# Patient Record
Sex: FEMALE | Race: WHITE | Employment: OTHER | URBAN - NONMETROPOLITAN AREA
[De-identification: names, ages, dates, MRNs, and addresses within clinical notes are randomized per-mention and may not be internally consistent; named-entity substitution may affect disease eponyms.]

---

## 2017-01-05 RX ORDER — MONTELUKAST SODIUM 10 MG/1
TABLET ORAL
Qty: 270 TABLET | Refills: 0 | Status: SHIPPED | OUTPATIENT
Start: 2017-01-05 | End: 2017-09-29

## 2017-01-06 RX ORDER — METOPROLOL SUCCINATE 25 MG/1
TABLET, EXTENDED RELEASE ORAL
Qty: 45 TABLET | Refills: 0 | Status: SHIPPED | OUTPATIENT
Start: 2017-01-06 | End: 2017-04-04

## 2017-01-28 ENCOUNTER — TELEPHONE (OUTPATIENT)
Dept: FAMILY MEDICINE CLINIC | Facility: CLINIC | Age: 62
End: 2017-01-28

## 2017-01-28 RX ORDER — AZITHROMYCIN 250 MG/1
TABLET, FILM COATED ORAL
Qty: 1 PACKAGE | Refills: 0 | OUTPATIENT
Start: 2017-01-28 | End: 2017-02-21 | Stop reason: ALTCHOICE

## 2017-01-28 RX ORDER — ALBUTEROL SULFATE 90 UG/1
2 AEROSOL, METERED RESPIRATORY (INHALATION) EVERY 4 HOURS PRN
Qty: 1 INHALER | Refills: 6 | OUTPATIENT
Start: 2017-01-28 | End: 2017-08-10 | Stop reason: ALTCHOICE

## 2017-01-28 NOTE — TELEPHONE ENCOUNTER
I spoke to the pt. She states on Wed she started with some head pressure and congestion. Yesterday it got worse. She states she has pain in her cheek bones and into her teeth. Nasal congestion which is yellow in color. Pt denies fever.  She is using Advil e

## 2017-02-02 RX ORDER — LISINOPRIL 20 MG/1
TABLET ORAL
Qty: 90 TABLET | Refills: 3 | Status: SHIPPED | OUTPATIENT
Start: 2017-02-02 | End: 2018-01-28

## 2017-02-18 ENCOUNTER — TELEPHONE (OUTPATIENT)
Dept: FAMILY MEDICINE CLINIC | Facility: CLINIC | Age: 62
End: 2017-02-18

## 2017-02-18 RX ORDER — OSELTAMIVIR PHOSPHATE 75 MG/1
CAPSULE ORAL
Qty: 10 CAPSULE | Refills: 0 | Status: SHIPPED | OUTPATIENT
Start: 2017-02-18 | End: 2017-08-10

## 2017-02-18 NOTE — TELEPHONE ENCOUNTER
Patient said that she is nervous that she may come down with flu symptoms, she slept in the same bed as her grandson and he has influenza symptoms. She would like Tamiflu sent to the pharmacy to take prophylactically. Advised to take daily and if symptoms o

## 2017-02-21 ENCOUNTER — LAB ENCOUNTER (OUTPATIENT)
Dept: LAB | Age: 62
End: 2017-02-21
Attending: INTERNAL MEDICINE
Payer: COMMERCIAL

## 2017-02-21 ENCOUNTER — OFFICE VISIT (OUTPATIENT)
Dept: FAMILY MEDICINE CLINIC | Facility: CLINIC | Age: 62
End: 2017-02-21

## 2017-02-21 VITALS
HEART RATE: 92 BPM | SYSTOLIC BLOOD PRESSURE: 160 MMHG | DIASTOLIC BLOOD PRESSURE: 96 MMHG | RESPIRATION RATE: 16 BRPM | TEMPERATURE: 98 F

## 2017-02-21 DIAGNOSIS — I10 ESSENTIAL HYPERTENSION, BENIGN: ICD-10-CM

## 2017-02-21 DIAGNOSIS — E78.00 PURE HYPERCHOLESTEROLEMIA: Primary | ICD-10-CM

## 2017-02-21 DIAGNOSIS — R73.02 GLUCOSE INTOLERANCE (IMPAIRED GLUCOSE TOLERANCE): ICD-10-CM

## 2017-02-21 DIAGNOSIS — E78.00 PURE HYPERCHOLESTEROLEMIA: ICD-10-CM

## 2017-02-21 LAB
ALBUMIN SERPL-MCNC: 3.8 G/DL (ref 3.5–4.8)
ALP LIVER SERPL-CCNC: 98 U/L (ref 46–118)
ALT SERPL-CCNC: 18 U/L (ref 14–54)
AST SERPL-CCNC: 10 U/L (ref 15–41)
BASOPHILS # BLD AUTO: 0.11 X10(3) UL (ref 0–0.1)
BASOPHILS NFR BLD AUTO: 1.5 %
BILIRUB SERPL-MCNC: 0.6 MG/DL (ref 0.1–2)
BUN BLD-MCNC: 17 MG/DL (ref 8–20)
CALCIUM BLD-MCNC: 9.2 MG/DL (ref 8.3–10.3)
CHLORIDE: 102 MMOL/L (ref 101–111)
CHOLEST SMN-MCNC: 221 MG/DL (ref ?–200)
CO2: 30 MMOL/L (ref 22–32)
CREAT BLD-MCNC: 0.82 MG/DL (ref 0.55–1.02)
EOSINOPHIL # BLD AUTO: 0.17 X10(3) UL (ref 0–0.3)
EOSINOPHIL NFR BLD AUTO: 2.2 %
ERYTHROCYTE [DISTWIDTH] IN BLOOD BY AUTOMATED COUNT: 13.7 % (ref 11.5–16)
EST. AVERAGE GLUCOSE BLD GHB EST-MCNC: 120 MG/DL (ref 68–126)
GLUCOSE BLD-MCNC: 111 MG/DL (ref 70–99)
HBA1C MFR BLD HPLC: 5.8 % (ref ?–5.7)
HCT VFR BLD AUTO: 41.6 % (ref 34–50)
HDLC SERPL-MCNC: 46 MG/DL (ref 45–?)
HDLC SERPL: 4.8 {RATIO} (ref ?–4.44)
HGB BLD-MCNC: 13.4 G/DL (ref 12–16)
IMMATURE GRANULOCYTE COUNT: 0.04 X10(3) UL (ref 0–1)
IMMATURE GRANULOCYTE RATIO %: 0.5 %
LDLC SERPL CALC-MCNC: 107 MG/DL (ref ?–130)
LYMPHOCYTES # BLD AUTO: 1.83 X10(3) UL (ref 0.9–4)
LYMPHOCYTES NFR BLD AUTO: 24.2 %
M PROTEIN MFR SERPL ELPH: 7.5 G/DL (ref 6.1–8.3)
MCH RBC QN AUTO: 29.5 PG (ref 27–33.2)
MCHC RBC AUTO-ENTMCNC: 32.2 G/DL (ref 31–37)
MCV RBC AUTO: 91.4 FL (ref 81–100)
MONOCYTES # BLD AUTO: 0.48 X10(3) UL (ref 0.1–0.6)
MONOCYTES NFR BLD AUTO: 6.3 %
NEUTROPHIL ABS PRELIM: 4.94 X10 (3) UL (ref 1.3–6.7)
NEUTROPHILS # BLD AUTO: 4.94 X10(3) UL (ref 1.3–6.7)
NEUTROPHILS NFR BLD AUTO: 65.3 %
NONHDLC SERPL-MCNC: 175 MG/DL (ref ?–130)
PLATELET # BLD AUTO: 148 10(3)UL (ref 150–450)
POTASSIUM SERPL-SCNC: 4.1 MMOL/L (ref 3.6–5.1)
RBC # BLD AUTO: 4.55 X10(6)UL (ref 3.8–5.1)
RED CELL DISTRIBUTION WIDTH-SD: 46.1 FL (ref 35.1–46.3)
SODIUM SERPL-SCNC: 139 MMOL/L (ref 136–144)
TRIGLYCERIDES: 340 MG/DL (ref ?–150)
VLDL: 68 MG/DL (ref 5–40)
WBC # BLD AUTO: 7.6 X10(3) UL (ref 4–13)

## 2017-02-21 PROCEDURE — 36415 COLL VENOUS BLD VENIPUNCTURE: CPT

## 2017-02-21 PROCEDURE — 85025 COMPLETE CBC W/AUTO DIFF WBC: CPT

## 2017-02-21 PROCEDURE — 80061 LIPID PANEL: CPT

## 2017-02-21 PROCEDURE — 99214 OFFICE O/P EST MOD 30 MIN: CPT | Performed by: INTERNAL MEDICINE

## 2017-02-21 PROCEDURE — 80053 COMPREHEN METABOLIC PANEL: CPT

## 2017-02-21 PROCEDURE — 83036 HEMOGLOBIN GLYCOSYLATED A1C: CPT

## 2017-02-21 NOTE — PROGRESS NOTES
Anisa Li is a 63year old female. HPI:   Patient presents for recheck of her hypertension. Pt has been taking medications as instructed, no medication side effects, home BP monitoring in the range of 579-632'F systolic and 95-32'G diastolic. Anxiety. For grand mal seizures Disp:  Rfl:    alprazolam 0.25 MG Oral Tab Take 1 tablet (0.25 mg total) by mouth 3 (three) times daily as needed for Sleep.  Disp: 60 tablet Rfl: 0   Oseltamivir Phosphate 75 MG Oral Cap Take 1 capsule by mouth daily Disp: 1 POLYPECTOMY/SUBMUCOSAL INJECTION/ENDOSCOPIC MUCOSAL RESECTION/BALLOON DILATION/BAND LIGATION;  Surgeon: Gilma Vee DO;  Location: [address] [place] [address] ENDOSCOPY    COLONOSCOPY WITH BIOPSY  7/1/2016    Comment Procedure: COLONOSCOPY WITH BIOPSY;  Surgeon: Prabhjot Dalton

## 2017-02-23 ENCOUNTER — TELEPHONE (OUTPATIENT)
Dept: FAMILY MEDICINE CLINIC | Facility: CLINIC | Age: 62
End: 2017-02-23

## 2017-02-23 DIAGNOSIS — E78.5 ELEVATED LIPIDS: Primary | ICD-10-CM

## 2017-02-23 NOTE — TELEPHONE ENCOUNTER
Patient advised of lab results. She asked if she can start walking on the treadmill and when she needs to recheck lipid panel.

## 2017-02-27 ENCOUNTER — TELEPHONE (OUTPATIENT)
Dept: FAMILY MEDICINE CLINIC | Facility: CLINIC | Age: 62
End: 2017-02-27

## 2017-02-27 RX ORDER — ALPRAZOLAM 0.25 MG/1
0.25 TABLET ORAL 3 TIMES DAILY PRN
Qty: 60 TABLET | Refills: 0 | Status: SHIPPED | OUTPATIENT
Start: 2017-02-27 | End: 2017-12-19

## 2017-02-27 NOTE — TELEPHONE ENCOUNTER
Patient advised, she said this weekend the systolic was in the 41'W. She said she takes both pills in the morning, should she continue this. Can you refill the Xanax?

## 2017-02-27 NOTE — TELEPHONE ENCOUNTER
Patient said blood pressure was 160/110 this am. She is on Lisinopril 20mg and Metoprolol 12.5 mg this blood pressure was before she took her medication. She ate popcorn and took a Xanax last night. She is under stress right now, her parents are both sick.

## 2017-03-03 RX ORDER — METOPROLOL SUCCINATE 25 MG/1
TABLET, EXTENDED RELEASE ORAL
Qty: 45 TABLET | Refills: 0 | Status: SHIPPED | OUTPATIENT
Start: 2017-03-03 | End: 2017-08-10 | Stop reason: ALTCHOICE

## 2017-04-04 RX ORDER — METOPROLOL SUCCINATE 25 MG/1
TABLET, EXTENDED RELEASE ORAL
Qty: 45 TABLET | Refills: 2 | Status: SHIPPED | OUTPATIENT
Start: 2017-04-04 | End: 2017-08-14

## 2017-06-22 ENCOUNTER — PATIENT OUTREACH (OUTPATIENT)
Dept: FAMILY MEDICINE CLINIC | Facility: CLINIC | Age: 62
End: 2017-06-22

## 2017-07-21 ENCOUNTER — TELEPHONE (OUTPATIENT)
Dept: FAMILY MEDICINE CLINIC | Facility: CLINIC | Age: 62
End: 2017-07-21

## 2017-07-21 DIAGNOSIS — Z00.00 ROUTINE HEALTH MAINTENANCE: Primary | ICD-10-CM

## 2017-08-10 ENCOUNTER — LABORATORY ENCOUNTER (OUTPATIENT)
Dept: LAB | Age: 62
End: 2017-08-10
Attending: INTERNAL MEDICINE
Payer: COMMERCIAL

## 2017-08-10 ENCOUNTER — OFFICE VISIT (OUTPATIENT)
Dept: FAMILY MEDICINE CLINIC | Facility: CLINIC | Age: 62
End: 2017-08-10

## 2017-08-10 VITALS — OXYGEN SATURATION: 99 % | TEMPERATURE: 98 F | HEART RATE: 76 BPM

## 2017-08-10 DIAGNOSIS — R76.8 POSITIVE ANA (ANTINUCLEAR ANTIBODY): ICD-10-CM

## 2017-08-10 DIAGNOSIS — I10 ESSENTIAL HYPERTENSION, BENIGN: ICD-10-CM

## 2017-08-10 DIAGNOSIS — D69.1 PLATELET DYSFUNCTION (HCC): ICD-10-CM

## 2017-08-10 DIAGNOSIS — G40.319 SEIZURE DISORDER, GENERALIZED NONCONVULSIVE, INTRACTABLE (HCC): ICD-10-CM

## 2017-08-10 DIAGNOSIS — Z00.00 WELL ADULT EXAM: Primary | ICD-10-CM

## 2017-08-10 DIAGNOSIS — E78.5 ELEVATED LIPIDS: ICD-10-CM

## 2017-08-10 DIAGNOSIS — R73.9 HYPERGLYCEMIA: ICD-10-CM

## 2017-08-10 DIAGNOSIS — R90.82 WHITE MATTER ABNORMALITY ON MRI OF BRAIN: ICD-10-CM

## 2017-08-10 DIAGNOSIS — E66.9 OBESITY, UNSPECIFIED: ICD-10-CM

## 2017-08-10 DIAGNOSIS — D69.1 PLATELET DYSFUNCTION (HCC): Chronic | ICD-10-CM

## 2017-08-10 DIAGNOSIS — D69.6 THROMBOCYTOPENIA (HCC): ICD-10-CM

## 2017-08-10 LAB
ALBUMIN SERPL-MCNC: 3.8 G/DL (ref 3.5–4.8)
ALP LIVER SERPL-CCNC: 89 U/L (ref 46–118)
ALT SERPL-CCNC: 22 U/L (ref 14–54)
AST SERPL-CCNC: 11 U/L (ref 15–41)
BASOPHILS # BLD AUTO: 0.1 X10(3) UL (ref 0–0.1)
BASOPHILS NFR BLD AUTO: 1.4 %
BILIRUB SERPL-MCNC: 0.7 MG/DL (ref 0.1–2)
BUN BLD-MCNC: 14 MG/DL (ref 8–20)
C-REACTIVE PROTEIN: 0.42 MG/DL (ref ?–1)
CALCIUM BLD-MCNC: 8.9 MG/DL (ref 8.3–10.3)
CHLORIDE: 106 MMOL/L (ref 101–111)
CHOLEST SMN-MCNC: 212 MG/DL (ref ?–200)
CO2: 25 MMOL/L (ref 22–32)
CREAT BLD-MCNC: 0.87 MG/DL (ref 0.55–1.02)
EOSINOPHIL # BLD AUTO: 0.13 X10(3) UL (ref 0–0.3)
EOSINOPHIL NFR BLD AUTO: 1.8 %
ERYTHROCYTE [DISTWIDTH] IN BLOOD BY AUTOMATED COUNT: 13.2 % (ref 11.5–16)
EST. AVERAGE GLUCOSE BLD GHB EST-MCNC: 128 MG/DL (ref 68–126)
FREE T4: 0.9 NG/DL (ref 0.9–1.8)
GLUCOSE BLD-MCNC: 122 MG/DL (ref 70–99)
HBA1C MFR BLD HPLC: 6.1 % (ref ?–5.7)
HCT VFR BLD AUTO: 40 % (ref 34–50)
HDLC SERPL-MCNC: 39 MG/DL (ref 45–?)
HDLC SERPL: 5.44 {RATIO} (ref ?–4.44)
HGB BLD-MCNC: 13.4 G/DL (ref 12–16)
IMMATURE GRANULOCYTE COUNT: 0.04 X10(3) UL (ref 0–1)
IMMATURE GRANULOCYTE RATIO %: 0.6 %
LDLC SERPL CALC-MCNC: 114 MG/DL (ref ?–130)
LDLC SERPL-MCNC: 59 MG/DL (ref 5–40)
LYMPHOCYTES # BLD AUTO: 1.24 X10(3) UL (ref 0.9–4)
LYMPHOCYTES NFR BLD AUTO: 17.5 %
M PROTEIN MFR SERPL ELPH: 7.4 G/DL (ref 6.1–8.3)
MCH RBC QN AUTO: 29.3 PG (ref 27–33.2)
MCHC RBC AUTO-ENTMCNC: 33.5 G/DL (ref 31–37)
MCV RBC AUTO: 87.3 FL (ref 81–100)
MONOCYTES # BLD AUTO: 0.43 X10(3) UL (ref 0.1–0.6)
MONOCYTES NFR BLD AUTO: 6.1 %
NEUTROPHIL ABS PRELIM: 5.13 X10 (3) UL (ref 1.3–6.7)
NEUTROPHILS # BLD AUTO: 5.13 X10(3) UL (ref 1.3–6.7)
NEUTROPHILS NFR BLD AUTO: 72.6 %
NONHDLC SERPL-MCNC: 173 MG/DL (ref ?–130)
PLATELET # BLD AUTO: 125 10(3)UL (ref 150–450)
POTASSIUM SERPL-SCNC: 3.9 MMOL/L (ref 3.6–5.1)
RBC # BLD AUTO: 4.58 X10(6)UL (ref 3.8–5.1)
RED CELL DISTRIBUTION WIDTH-SD: 42.3 FL (ref 35.1–46.3)
SED RATE-ML: 11 MM/HR (ref 0–25)
SODIUM SERPL-SCNC: 138 MMOL/L (ref 136–144)
TRIGLYCERIDES: 294 MG/DL (ref ?–150)
TSI SER-ACNC: 5.38 MIU/ML (ref 0.35–5.5)
WBC # BLD AUTO: 7.1 X10(3) UL (ref 4–13)

## 2017-08-10 PROCEDURE — 80061 LIPID PANEL: CPT | Performed by: INTERNAL MEDICINE

## 2017-08-10 PROCEDURE — 99396 PREV VISIT EST AGE 40-64: CPT | Performed by: INTERNAL MEDICINE

## 2017-08-10 PROCEDURE — 84439 ASSAY OF FREE THYROXINE: CPT | Performed by: INTERNAL MEDICINE

## 2017-08-10 PROCEDURE — 36415 COLL VENOUS BLD VENIPUNCTURE: CPT | Performed by: INTERNAL MEDICINE

## 2017-08-10 PROCEDURE — 86038 ANTINUCLEAR ANTIBODIES: CPT | Performed by: INTERNAL MEDICINE

## 2017-08-10 PROCEDURE — 86235 NUCLEAR ANTIGEN ANTIBODY: CPT | Performed by: INTERNAL MEDICINE

## 2017-08-10 PROCEDURE — 85652 RBC SED RATE AUTOMATED: CPT | Performed by: INTERNAL MEDICINE

## 2017-08-10 PROCEDURE — 83036 HEMOGLOBIN GLYCOSYLATED A1C: CPT | Performed by: INTERNAL MEDICINE

## 2017-08-10 PROCEDURE — 80050 GENERAL HEALTH PANEL: CPT | Performed by: INTERNAL MEDICINE

## 2017-08-10 PROCEDURE — 86140 C-REACTIVE PROTEIN: CPT | Performed by: INTERNAL MEDICINE

## 2017-08-10 NOTE — PROGRESS NOTES
HPI:   Yumiko Guzmán is a 63year old female who presents for a complete physical exam. Symptoms: is menopausal. Patient complains of weight gain. She has been doing well.   She has follow up with neuro and rheum due to issues with encephalopathy and s TABLET BY MOUTH EVERY DAY Disp: 270 tablet Rfl: 0   levetiracetam 750 MG Oral Tab Take 1 tablet (750 mg total) by mouth 2 (two) times daily. Disp:  Rfl: 0   Probiotic Product (PROBIOTIC DAILY) Oral Cap Take by mouth.  Florstar 1 capsule BID Disp:  Rfl:    C ENDOSCOPY   Family History   Problem Relation Age of Onset   • Heart Disorder Father    • Other [OTHER] Father      PACEMAKER TACHYBRADY SYNDROME   • Arthritis Mother    • Heart Disorder Mother    • Other Cristofer Lam Mother      Hypothyroidism   • Cancer Mater RECTAL:good rectal tone, stool is OB negative  MUSCULOSKELETAL: back is not tender,FROM of the back  EXTREMITIES: no cyanosis, clubbing or edema  NEURO: Oriented times three,cranial nerves are intact,motor and sensory are grossly intact    ASSESSMENT AND

## 2017-08-11 LAB — ANA SCREEN: POSITIVE

## 2017-08-13 LAB
RIBONUCLEIC PROTEIN (U1) (ENA) AB, IGG: 0 AU/ML
SMITH (ENA) ANTIBODY, IGG: 0 AU/ML
SSA 52 (RO) (ENA) ANTIBODY, IGG: 1 AU/ML
SSA 60 (RO) (ENA) ANTIBODY, IGG: 0 AU/ML
SSB (LA) (ENA) ANTIBODY, IGG: 0 AU/ML

## 2017-08-14 ENCOUNTER — TELEPHONE (OUTPATIENT)
Dept: FAMILY MEDICINE CLINIC | Facility: CLINIC | Age: 62
End: 2017-08-14

## 2017-08-14 RX ORDER — METOPROLOL SUCCINATE 25 MG/1
25 TABLET, EXTENDED RELEASE ORAL DAILY
Qty: 90 TABLET | Refills: 2 | COMMUNITY
Start: 2017-08-14 | End: 2017-09-20

## 2017-08-14 NOTE — TELEPHONE ENCOUNTER
Calling the patient to advise- correcting the med list as well       Future Appointments  Date Time Provider Richard Leary   8/29/2017 2:00 PM [facility] NURSE HALIMA [facility]

## 2017-08-14 NOTE — TELEPHONE ENCOUNTER
BP at home are better, increase metoprolol to 25 mg and continue lisinopril at 20 mg recheck here with home cuff in 2 weeks.

## 2017-08-14 NOTE — TELEPHONE ENCOUNTER
I had called the patient about her labs and she was questioning her BP readings from when she was in last 8/10/17    She advised that slade took 2x- advised she was not going to record it and have Dr Aga Armstrong recheck it; Dr Aga Armstrong rechecked it and then also

## 2017-08-15 ENCOUNTER — TELEPHONE (OUTPATIENT)
Dept: FAMILY MEDICINE CLINIC | Facility: CLINIC | Age: 62
End: 2017-08-15

## 2017-08-15 RX ORDER — METOPROLOL SUCCINATE 25 MG/1
25 TABLET, EXTENDED RELEASE ORAL DAILY
Qty: 90 TABLET | Refills: 3 | Status: SHIPPED | OUTPATIENT
Start: 2017-08-15 | End: 2018-04-24

## 2017-08-15 RX ORDER — SACCHAROMYCES BOULARDII 250 MG
250 CAPSULE ORAL 2 TIMES DAILY
Qty: 60 CAPSULE | Refills: 11 | Status: SHIPPED | OUTPATIENT
Start: 2017-08-15

## 2017-08-15 RX ORDER — METOPROLOL SUCCINATE 25 MG/1
25 TABLET, EXTENDED RELEASE ORAL DAILY
Qty: 90 TABLET | Refills: 2 | Status: CANCELLED | OUTPATIENT
Start: 2017-08-15

## 2017-08-15 NOTE — TELEPHONE ENCOUNTER
Call Sarita about Florastor, probiotic Quangjo ann Salinas wants to know if this can be run thru her ins because its $50.00 for 50 pills. She needs Metoprolol to be filled for 25mg for 30 days needs new script to [facility], call Jaylon Alas.

## 2017-08-29 ENCOUNTER — NURSE ONLY (OUTPATIENT)
Dept: FAMILY MEDICINE CLINIC | Facility: CLINIC | Age: 62
End: 2017-08-29

## 2017-08-29 VITALS — SYSTOLIC BLOOD PRESSURE: 132 MMHG | DIASTOLIC BLOOD PRESSURE: 80 MMHG

## 2017-09-12 ENCOUNTER — TELEPHONE (OUTPATIENT)
Dept: FAMILY MEDICINE CLINIC | Facility: CLINIC | Age: 62
End: 2017-09-12

## 2017-09-12 NOTE — TELEPHONE ENCOUNTER
Patient said that she thinks she has a sinus infection, she has laryngitis, congestion, sinus pressure and she feels \"terrible\". She is on Singulair, Tylenol, and she took a Zyrtec last night.  She is afraid it is going to go in her chest. Does she need t

## 2017-09-16 ENCOUNTER — TELEPHONE (OUTPATIENT)
Dept: FAMILY MEDICINE CLINIC | Facility: CLINIC | Age: 62
End: 2017-09-16

## 2017-09-16 RX ORDER — CIPROFLOXACIN 500 MG/1
500 TABLET, FILM COATED ORAL 2 TIMES DAILY
Qty: 20 TABLET | Refills: 0 | Status: SHIPPED | OUTPATIENT
Start: 2017-09-16 | End: 2017-09-20 | Stop reason: ALTCHOICE

## 2017-09-16 NOTE — TELEPHONE ENCOUNTER
He called the other day and was asking for antibiotics to treat a sinus infection. Patient said she is feeling slightly better but she has a bad cough and she found an old Albuterol inhaler she has been using every 4 hours.  She said that sides of her back

## 2017-09-20 ENCOUNTER — OFFICE VISIT (OUTPATIENT)
Dept: FAMILY MEDICINE CLINIC | Facility: CLINIC | Age: 62
End: 2017-09-20

## 2017-09-20 ENCOUNTER — TELEPHONE (OUTPATIENT)
Dept: FAMILY MEDICINE CLINIC | Facility: CLINIC | Age: 62
End: 2017-09-20

## 2017-09-20 VITALS
TEMPERATURE: 98 F | HEART RATE: 90 BPM | RESPIRATION RATE: 18 BRPM | SYSTOLIC BLOOD PRESSURE: 130 MMHG | DIASTOLIC BLOOD PRESSURE: 70 MMHG

## 2017-09-20 DIAGNOSIS — I10 ESSENTIAL HYPERTENSION, BENIGN: ICD-10-CM

## 2017-09-20 DIAGNOSIS — R05.9 COUGH: Primary | ICD-10-CM

## 2017-09-20 PROCEDURE — 99214 OFFICE O/P EST MOD 30 MIN: CPT | Performed by: INTERNAL MEDICINE

## 2017-09-20 RX ORDER — ALBUTEROL SULFATE 90 UG/1
2 AEROSOL, METERED RESPIRATORY (INHALATION) EVERY 6 HOURS PRN
Qty: 1 INHALER | Refills: 2 | Status: SHIPPED | OUTPATIENT
Start: 2017-09-20 | End: 2017-10-12

## 2017-09-20 RX ORDER — PREDNISONE 20 MG/1
20 TABLET ORAL DAILY
Qty: 5 TABLET | Refills: 0 | Status: SHIPPED | OUTPATIENT
Start: 2017-09-20 | End: 2018-04-24 | Stop reason: ALTCHOICE

## 2017-09-20 RX ORDER — ALBUTEROL SULFATE 90 UG/1
AEROSOL, METERED RESPIRATORY (INHALATION) EVERY 6 HOURS PRN
COMMUNITY
End: 2017-09-20

## 2017-09-20 NOTE — PROGRESS NOTES
HPI:   Yumiko Guzmán is a 63year old female who presents for upper respiratory symptoms for  5  days. Patient reports congestion, dry cough, cough is keeping pt up at night, wheezing.       Current Outpatient Prescriptions:  Albuterol Sulfate HFA (PROA 12/27/2011   • Postmenopausal atrophic vaginitis 6/27/2007   • Pure hypercholesterolemia 10/10/2007   • Seizure disorder [facility])     last episode Sept 14, 2015   • Unspecified asthma(493.90) 3/23/2007   • Unspecified essential hypertension    • Unspecified h (36.9 °C) (Temporal)   Resp 18   LMP  (LMP Unknown)   GENERAL: well developed, well nourished,in no apparent distress  SKIN: no rashes,no suspicious lesions  EYES:PERRLA, EOMI, normal optic disk,conjunctiva are clear  HEENT: atraumatic, normocephalic,ears

## 2017-09-20 NOTE — TELEPHONE ENCOUNTER
WHEEZING WORSE THIS WEEK. I DID MAKE AN APPOINTMENT FOR TOMORROW BUT SHE WAS WONDERING IF SHE COULD GET SQUEEZED IN TODAY? PLEASE LET HER KNOW.

## 2017-09-29 RX ORDER — MONTELUKAST SODIUM 10 MG/1
TABLET ORAL
Qty: 270 TABLET | Refills: 0 | Status: SHIPPED | OUTPATIENT
Start: 2017-09-29 | End: 2018-05-10

## 2017-10-06 ENCOUNTER — TELEPHONE (OUTPATIENT)
Dept: FAMILY MEDICINE CLINIC | Facility: CLINIC | Age: 62
End: 2017-10-06

## 2017-10-06 NOTE — TELEPHONE ENCOUNTER
Asked to speak with Dr Candie Ortega. Is asking if Dr Candie Ortega would call in a steroid inhaler. Says she hasn't been feeling well since the fair and said that with added anxiety she feels she need something more.

## 2017-10-12 ENCOUNTER — MED REC SCAN ONLY (OUTPATIENT)
Dept: FAMILY MEDICINE CLINIC | Facility: CLINIC | Age: 62
End: 2017-10-12

## 2017-10-12 RX ORDER — ALBUTEROL SULFATE 90 UG/1
2 AEROSOL, METERED RESPIRATORY (INHALATION) EVERY 6 HOURS PRN
Qty: 1 INHALER | Refills: 2 | Status: SHIPPED | OUTPATIENT
Start: 2017-10-12 | End: 2020-04-03

## 2017-10-12 NOTE — TELEPHONE ENCOUNTER
ACT done. Patient wanted Dr Toi Larsen to know she has an MRI next Monday. She said the Sigulair and Zyrtec help but Dr Kathya Amin prescribes a steroid inhaler for her last week. She wants refill on Proair. Proair refilled, Dr Toi Larsen advised of the results.

## 2017-11-11 ENCOUNTER — TELEPHONE (OUTPATIENT)
Dept: FAMILY MEDICINE CLINIC | Facility: CLINIC | Age: 62
End: 2017-11-11

## 2017-11-11 NOTE — TELEPHONE ENCOUNTER
FEELS LIKE SHE HAS A COLD SORE COMING ON IN LT CORNER SIDE OF MOUTH, CAN SOMETHING BE SENT TO [facility] SO IT DOESN'T GET FULL BLOWN?

## 2017-11-11 NOTE — TELEPHONE ENCOUNTER
Patient said it has been a long time since she has had a cold sore, it has just started. She said that she has not tried anything, she did not know what to.  FYI patient said that Dr Lizett Brown wants her tested for the Storage Pool Disease since her daughter is p

## 2017-11-22 ENCOUNTER — TELEPHONE (OUTPATIENT)
Dept: FAMILY MEDICINE CLINIC | Facility: CLINIC | Age: 62
End: 2017-11-22

## 2017-11-22 DIAGNOSIS — E78.00 PURE HYPERCHOLESTEROLEMIA: ICD-10-CM

## 2017-11-22 DIAGNOSIS — E78.00 HYPERCHOLESTEREMIA: Primary | ICD-10-CM

## 2017-11-22 NOTE — TELEPHONE ENCOUNTER
Patient said that she had blood work done Monday at [facility] ordered by Dr Hellen Hansen to be checked for the Storage Pool Disease (PFA with EPIADP) she also had a CBC and CMP. She said it was abnormal and so they are going to do a Platelet Electron Microscopy.  She is

## 2017-11-30 ENCOUNTER — MED REC SCAN ONLY (OUTPATIENT)
Dept: FAMILY MEDICINE CLINIC | Facility: CLINIC | Age: 62
End: 2017-11-30

## 2017-12-07 ENCOUNTER — TELEPHONE (OUTPATIENT)
Dept: FAMILY MEDICINE CLINIC | Facility: CLINIC | Age: 62
End: 2017-12-07

## 2017-12-07 NOTE — TELEPHONE ENCOUNTER
DOES HAVE STORAGE POOL DEFICIENCY, WILL SEE SPECIALIST NEXT Friday 12/15/17. AND WILL HAVE TO CARRY A NASAL SPRAY FOR BLEEDING. SHE WILL HAVE ALL TESTING SENT HERE.

## 2017-12-07 NOTE — TELEPHONE ENCOUNTER
FYKENNETH, Patient said that she is seeing Dr Jenelle Beach on the 15th and she will have a platelet transfusion and will have to carry DDAVP around with her.

## 2017-12-19 RX ORDER — ALPRAZOLAM 0.25 MG/1
0.25 TABLET ORAL 3 TIMES DAILY PRN
Qty: 60 TABLET | Refills: 0 | Status: SHIPPED | OUTPATIENT
Start: 2017-12-19 | End: 2018-11-26

## 2017-12-19 NOTE — TELEPHONE ENCOUNTER
Patient said that she has some left but has been going through a lot of stress right now.  Last refill 2/27/17

## 2018-01-15 ENCOUNTER — MED REC SCAN ONLY (OUTPATIENT)
Dept: FAMILY MEDICINE CLINIC | Facility: CLINIC | Age: 63
End: 2018-01-15

## 2018-01-22 ENCOUNTER — TELEPHONE (OUTPATIENT)
Dept: FAMILY MEDICINE CLINIC | Facility: CLINIC | Age: 63
End: 2018-01-22

## 2018-01-22 NOTE — TELEPHONE ENCOUNTER
Patient asked if it is possible to have a sinus infection \"for months\"? She said she has had \"green snot\" for months. She said she has been using a humidifier for the last 3 weeks and it had helped slightly.  She said she has constant congestion and occ

## 2018-01-29 RX ORDER — LISINOPRIL 20 MG/1
TABLET ORAL
Qty: 90 TABLET | Refills: 0 | Status: SHIPPED | OUTPATIENT
Start: 2018-01-29 | End: 2018-02-08

## 2018-01-29 NOTE — TELEPHONE ENCOUNTER
Last office visit: 11/13/2017  Last CMP: 8/10/2017  Last B/P taken 9/20/2017: 130/70      Last refill: 02/02/2017    Pending Prescriptions Disp Refills    LISINOPRIL 20 MG Oral Tab [Pharmacy Med Name: LISINOPRIL 20MG TABLETS] 90 tablet 0     Sig: TAKE 1 TABLET BY MOUTH EVERY DAY         No future appointments.

## 2018-02-08 RX ORDER — LISINOPRIL 20 MG/1
TABLET ORAL
Qty: 90 TABLET | Refills: 0 | Status: SHIPPED | OUTPATIENT
Start: 2018-02-08 | End: 2018-08-02

## 2018-02-08 NOTE — TELEPHONE ENCOUNTER
Last office visit: 11/13/2017  Last CMP and Lipid: 8/10/2017  Last B/P taken 9/20/2017:  130/70    Last refill: 1/29/2018    Pending Prescriptions Disp Refills    LISINOPRIL 20 MG Oral Tab [Pharmacy Med Name: LISINOPRIL 20MG TABLETS] 90 tablet 0     Sig: TAKE 1 TABLET BY MOUTH EVERY DAY         No future appointments.

## 2018-04-23 ENCOUNTER — TELEPHONE (OUTPATIENT)
Dept: FAMILY MEDICINE CLINIC | Facility: CLINIC | Age: 63
End: 2018-04-23

## 2018-04-23 NOTE — TELEPHONE ENCOUNTER
Patient said that she had a really bad headache yesterday and she took her blood pressure and it was 190/100. She said she took a Xanax because she has a lot of stress going on right now and it went down to 150/100 this morning.  She said she made an appoin

## 2018-04-24 ENCOUNTER — LAB ENCOUNTER (OUTPATIENT)
Dept: LAB | Age: 63
End: 2018-04-24
Attending: INTERNAL MEDICINE
Payer: COMMERCIAL

## 2018-04-24 ENCOUNTER — OFFICE VISIT (OUTPATIENT)
Dept: FAMILY MEDICINE CLINIC | Facility: CLINIC | Age: 63
End: 2018-04-24

## 2018-04-24 VITALS
HEIGHT: 62 IN | DIASTOLIC BLOOD PRESSURE: 100 MMHG | WEIGHT: 245 LBS | BODY MASS INDEX: 45.08 KG/M2 | SYSTOLIC BLOOD PRESSURE: 160 MMHG | OXYGEN SATURATION: 98 % | HEART RATE: 80 BPM | TEMPERATURE: 97 F

## 2018-04-24 DIAGNOSIS — I10 ESSENTIAL HYPERTENSION: ICD-10-CM

## 2018-04-24 DIAGNOSIS — I10 ESSENTIAL HYPERTENSION: Primary | ICD-10-CM

## 2018-04-24 DIAGNOSIS — E78.00 HYPERCHOLESTEREMIA: ICD-10-CM

## 2018-04-24 DIAGNOSIS — R73.09 ELEVATED GLUCOSE: ICD-10-CM

## 2018-04-24 PROCEDURE — 90472 IMMUNIZATION ADMIN EACH ADD: CPT | Performed by: INTERNAL MEDICINE

## 2018-04-24 PROCEDURE — 90636 HEP A/HEP B VACC ADULT IM: CPT | Performed by: INTERNAL MEDICINE

## 2018-04-24 PROCEDURE — 80053 COMPREHEN METABOLIC PANEL: CPT | Performed by: INTERNAL MEDICINE

## 2018-04-24 PROCEDURE — 90471 IMMUNIZATION ADMIN: CPT | Performed by: INTERNAL MEDICINE

## 2018-04-24 PROCEDURE — 83036 HEMOGLOBIN GLYCOSYLATED A1C: CPT | Performed by: INTERNAL MEDICINE

## 2018-04-24 PROCEDURE — 90715 TDAP VACCINE 7 YRS/> IM: CPT | Performed by: INTERNAL MEDICINE

## 2018-04-24 PROCEDURE — 99214 OFFICE O/P EST MOD 30 MIN: CPT | Performed by: INTERNAL MEDICINE

## 2018-04-24 PROCEDURE — 36415 COLL VENOUS BLD VENIPUNCTURE: CPT | Performed by: INTERNAL MEDICINE

## 2018-04-24 PROCEDURE — 85025 COMPLETE CBC W/AUTO DIFF WBC: CPT | Performed by: INTERNAL MEDICINE

## 2018-04-24 PROCEDURE — 80061 LIPID PANEL: CPT | Performed by: INTERNAL MEDICINE

## 2018-04-24 RX ORDER — LISINOPRIL 40 MG/1
40 TABLET ORAL DAILY
Qty: 90 TABLET | Refills: 3 | Status: SHIPPED | OUTPATIENT
Start: 2018-04-24 | End: 2018-06-04

## 2018-04-24 RX ORDER — METOPROLOL SUCCINATE 50 MG/1
50 TABLET, EXTENDED RELEASE ORAL DAILY
Qty: 90 TABLET | Refills: 0 | Status: SHIPPED | OUTPATIENT
Start: 2018-04-24 | End: 2018-08-01

## 2018-04-24 NOTE — PROGRESS NOTES
Isaias Bee is a 63year old female. HPI:   Patient presents for recheck of her hypertension. Pt has been taking medications as instructed, no medication side effects, home BP monitoring in the range of 634-997'M systolic and 'N diastolic. MG Oral Tab TAKE 1 TABLET BY MOUTH EVERY DAY Disp: 270 tablet Rfl: 0   saccharomyces boulardii (FLORASTOR) 250 MG Oral Cap Take 1 capsule (250 mg total) by mouth 2 (two) times daily.  Disp: 60 capsule Rfl: 11   levetiracetam 750 MG Oral Tab Take 1 tablet (7 Surgeon: Rusty Dyer DO;                 Location: [address] [place] [address]   Social History:    Smoking status: Never Smoker                                                              Smokeless tobacco: Never Used                      Comment: Non-smoker  Alc

## 2018-04-25 DIAGNOSIS — D69.6 THROMBOCYTOPENIA (HCC): ICD-10-CM

## 2018-04-25 DIAGNOSIS — I10 ESSENTIAL HYPERTENSION, BENIGN: ICD-10-CM

## 2018-04-25 DIAGNOSIS — G40.319 SEIZURE DISORDER, GENERALIZED NONCONVULSIVE, INTRACTABLE (HCC): ICD-10-CM

## 2018-04-25 DIAGNOSIS — E11.8 TYPE 2 DIABETES MELLITUS WITH COMPLICATION, WITHOUT LONG-TERM CURRENT USE OF INSULIN (HCC): ICD-10-CM

## 2018-04-25 DIAGNOSIS — D84.9 IMMUNE DEFICIENCY DISORDER (HCC): Primary | ICD-10-CM

## 2018-05-10 ENCOUNTER — TELEPHONE (OUTPATIENT)
Dept: FAMILY MEDICINE CLINIC | Facility: CLINIC | Age: 63
End: 2018-05-10

## 2018-05-10 RX ORDER — LISINOPRIL 30 MG/1
30 TABLET ORAL DAILY
Qty: 30 TABLET | Refills: 0 | Status: CANCELLED | OUTPATIENT
Start: 2018-05-10

## 2018-05-10 RX ORDER — LISINOPRIL 30 MG/1
30 TABLET ORAL DAILY
Qty: 90 TABLET | Refills: 0 | Status: SHIPPED | OUTPATIENT
Start: 2018-05-10 | End: 2018-08-01

## 2018-05-10 RX ORDER — MONTELUKAST SODIUM 10 MG/1
10 TABLET ORAL
Qty: 270 TABLET | Refills: 0 | Status: SHIPPED | OUTPATIENT
Start: 2018-05-10 | End: 2019-02-15

## 2018-05-10 NOTE — TELEPHONE ENCOUNTER
Patient said that she was nervous to take Lisinopril 40mg because she is worried about her kidneys. She has been taking 30mg (20mg and 10mg of her 's) for the last 2 weeks and taking Metoprolol 50mg at night.  She said that her blood pressure has bee

## 2018-05-10 NOTE — TELEPHONE ENCOUNTER
If only comes in 21 and 40, I think she should take the 40 mg, it protects the kidney, you don't have to worry.

## 2018-06-04 ENCOUNTER — OFFICE VISIT (OUTPATIENT)
Dept: FAMILY MEDICINE CLINIC | Facility: CLINIC | Age: 63
End: 2018-06-04

## 2018-06-04 VITALS
TEMPERATURE: 98 F | SYSTOLIC BLOOD PRESSURE: 146 MMHG | WEIGHT: 236 LBS | BODY MASS INDEX: 43 KG/M2 | DIASTOLIC BLOOD PRESSURE: 80 MMHG

## 2018-06-04 DIAGNOSIS — I10 ESSENTIAL HYPERTENSION, BENIGN: Primary | ICD-10-CM

## 2018-06-04 PROCEDURE — 99214 OFFICE O/P EST MOD 30 MIN: CPT | Performed by: INTERNAL MEDICINE

## 2018-06-04 RX ORDER — DESMOPRESSIN ACETATE 150/SPRAY
AEROSOL, SPRAY WITH PUMP (EA) NASAL
COMMUNITY
Start: 2017-12-15 | End: 2020-08-11

## 2018-06-04 NOTE — PROGRESS NOTES
Malu Garcia is a 63year old female. HPI:   Patient presents for recheck of her hypertension. Pt has been taking medications as instructed, no medication side effects, home BP monitoring in the range of 342-420'Q systolic and 04-63'G diastolic. Soln Inhale 2 puffs into the lungs every 6 (six) hours as needed for Wheezing. Disp: 1 Inhaler Rfl: 2   saccharomyces boulardii (FLORASTOR) 250 MG Oral Cap Take 1 capsule (250 mg total) by mouth 2 (two) times daily.  Disp: 60 capsule Rfl: 11   levetiracetam WITH                BIOPSY;  Surgeon: Kaylee Babin DO;                 Location: [facility] ENDOSCOPY   Social History:    Smoking status: Never Smoker                                                              Smokeless tobacco: Never Used

## 2018-08-02 RX ORDER — LISINOPRIL 30 MG/1
TABLET ORAL
Qty: 90 TABLET | Refills: 0 | Status: SHIPPED | OUTPATIENT
Start: 2018-08-02 | End: 2018-10-27

## 2018-08-02 RX ORDER — METOPROLOL SUCCINATE 50 MG/1
TABLET, EXTENDED RELEASE ORAL
Qty: 90 TABLET | Refills: 0 | Status: SHIPPED | OUTPATIENT
Start: 2018-08-02 | End: 2018-10-27

## 2018-08-02 NOTE — TELEPHONE ENCOUNTER
Last OV 6/4/18  B/P 146/80 no pulse check  Last filled lisinopril #90/0rf 5/10/18  Metoprolol #90/0rf 4/24/18

## 2018-10-29 RX ORDER — LISINOPRIL 30 MG/1
TABLET ORAL
Qty: 90 TABLET | Refills: 0 | Status: SHIPPED | OUTPATIENT
Start: 2018-10-29 | End: 2019-01-21

## 2018-10-29 RX ORDER — METOPROLOL SUCCINATE 50 MG/1
TABLET, EXTENDED RELEASE ORAL
Qty: 90 TABLET | Refills: 0 | Status: SHIPPED | OUTPATIENT
Start: 2018-10-29 | End: 2019-01-22

## 2018-11-15 ENCOUNTER — TELEPHONE (OUTPATIENT)
Dept: FAMILY MEDICINE CLINIC | Facility: CLINIC | Age: 63
End: 2018-11-15

## 2018-11-15 RX ORDER — CIPROFLOXACIN 500 MG/1
500 TABLET, FILM COATED ORAL 2 TIMES DAILY
Qty: 10 TABLET | Refills: 0 | Status: SHIPPED | OUTPATIENT
Start: 2018-11-15 | End: 2018-11-20

## 2018-11-15 NOTE — TELEPHONE ENCOUNTER
Urinary frequency, on her way home from [place] and has to urinate every 15 minutes. Will not make it home before we close and pcp is not in tomorrow.     Asking for script to alis in [place]

## 2018-11-19 ENCOUNTER — TELEPHONE (OUTPATIENT)
Dept: FAMILY MEDICINE CLINIC | Facility: CLINIC | Age: 63
End: 2018-11-19

## 2018-11-19 ENCOUNTER — NURSE ONLY (OUTPATIENT)
Dept: FAMILY MEDICINE CLINIC | Facility: CLINIC | Age: 63
End: 2018-11-19
Payer: COMMERCIAL

## 2018-11-19 DIAGNOSIS — N39.0 URINARY TRACT INFECTION WITH HEMATURIA, SITE UNSPECIFIED: ICD-10-CM

## 2018-11-19 DIAGNOSIS — R31.9 URINARY TRACT INFECTION WITH HEMATURIA, SITE UNSPECIFIED: ICD-10-CM

## 2018-11-19 DIAGNOSIS — I10 ESSENTIAL HYPERTENSION, BENIGN: Primary | ICD-10-CM

## 2018-11-19 DIAGNOSIS — E66.09 CLASS 2 OBESITY DUE TO EXCESS CALORIES WITHOUT SERIOUS COMORBIDITY WITH BODY MASS INDEX (BMI) OF 36.0 TO 36.9 IN ADULT: ICD-10-CM

## 2018-11-19 DIAGNOSIS — R35.0 URINARY FREQUENCY: ICD-10-CM

## 2018-11-19 DIAGNOSIS — I10 ESSENTIAL HYPERTENSION, BENIGN: ICD-10-CM

## 2018-11-19 DIAGNOSIS — E78.00 PURE HYPERCHOLESTEROLEMIA: Primary | ICD-10-CM

## 2018-11-19 PROCEDURE — 85025 COMPLETE CBC W/AUTO DIFF WBC: CPT | Performed by: INTERNAL MEDICINE

## 2018-11-19 PROCEDURE — 80053 COMPREHEN METABOLIC PANEL: CPT | Performed by: INTERNAL MEDICINE

## 2018-11-19 PROCEDURE — 87086 URINE CULTURE/COLONY COUNT: CPT | Performed by: INTERNAL MEDICINE

## 2018-11-19 PROCEDURE — 83036 HEMOGLOBIN GLYCOSYLATED A1C: CPT | Performed by: INTERNAL MEDICINE

## 2018-11-19 PROCEDURE — 36415 COLL VENOUS BLD VENIPUNCTURE: CPT | Performed by: INTERNAL MEDICINE

## 2018-11-19 PROCEDURE — 80061 LIPID PANEL: CPT | Performed by: INTERNAL MEDICINE

## 2018-11-19 PROCEDURE — 81003 URINALYSIS AUTO W/O SCOPE: CPT | Performed by: INTERNAL MEDICINE

## 2018-11-19 NOTE — TELEPHONE ENCOUNTER
Patient said today is the last day of taking her antibiotic, she has also been taking Azo. She said that she is still urinating very frequently. She is wondering if she should have her urine tested. She did not take he antibiotic today or the Azo.  She said

## 2018-11-19 NOTE — TELEPHONE ENCOUNTER
ROBERT for patient to Cb, urine was sent for culture, she should take the last antibiotic and can start Pyridium.  V.O. Dr Skip Mccarthy RN

## 2018-11-19 NOTE — TELEPHONE ENCOUNTER
LM for patient to CB. Urine culture was ordered, Pyridium was ordered. She can take the last antibiotic but Dr Winston Reich will wait until culture comes back to see if she needs a different prescription.

## 2018-11-21 ENCOUNTER — TELEPHONE (OUTPATIENT)
Dept: FAMILY MEDICINE CLINIC | Facility: CLINIC | Age: 63
End: 2018-11-21

## 2018-11-21 RX ORDER — EZETIMIBE 10 MG/1
10 TABLET ORAL DAILY
Qty: 90 TABLET | Refills: 3 | Status: SHIPPED | OUTPATIENT
Start: 2018-11-21 | End: 2019-11-16

## 2018-11-21 RX ORDER — SIMVASTATIN 20 MG
20 TABLET ORAL NIGHTLY
Qty: 90 TABLET | Refills: 3 | Status: SHIPPED | OUTPATIENT
Start: 2018-11-21 | End: 2019-02-19

## 2018-11-27 RX ORDER — ALPRAZOLAM 0.25 MG/1
TABLET ORAL
Qty: 60 TABLET | Refills: 0 | Status: SHIPPED
Start: 2018-11-27 | End: 2020-03-16

## 2019-01-21 RX ORDER — LISINOPRIL 30 MG/1
TABLET ORAL
Qty: 90 TABLET | Refills: 0 | Status: SHIPPED | OUTPATIENT
Start: 2019-01-21 | End: 2019-04-28

## 2019-01-22 RX ORDER — METOPROLOL SUCCINATE 50 MG/1
TABLET, EXTENDED RELEASE ORAL
Qty: 90 TABLET | Refills: 0 | Status: SHIPPED | OUTPATIENT
Start: 2019-01-22 | End: 2019-04-18

## 2019-02-14 ENCOUNTER — TELEPHONE (OUTPATIENT)
Dept: FAMILY MEDICINE CLINIC | Facility: CLINIC | Age: 64
End: 2019-02-14

## 2019-02-14 RX ORDER — CIPROFLOXACIN 500 MG/1
500 TABLET, FILM COATED ORAL 2 TIMES DAILY
Qty: 20 TABLET | Refills: 0 | Status: SHIPPED | OUTPATIENT
Start: 2019-02-14 | End: 2019-02-24

## 2019-02-14 NOTE — TELEPHONE ENCOUNTER
Patient said that she went off her Singulair 3 months ago because the  changed and it made her mouth very dry. She said she has been feeling sick since Tuesday with teeth pain, swollen throat and nasal congestion. She is using a humidifier.  Not

## 2019-02-14 NOTE — TELEPHONE ENCOUNTER
SHE HAS A SINUS INFECTION AND IS HOPING DR HOOKS WILL CALL SOMETHING IN FOR HER INSTEAD OF COMING IN

## 2019-02-15 ENCOUNTER — OFFICE VISIT (OUTPATIENT)
Dept: FAMILY MEDICINE CLINIC | Facility: CLINIC | Age: 64
End: 2019-02-15
Payer: COMMERCIAL

## 2019-02-15 VITALS
SYSTOLIC BLOOD PRESSURE: 138 MMHG | HEIGHT: 62 IN | BODY MASS INDEX: 43.24 KG/M2 | TEMPERATURE: 98 F | DIASTOLIC BLOOD PRESSURE: 82 MMHG | WEIGHT: 235 LBS

## 2019-02-15 DIAGNOSIS — J05.0 CROUP: Primary | ICD-10-CM

## 2019-02-15 PROCEDURE — 99213 OFFICE O/P EST LOW 20 MIN: CPT | Performed by: FAMILY MEDICINE

## 2019-02-15 RX ORDER — PREDNISONE 20 MG/1
20 TABLET ORAL 2 TIMES DAILY
Qty: 10 TABLET | Refills: 0 | Status: SHIPPED | OUTPATIENT
Start: 2019-02-15 | End: 2019-02-20

## 2019-02-15 RX ORDER — ALBUTEROL SULFATE 90 UG/1
2 AEROSOL, METERED RESPIRATORY (INHALATION) EVERY 4 HOURS PRN
Qty: 1 INHALER | Refills: 6 | Status: SHIPPED | OUTPATIENT
Start: 2019-02-15

## 2019-02-15 NOTE — PATIENT INSTRUCTIONS
Croup    Your toddler has a harsh cough that gets worse in the evening. Now she’s woken up gasping for air. Chances are she has croup. This is an infection of the voice box (larynx) and windpipe (trachea).  Croup causes the airways to swell, making it anne · Keep your child's head raised. Prop an older child up in bed with extra pillows. Never use pillows with an infant younger than 13 months old. · Sleep in the same room as your child while he or she is sick.  You will be able to help your child right away

## 2019-02-15 NOTE — PROGRESS NOTES
HPI:   Harish Sandhu is a 66year old female who presents for upper respiratory symptoms for  5  days. Throat felt like a tree trunk. Patient reports congestion cipro was called in. Felt wired after taking it. Has nasal yellow congestion. used essenti sclerosis) ([facility]     diagnosed 30 years ago   • Obesity, unspecified 5/27/2008   • Osteoarthrosis, unspecified whether generalized or localized, unspecified site 12/27/2011   • Other and unspecified hyperlipidemia    • Pain in joint, lower leg 12/27/2011 nausea or abdominal pain  NEURO: denies headaches    EXAM:   /82 (BP Location: Left arm, Patient Position: Sitting, Cuff Size: large)   Temp 98.1 °F (36.7 °C) (Temporal)   Ht 62\"   Wt 235 lb   LMP  (LMP Unknown)   BMI 42.98 kg/m²   GENERAL: well dev

## 2019-02-18 ENCOUNTER — TELEPHONE (OUTPATIENT)
Dept: FAMILY MEDICINE CLINIC | Facility: CLINIC | Age: 64
End: 2019-02-18

## 2019-02-18 NOTE — TELEPHONE ENCOUNTER
FYI Patient said she woke up Friday and was \"10x worse\". She saw Dr Ondina Harris, and was put on Prednisone 40mg for croup. Dr Ondina Harris told her to stop the antibiotics, because her HR was elevated and throat was swollen. She had only taken one pill.  She said th

## 2019-02-19 ENCOUNTER — OFFICE VISIT (OUTPATIENT)
Dept: FAMILY MEDICINE CLINIC | Facility: CLINIC | Age: 64
End: 2019-02-19
Payer: COMMERCIAL

## 2019-02-19 VITALS
TEMPERATURE: 98 F | SYSTOLIC BLOOD PRESSURE: 138 MMHG | HEART RATE: 76 BPM | WEIGHT: 246 LBS | OXYGEN SATURATION: 98 % | BODY MASS INDEX: 45 KG/M2 | DIASTOLIC BLOOD PRESSURE: 88 MMHG

## 2019-02-19 DIAGNOSIS — J45.21 MILD INTERMITTENT ASTHMA WITH EXACERBATION: Primary | ICD-10-CM

## 2019-02-19 PROCEDURE — 99214 OFFICE O/P EST MOD 30 MIN: CPT | Performed by: INTERNAL MEDICINE

## 2019-02-19 RX ORDER — PREDNISONE 20 MG/1
TABLET ORAL
Qty: 10 TABLET | Refills: 0 | Status: SHIPPED | OUTPATIENT
Start: 2019-02-19 | End: 2019-03-04

## 2019-02-19 NOTE — PROGRESS NOTES
HPI:   Abhinav Benz is a 66year old female who presents for upper respiratory symptoms for  7  days. Patient reports sore throat, congestion, dry cough, wheezing, denies fever.       Current Outpatient Medications:  predniSONE 20 MG Oral Tab Take 1 ta Dysphagia 7/30/2011   • Esophageal reflux 5/8/2009   • Essential hypertension, benign 3/11/2008   • High blood pressure    • History of blood transfusion     platelets - 5 in the last 9 months   • MS (multiple sclerosis) ([facility]     diagnosed 30 years ago rashes  EYES:denies blurred vision or double vision  HEENT: congested  LUNGS: denies shortness of breath with exertion  CARDIOVASCULAR: denies chest pain on exertion  GI: no nausea or abdominal pain  NEURO: denies headaches    EXAM:   BP (!) 160/96 (BP Loc

## 2019-03-02 ENCOUNTER — TELEPHONE (OUTPATIENT)
Dept: FAMILY MEDICINE CLINIC | Facility: CLINIC | Age: 64
End: 2019-03-02

## 2019-03-02 ENCOUNTER — OFFICE VISIT (OUTPATIENT)
Dept: FAMILY MEDICINE CLINIC | Facility: CLINIC | Age: 64
End: 2019-03-02
Payer: COMMERCIAL

## 2019-03-02 VITALS
SYSTOLIC BLOOD PRESSURE: 150 MMHG | WEIGHT: 246 LBS | HEIGHT: 62 IN | HEART RATE: 100 BPM | DIASTOLIC BLOOD PRESSURE: 98 MMHG | TEMPERATURE: 97 F | BODY MASS INDEX: 45.27 KG/M2 | RESPIRATION RATE: 12 BRPM

## 2019-03-02 DIAGNOSIS — J01.00 ACUTE NON-RECURRENT MAXILLARY SINUSITIS: Primary | ICD-10-CM

## 2019-03-02 PROCEDURE — 99213 OFFICE O/P EST LOW 20 MIN: CPT | Performed by: INTERNAL MEDICINE

## 2019-03-02 NOTE — PROGRESS NOTES
HPI:   Isidoro Almaraz is a 66year old female who presents for upper respiratory symptoms for  10  days. Patient reports congestion, dry cough, laryngitis.       Current Outpatient Medications:  predniSONE 20 MG Oral Tab Take 3 tablets (60 mg total) by m blood transfusion     platelets - 5 in the last 9 months   • MS (multiple sclerosis) ([facility]     diagnosed 30 years ago   • Obesity, unspecified 5/27/2008   • Osteoarthrosis, unspecified whether generalized or localized, unspecified site 12/27/2011   • Other nausea or abdominal pain  NEURO: denies headaches    EXAM:   BP (!) 150/98 (BP Location: Right arm, Patient Position: Sitting, Cuff Size: large)   Pulse 100   Temp 97.1 °F (36.2 °C) (Temporal)   Resp 12   Ht 62\"   Wt 246 lb   LMP  (LMP Unknown)   BMI 44. 9

## 2019-03-02 NOTE — TELEPHONE ENCOUNTER
She said she had been feeling better and then last wed she started feeling bad again and she had \"no voice Thursday\". She started the antibiotic Wed that Dr Maicol Ely had prescribed. She is still hoarse but feeling slightly better today.  She is still using t

## 2019-03-06 ENCOUNTER — TELEPHONE (OUTPATIENT)
Dept: FAMILY MEDICINE CLINIC | Facility: CLINIC | Age: 64
End: 2019-03-06

## 2019-03-06 RX ORDER — BENZONATATE 200 MG/1
200 CAPSULE ORAL 3 TIMES DAILY PRN
Qty: 30 CAPSULE | Refills: 0 | Status: SHIPPED | OUTPATIENT
Start: 2019-03-06 | End: 2021-05-17

## 2019-03-06 NOTE — TELEPHONE ENCOUNTER
Patient said that she feels better but she still has a cough and severe laryngitis. She said that she has two more days left of antibiotics. She finished the steroids last Thursday or Friday. She is using the inhaler 3-4 times daily.

## 2019-03-13 ENCOUNTER — TELEPHONE (OUTPATIENT)
Dept: FAMILY MEDICINE CLINIC | Facility: CLINIC | Age: 64
End: 2019-03-13

## 2019-03-13 NOTE — TELEPHONE ENCOUNTER
Patient still does not have a voice, she said her sinuses keep \"dripping\". She said she has been taking Benadryl 3x daily but she cannot \"get anything out\". She never picked up the [place] because the insurance would not pay for it.  She said s

## 2019-03-18 ENCOUNTER — TELEPHONE (OUTPATIENT)
Dept: FAMILY MEDICINE CLINIC | Facility: CLINIC | Age: 64
End: 2019-03-18

## 2019-03-18 RX ORDER — MOMETASONE 50 UG/1
2 SPRAY, METERED NASAL DAILY
Qty: 1 INHALER | Refills: 3 | Status: SHIPPED | OUTPATIENT
Start: 2019-03-18 | End: 2020-01-14

## 2019-03-18 RX ORDER — GENTAMICIN SULFATE 3 MG/ML
1 SOLUTION/ DROPS OPHTHALMIC EVERY 4 HOURS
Qty: 5 ML | Refills: 0 | Status: SHIPPED | OUTPATIENT
Start: 2019-03-18 | End: 2019-03-23

## 2019-03-18 NOTE — TELEPHONE ENCOUNTER
SEND NASONEX TO [facility], HER  IS THERE NOW TO , IT IS BY PRESCRIPTION ONLY---NASACORT IS THE OTC ONE

## 2019-03-18 NOTE — TELEPHONE ENCOUNTER
Patient said that her eyes felt very dry and irritated last night and her vision looked cloudy and she had yellow drainage, eyes were crusted this morning. She said they are both slightly pink.  Her daughter and granddaughter had pink eye a couple weeks ago

## 2019-03-18 NOTE — TELEPHONE ENCOUNTER
It is probably adenovirus, it can cause drainage from the eyes, I think now you just just nasonex and nothing else, can use drop for eyes

## 2019-03-19 ENCOUNTER — TELEPHONE (OUTPATIENT)
Dept: FAMILY MEDICINE CLINIC | Facility: CLINIC | Age: 64
End: 2019-03-19

## 2019-03-19 NOTE — TELEPHONE ENCOUNTER
Patient said she started the nasal spray this morning. She said when she blows her nose she has severe pressure to the back of her head and nothing came out this was even before she used the nasal spray. Should she continue Mucinex or take something else?

## 2019-03-25 ENCOUNTER — TELEPHONE (OUTPATIENT)
Dept: FAMILY MEDICINE CLINIC | Facility: CLINIC | Age: 64
End: 2019-03-25

## 2019-03-25 RX ORDER — PREDNISONE 20 MG/1
40 TABLET ORAL DAILY
Qty: 10 TABLET | Refills: 0 | Status: SHIPPED | OUTPATIENT
Start: 2019-03-25 | End: 2019-03-30

## 2019-03-25 RX ORDER — CLINDAMYCIN HYDROCHLORIDE 300 MG/1
300 CAPSULE ORAL 3 TIMES DAILY
Qty: 30 CAPSULE | Refills: 0 | Status: SHIPPED | OUTPATIENT
Start: 2019-03-25 | End: 2019-04-04

## 2019-03-25 NOTE — TELEPHONE ENCOUNTER
Patient advised. She said that the pharmacist Alexander Daniels called her to tell her that if she has a problem with PCN she may have a reaction that is why she called us. Hardy at [place] [address] advised that Dr Zeinab William wants patient to take the Clindamycin.

## 2019-03-25 NOTE — TELEPHONE ENCOUNTER
SUBJECTIVE  Chief Complaint   Patient presents with    Knee Pain     f/u knee pain and physical therapy     The patient presents for follow-up of knee pains. She has had significant relief with PT. Her main complaint at this time is no longer pain and swelling but stiffness upon first standing. She has no recent injuries. She is happy with results thus far. OBJECTIVE    Blood pressure 102/68, pulse 81, temperature 97.7 °F (36.5 °C), temperature source Oral, resp. rate 14, height 5' 5\" (1.651 m), weight 186 lb (84.4 kg), last menstrual period 09/17/2018, SpO2 97 %. General:  alert, cooperative, well appearing, in no apparent distress. Bilateral Knee: Inspection of the knee demonstrates there is no obvious deformity. No effusions. Nontender. No laxity. Skin:  There is no redness or warmth. Psych: normal affect. Mood good. Oriented x 3. Judgement and insight intact. ASSESSMENT / PLAN    ICD-10-CM ICD-9-CM    1. Arthritis of knee M17.10 716.96    2. Patellofemoral disorder of both knees M22.2X1 719.96     M22.2X2     3. Obesity, unspecified classification, unspecified obesity type, unspecified whether serious comorbidity present E66.9 278.00    4. Encounter for immunization Z23 V03.89 IN IMMUNIZ ADMIN,1 SINGLE/COMB VAC/TOXOID      INFLUENZA VIRUS VAC QUAD,SPLIT,PRESV FREE SYRINGE IM     PFS and arthritis - X-rays reviewed. Improving in PT. Cont PT and transition to HEP. Weight loss to help with arthritis pain by unloading joints. Obesity - refer to dietician. Flu vaccine administered. 15 minutes of face to face time spent with the patient with at least 50% on counseling on above medical issues. All chart history elements were reviewed by me at the time of the visit even though marked at time of note closure. Patient understands our medical plan. Patient has provided input and agrees with goals. Alternatives have been explained and offered. All questions answered.   The patient That is not true. She can take clindamycin if she has a PNC allergy. Does the pharmacy have information we dont? is to call if condition worsens or fails to improve.      Follow-up Disposition:  Return in about 4 months (around 2/17/2019) for annual physical .

## 2019-03-25 NOTE — TELEPHONE ENCOUNTER
Patient still has laryngitis and she is \"coughing like crazy\". She said she has been up since 230am. She said that she feels \"crackling in upper airways\". She was crying on the phone stating she is just tired of being sick.  She is exhausted and does no

## 2019-03-25 NOTE — TELEPHONE ENCOUNTER
Clinda will cause diarrhea, so don't take anything preemptively until we see how your body responds.

## 2019-03-25 NOTE — TELEPHONE ENCOUNTER
Patient cannot take the new antibiotic prescribed because of her allergy to penicillin. Natividad Davalos asked to have  prescribe the original medication she was on.

## 2019-03-25 NOTE — TELEPHONE ENCOUNTER
Pharmacist said there is a small cross reactivity to PCN depending on the severity of her reaction to PCN. Reaction listed on patient's allergy list is hives and shaking.

## 2019-03-25 NOTE — TELEPHONE ENCOUNTER
Patient advised, she is nervous because she has never taken this before and wants to know if there is a reason she chose this drug instead of something she has taken and also why she did not order a steroid.

## 2019-03-25 NOTE — TELEPHONE ENCOUNTER
You were no cipro in feb, and levaquin is in the same family so will not work either, no PCN or sulfa due to allergy. This is the only choice for antibiotic and I can order prednisone but not a long taper too much risk for diarrhea.

## 2019-03-25 NOTE — TELEPHONE ENCOUNTER
Patient advised, she said that Prednisone causes constipation she would like Dr Lovely Hernandez to order some to [facility] please.

## 2019-04-19 RX ORDER — METOPROLOL SUCCINATE 50 MG/1
TABLET, EXTENDED RELEASE ORAL
Qty: 90 TABLET | Refills: 0 | Status: SHIPPED | OUTPATIENT
Start: 2019-04-19 | End: 2019-07-19

## 2019-04-19 NOTE — TELEPHONE ENCOUNTER
Last office visit 3-2-19 150/98  Last refill 1-22-19 #90  Future Appointments   Date Time Provider Richard Sapphire   5/29/2019  9:00 AM Yin Luis MD [facility] [place]   5/29/2019  9:45 AM REF [facility] REF [facility] Ref Lab [facility]

## 2019-04-29 RX ORDER — LISINOPRIL 30 MG/1
TABLET ORAL
Qty: 90 TABLET | Refills: 0 | Status: SHIPPED | OUTPATIENT
Start: 2019-04-29 | End: 2019-07-19

## 2019-04-29 NOTE — TELEPHONE ENCOUNTER
Last office visit on 3/2/19 /98  Last refill #90 on 1/21/19  Future Appointments   Date Time Provider Richard Sapphire   5/29/2019  9:00 AM Thierry Mckeon MD [facility] [place]   5/29/2019  9:45 AM REF [facility] REF [facility] Ref Lab [facility]

## 2019-05-13 RX ORDER — MONTELUKAST SODIUM 10 MG/1
TABLET ORAL
Qty: 270 TABLET | Refills: 0 | Status: SHIPPED | OUTPATIENT
Start: 2019-05-13 | End: 2020-02-14

## 2019-07-20 RX ORDER — METOPROLOL SUCCINATE 50 MG/1
TABLET, EXTENDED RELEASE ORAL
Qty: 90 TABLET | Refills: 0 | Status: SHIPPED | OUTPATIENT
Start: 2019-07-20 | End: 2019-10-16

## 2019-07-20 RX ORDER — LISINOPRIL 30 MG/1
TABLET ORAL
Qty: 90 TABLET | Refills: 0 | Status: SHIPPED | OUTPATIENT
Start: 2019-07-20 | End: 2019-10-16

## 2019-09-10 ENCOUNTER — TELEPHONE (OUTPATIENT)
Dept: FAMILY MEDICINE CLINIC | Facility: CLINIC | Age: 64
End: 2019-09-10

## 2019-09-10 RX ORDER — DESMOPRESSIN ACETATE 150/SPRAY
1 AEROSOL, SPRAY WITH PUMP (EA) NASAL DAILY PRN
Qty: 2.5 ML | Refills: 0 | Status: SHIPPED | OUTPATIENT
Start: 2019-09-10 | End: 2019-10-10

## 2019-09-10 NOTE — TELEPHONE ENCOUNTER
1. I dont think you need steroids for bowel issues, UNLESS you have collagenous colitis, Crohns or UC, they do more harm than good. Can precipitate C Diff OR if diverticular flare increase risk of microperforation.   2. Dont eat fast food, they have grocery

## 2019-09-10 NOTE — TELEPHONE ENCOUNTER
Patient said that she has not been taking her cholesterol medications, she never started it because she was worried about the side effects.  She said she is going to [place] in November and spoke with her specialists Dr Lucinda Streeter and Dr Brittany Cool about waiting un

## 2019-09-10 NOTE — TELEPHONE ENCOUNTER
Patient advised. She said that she is not taking the Zetia, should she? She also asked when she should have the colonoscopy redone. She said she needs a refill on a nasal spray but she said that she had severe joint pain after starting the Nasonex.  She darren

## 2019-09-11 NOTE — TELEPHONE ENCOUNTER
She should repeat colonoscopy in 3-5 years. 7/17--last done TBD in 7/20-22. I have her anesthesia notes from the date of her procedure, but not the pathology.   There was a gastric and colon polyps, but I need the path, so lets get it, 3 for tubular adenoma

## 2019-09-11 NOTE — TELEPHONE ENCOUNTER
Patient advised, letter up front for her to . Medical records request sent to [facility].

## 2019-10-16 RX ORDER — LISINOPRIL 30 MG/1
TABLET ORAL
Qty: 90 TABLET | Refills: 0 | Status: SHIPPED | OUTPATIENT
Start: 2019-10-16 | End: 2020-01-14

## 2019-10-17 ENCOUNTER — TELEPHONE (OUTPATIENT)
Dept: FAMILY MEDICINE CLINIC | Facility: CLINIC | Age: 64
End: 2019-10-17

## 2019-10-17 NOTE — TELEPHONE ENCOUNTER
Received a fax from [employer] stating second request for medication refill of Metoprolol ER 50mg. . It states there are already pending orders for this script.

## 2019-10-18 RX ORDER — METOPROLOL SUCCINATE 50 MG/1
TABLET, EXTENDED RELEASE ORAL
Qty: 90 TABLET | Refills: 0 | Status: SHIPPED | OUTPATIENT
Start: 2019-10-18 | End: 2020-01-14

## 2019-11-20 ENCOUNTER — OFFICE VISIT (OUTPATIENT)
Dept: FAMILY MEDICINE CLINIC | Facility: CLINIC | Age: 64
End: 2019-11-20
Payer: COMMERCIAL

## 2019-11-20 ENCOUNTER — LAB ENCOUNTER (OUTPATIENT)
Dept: LAB | Age: 64
End: 2019-11-20
Attending: INTERNAL MEDICINE
Payer: COMMERCIAL

## 2019-11-20 VITALS
WEIGHT: 246 LBS | TEMPERATURE: 97 F | SYSTOLIC BLOOD PRESSURE: 138 MMHG | HEART RATE: 80 BPM | HEIGHT: 62 IN | DIASTOLIC BLOOD PRESSURE: 88 MMHG | RESPIRATION RATE: 12 BRPM | BODY MASS INDEX: 45.27 KG/M2

## 2019-11-20 DIAGNOSIS — Z12.39 SCREENING FOR BREAST CANCER: Primary | ICD-10-CM

## 2019-11-20 DIAGNOSIS — G04.90 CEREBRITIS: ICD-10-CM

## 2019-11-20 DIAGNOSIS — D69.1 PLATELET DYSFUNCTION (HCC): ICD-10-CM

## 2019-11-20 DIAGNOSIS — I10 ESSENTIAL HYPERTENSION, BENIGN: ICD-10-CM

## 2019-11-20 DIAGNOSIS — R73.9 HYPERGLYCEMIA: ICD-10-CM

## 2019-11-20 DIAGNOSIS — E78.00 PURE HYPERCHOLESTEROLEMIA: ICD-10-CM

## 2019-11-20 DIAGNOSIS — R90.82 WHITE MATTER ABNORMALITY ON MRI OF BRAIN: ICD-10-CM

## 2019-11-20 PROCEDURE — 80053 COMPREHEN METABOLIC PANEL: CPT | Performed by: INTERNAL MEDICINE

## 2019-11-20 PROCEDURE — 99214 OFFICE O/P EST MOD 30 MIN: CPT | Performed by: INTERNAL MEDICINE

## 2019-11-20 PROCEDURE — 85025 COMPLETE CBC W/AUTO DIFF WBC: CPT | Performed by: INTERNAL MEDICINE

## 2019-11-20 PROCEDURE — 80061 LIPID PANEL: CPT | Performed by: INTERNAL MEDICINE

## 2019-11-20 PROCEDURE — 36415 COLL VENOUS BLD VENIPUNCTURE: CPT | Performed by: INTERNAL MEDICINE

## 2019-11-20 PROCEDURE — 83036 HEMOGLOBIN GLYCOSYLATED A1C: CPT | Performed by: INTERNAL MEDICINE

## 2019-11-20 NOTE — PROGRESS NOTES
Janice Steward is a 60year old female. HPI:   Pt has been having some conflicted feelings about the future. She has not been able to work since her brain injury.   Subsequently, it was found that she has a bleeding disorder and this may have been the cause unspecified 6/27/2007   • Blood disorder     ITP   • Dysphagia 7/30/2011   • Esophageal reflux 5/8/2009   • Essential hypertension, benign 3/11/2008   • High blood pressure    • History of blood transfusion     platelets - 5 in the last 9 months   • without murmur  GI: good BS's,no masses, HSM or tenderness  EXTREMITIES: no cyanosis, clubbing or edema    ASSESSMENT AND PLAN:     Screening for breast cancer  (primary encounter diagnosis)  Essential hypertension, benign  Pure hypercholesterolemia  Hyper

## 2019-12-23 ENCOUNTER — TELEPHONE (OUTPATIENT)
Dept: FAMILY MEDICINE CLINIC | Facility: CLINIC | Age: 64
End: 2019-12-23

## 2019-12-23 RX ORDER — ACYCLOVIR 800 MG/1
800 TABLET ORAL 2 TIMES DAILY
Qty: 10 TABLET | Refills: 0 | Status: SHIPPED | OUTPATIENT
Start: 2019-12-23 | End: 2019-12-28

## 2019-12-23 NOTE — TELEPHONE ENCOUNTER
Patient has a cold sore in the Right corner of her mouth. Can Dr Aga Armstrong call something in for her? Please call patient to advise.      [address]

## 2020-01-14 ENCOUNTER — TELEPHONE (OUTPATIENT)
Dept: FAMILY MEDICINE CLINIC | Facility: CLINIC | Age: 65
End: 2020-01-14

## 2020-01-14 RX ORDER — CIPROFLOXACIN 500 MG/1
500 TABLET, FILM COATED ORAL 2 TIMES DAILY
Qty: 20 TABLET | Refills: 0 | Status: SHIPPED | OUTPATIENT
Start: 2020-01-14 | End: 2020-01-24

## 2020-01-14 RX ORDER — MOMETASONE 50 UG/1
SPRAY, METERED NASAL
Qty: 17 G | Refills: 0 | Status: SHIPPED | OUTPATIENT
Start: 2020-01-14 | End: 2021-11-23

## 2020-01-14 RX ORDER — LISINOPRIL 30 MG/1
TABLET ORAL
Qty: 90 TABLET | Refills: 0 | Status: SHIPPED | OUTPATIENT
Start: 2020-01-14 | End: 2020-02-06

## 2020-01-14 RX ORDER — METOPROLOL SUCCINATE 50 MG/1
TABLET, EXTENDED RELEASE ORAL
Qty: 90 TABLET | Refills: 0 | Status: SHIPPED | OUTPATIENT
Start: 2020-01-14 | End: 2020-04-02

## 2020-01-14 NOTE — TELEPHONE ENCOUNTER
LOV: 11/20/19    LAST LAB: 3/18/19    LAST RX: Lisinopril 10/26/19, 90 tabs, 0 refills  Nasonex 3/18/19, 3 refills    Next OV: No future appointments.       PROTOCOL:  Hypertension Medications Protocol Passed1/14 12:01 PM   CMP or BMP in past 12 months    L

## 2020-01-14 NOTE — TELEPHONE ENCOUNTER
Patient said her nose has not stopped running since she spoke with us last year. She said that she has pressure and one side of her throat is hurting.  She said that she has copious amounts of thick green drainage coming out of her right nostril and she fee

## 2020-01-14 NOTE — TELEPHONE ENCOUNTER
LOV: 11/20/19    LAST LAB: 11/20/19    LAST RX: 10/18/19, 90 tabs, 0 refills    Next OV: No future appointments.       PROTOCOL  Hypertension Medications Protocol Passed1/14 12:01 PM   CMP or BMP in past 12 months    Last serum creatinine< 2.0    Appointmen

## 2020-01-15 ENCOUNTER — TELEPHONE (OUTPATIENT)
Dept: FAMILY MEDICINE CLINIC | Facility: CLINIC | Age: 65
End: 2020-01-15

## 2020-01-22 ENCOUNTER — TELEPHONE (OUTPATIENT)
Dept: FAMILY MEDICINE CLINIC | Facility: CLINIC | Age: 65
End: 2020-01-22

## 2020-01-22 NOTE — TELEPHONE ENCOUNTER
LM for patient to CB. Prior authorization denied for Mometasone Furoate The preferred alternatives are Flonase, Nasacort, Rhinocort.

## 2020-01-28 ENCOUNTER — MED REC SCAN ONLY (OUTPATIENT)
Dept: FAMILY MEDICINE CLINIC | Facility: CLINIC | Age: 65
End: 2020-01-28

## 2020-01-28 NOTE — TELEPHONE ENCOUNTER
Patient advised, she said she will try one of these other medications and will let us know if they are not effective.

## 2020-02-04 ENCOUNTER — TELEPHONE (OUTPATIENT)
Dept: FAMILY MEDICINE CLINIC | Facility: CLINIC | Age: 65
End: 2020-02-04

## 2020-02-04 NOTE — TELEPHONE ENCOUNTER
Patient said that she had her MRI January 13th and her blood pressure was high around 190/? Miles Belkys She said she had a headache last night and this morning.  She has been taking Zyrtec daily and wants to know if this could be causing her blood pressure to be eleva

## 2020-02-06 ENCOUNTER — OFFICE VISIT (OUTPATIENT)
Dept: FAMILY MEDICINE CLINIC | Facility: CLINIC | Age: 65
End: 2020-02-06
Payer: COMMERCIAL

## 2020-02-06 VITALS
HEIGHT: 62 IN | OXYGEN SATURATION: 98 % | BODY MASS INDEX: 43.94 KG/M2 | RESPIRATION RATE: 16 BRPM | HEART RATE: 91 BPM | WEIGHT: 238.81 LBS | DIASTOLIC BLOOD PRESSURE: 108 MMHG | SYSTOLIC BLOOD PRESSURE: 152 MMHG

## 2020-02-06 DIAGNOSIS — I10 ESSENTIAL HYPERTENSION, BENIGN: Primary | ICD-10-CM

## 2020-02-06 PROCEDURE — 99214 OFFICE O/P EST MOD 30 MIN: CPT | Performed by: INTERNAL MEDICINE

## 2020-02-06 RX ORDER — AMLODIPINE BESYLATE 10 MG/1
10 TABLET ORAL DAILY
Qty: 90 TABLET | Refills: 0 | Status: SHIPPED | OUTPATIENT
Start: 2020-02-06 | End: 2020-08-28

## 2020-02-06 RX ORDER — LISINOPRIL 10 MG/1
10 TABLET ORAL DAILY
Qty: 90 TABLET | Refills: 0 | Status: SHIPPED | OUTPATIENT
Start: 2020-02-06 | End: 2020-04-02

## 2020-02-06 NOTE — PROGRESS NOTES
Theo Venegas is a 60year old female. HPI:   Patient presents for recheck of her hypertension. Pt has been taking medications as instructed, no medication side effects, home BP monitoring in the range of 815-702'O systolic and 'G diastolic. needed for cough. 30 capsule 0   • Albuterol Sulfate HFA (PROAIR HFA) 108 (90 Base) MCG/ACT Inhalation Aero Soln Inhale 2 puffs into the lungs every 4 (four) hours as needed for Wheezing.  1 Inhaler 6   • ALPRAZOLAM 0.25 MG Oral Tab TAKE 1 TABLET BY MOUTH T 7/1/2016    Performed by Mar Snider DO at [facility] ENDOSCOPY   • COLONOSCOPY WITH POLYPECTOMY/SUBMUCOSAL INJECTION/CONTROL OF BLEEDING  7/1/2016    Performed by Mar Snider DO at [facility] ENDOSCOPY   • ESOPHAGOGASTRODUODENOSCOPY WITH BIOPSY  7/1/2016    P

## 2020-02-14 RX ORDER — MONTELUKAST SODIUM 10 MG/1
TABLET ORAL
Qty: 270 TABLET | Refills: 0 | Status: SHIPPED | OUTPATIENT
Start: 2020-02-14 | End: 2020-07-20

## 2020-02-26 ENCOUNTER — TELEPHONE (OUTPATIENT)
Dept: FAMILY MEDICINE CLINIC | Facility: CLINIC | Age: 65
End: 2020-02-26

## 2020-03-16 ENCOUNTER — TELEPHONE (OUTPATIENT)
Dept: FAMILY MEDICINE CLINIC | Facility: CLINIC | Age: 65
End: 2020-03-16

## 2020-03-16 RX ORDER — ALPRAZOLAM 0.25 MG/1
TABLET ORAL
Qty: 30 TABLET | Refills: 0 | Status: SHIPPED | OUTPATIENT
Start: 2020-03-16 | End: 2020-07-20

## 2020-03-16 NOTE — TELEPHONE ENCOUNTER
Requesting more ALPRAZolam (XANAX) 0.25 MG Oral Tab  She said that with everything going on she would like more then 30

## 2020-03-23 ENCOUNTER — TELEPHONE (OUTPATIENT)
Dept: FAMILY MEDICINE CLINIC | Facility: CLINIC | Age: 65
End: 2020-03-23

## 2020-03-23 NOTE — TELEPHONE ENCOUNTER
Pt states last wed went to sleep woke up at 4am thursday morning, had a little bit of the shakes, went back to sleep was fine all weekend, woke up at 12:30am today, she was shaking all over, was concerned it was a seizure, she spoke to neurologist about it

## 2020-04-02 ENCOUNTER — TELEPHONE (OUTPATIENT)
Dept: FAMILY MEDICINE CLINIC | Facility: CLINIC | Age: 65
End: 2020-04-02

## 2020-04-02 DIAGNOSIS — I10 ESSENTIAL HYPERTENSION, BENIGN: Primary | ICD-10-CM

## 2020-04-02 RX ORDER — LISINOPRIL 30 MG/1
TABLET ORAL
Qty: 90 TABLET | Refills: 0 | OUTPATIENT
Start: 2020-04-02

## 2020-04-02 RX ORDER — LISINOPRIL 10 MG/1
TABLET ORAL
Qty: 90 TABLET | Refills: 0 | Status: SHIPPED | OUTPATIENT
Start: 2020-04-02 | End: 2020-04-03 | Stop reason: DRUGHIGH

## 2020-04-02 RX ORDER — LISINOPRIL 30 MG/1
30 TABLET ORAL
Qty: 90 TABLET | Refills: 0 | Status: SHIPPED | OUTPATIENT
Start: 2020-04-02 | End: 2020-04-03 | Stop reason: DRUGHIGH

## 2020-04-02 RX ORDER — METOPROLOL SUCCINATE 50 MG/1
TABLET, EXTENDED RELEASE ORAL
Qty: 90 TABLET | Refills: 0 | Status: SHIPPED | OUTPATIENT
Start: 2020-04-02 | End: 2020-07-22

## 2020-04-02 NOTE — TELEPHONE ENCOUNTER
REFILL LISINOPRIL 40MG (DR INCREASED FROM 30 TO 40 @ LAST VISIT) & METOPROLOL ER SUCCINATE 50MG TO [facility], [facility] ALREADY HAS OVERRIDE CODE TO REFILL THESE EARLY, THEY ARE ALREADY GOING TO REFILL SEIZURE MEDS & SINGULAIR EARLY AS WELL

## 2020-04-03 RX ORDER — ALBUTEROL SULFATE 90 UG/1
2 AEROSOL, METERED RESPIRATORY (INHALATION) EVERY 6 HOURS PRN
Qty: 1 INHALER | Refills: 0 | Status: SHIPPED | OUTPATIENT
Start: 2020-04-03 | End: 2020-11-14

## 2020-04-03 RX ORDER — ALBUTEROL SULFATE 90 UG/1
AEROSOL, METERED RESPIRATORY (INHALATION)
Qty: 25.5 G | Refills: 0 | OUTPATIENT
Start: 2020-04-03

## 2020-04-03 RX ORDER — LISINOPRIL 40 MG/1
40 TABLET ORAL DAILY
Qty: 90 TABLET | Refills: 0 | Status: SHIPPED | OUTPATIENT
Start: 2020-04-03 | End: 2020-07-22

## 2020-04-03 NOTE — TELEPHONE ENCOUNTER
Lisinopril 40mg tab ordered; and [facility] [facility]. Also, asks to have on hand an albuterol inhaler, \"just in case\"  She is very concerned about the covig 19 pandemic to point of having had anxiety/panic attacks.     Scripts r

## 2020-07-20 ENCOUNTER — TELEPHONE (OUTPATIENT)
Dept: FAMILY MEDICINE CLINIC | Facility: CLINIC | Age: 65
End: 2020-07-20

## 2020-07-20 DIAGNOSIS — I10 ESSENTIAL HYPERTENSION, BENIGN: ICD-10-CM

## 2020-07-20 NOTE — TELEPHONE ENCOUNTER
Patient states that the paper work is coming attn: Mariah Santacruz RN. Patient would like to speak with Mariah Santacruz RN regarding paper work to be filled out.

## 2020-07-20 NOTE — TELEPHONE ENCOUNTER
Last office visit: 2/06/2020    Last CMP: 11/20/2019    Last refill for Levetiracetam: 7/21/2016  Last refill for Xanax: 3/16/2020  Last refill for Montelukast: 2/14/2020  Last refill for lisinopril: 4/03/2020  Requested Prescriptions     Pending Prescript

## 2020-07-20 NOTE — TELEPHONE ENCOUNTER
Pt called stating she would like her disability paperwork filled out. Social Security stated she needs a physical. Pt was last seen in 2/06/2020. Paperwork needs to be completed and returned in two weeks.  Pt also needs refills on medications and is aware s

## 2020-07-22 RX ORDER — LEVETIRACETAM 750 MG/1
750 TABLET ORAL 2 TIMES DAILY
Qty: 90 TABLET | Refills: 0 | Status: SHIPPED | OUTPATIENT
Start: 2020-07-22 | End: 2020-08-28

## 2020-07-22 RX ORDER — ALPRAZOLAM 0.25 MG/1
0.25 TABLET ORAL NIGHTLY PRN
Qty: 30 TABLET | Refills: 0 | Status: SHIPPED | OUTPATIENT
Start: 2020-07-22 | End: 2021-05-17

## 2020-07-22 RX ORDER — LISINOPRIL 40 MG/1
40 TABLET ORAL DAILY
Qty: 90 TABLET | Refills: 0 | Status: SHIPPED | OUTPATIENT
Start: 2020-07-22 | End: 2020-11-02

## 2020-07-22 RX ORDER — LEVETIRACETAM 750 MG/1
TABLET ORAL
Qty: 180 TABLET | Refills: 0 | OUTPATIENT
Start: 2020-07-22

## 2020-07-22 RX ORDER — METOPROLOL SUCCINATE 50 MG/1
50 TABLET, EXTENDED RELEASE ORAL DAILY
Qty: 90 TABLET | Refills: 0 | Status: SHIPPED | OUTPATIENT
Start: 2020-07-22 | End: 2020-08-27

## 2020-07-22 RX ORDER — MONTELUKAST SODIUM 10 MG/1
10 TABLET ORAL DAILY
Qty: 270 TABLET | Refills: 0 | Status: SHIPPED | OUTPATIENT
Start: 2020-07-22 | End: 2021-04-26

## 2020-07-22 NOTE — TELEPHONE ENCOUNTER
Last office visit: 2020  Hypertension Medication Protocol Failed  Last CMP: 2019  Last refill for Metoprolol Succinate ER 50 m2020  No future appointments. Per Dr. Lovely Hernandez, ok'd to refill. Pt is aware she is due for labs.  Waiting for

## 2020-07-30 NOTE — TELEPHONE ENCOUNTER
Pt advised paperwork is being filled out, however she needs to come in for Dr. Kizzy Ugarte to discuss and lab work. Pt verbalized understanding and appointment was scheduled.    Future Appointments   Date Time Provider Richard Leary   8/1/2020  8:30 AM Kizzy Ugarte,

## 2020-08-01 ENCOUNTER — OFFICE VISIT (OUTPATIENT)
Dept: FAMILY MEDICINE CLINIC | Facility: CLINIC | Age: 65
End: 2020-08-01
Payer: COMMERCIAL

## 2020-08-01 ENCOUNTER — TELEPHONE (OUTPATIENT)
Dept: FAMILY MEDICINE CLINIC | Facility: CLINIC | Age: 65
End: 2020-08-01

## 2020-08-01 VITALS
WEIGHT: 246 LBS | DIASTOLIC BLOOD PRESSURE: 80 MMHG | HEART RATE: 85 BPM | BODY MASS INDEX: 45.27 KG/M2 | OXYGEN SATURATION: 98 % | HEIGHT: 62 IN | TEMPERATURE: 97 F | SYSTOLIC BLOOD PRESSURE: 150 MMHG

## 2020-08-01 DIAGNOSIS — I10 ESSENTIAL HYPERTENSION, BENIGN: Primary | ICD-10-CM

## 2020-08-01 DIAGNOSIS — E66.09 CLASS 2 OBESITY DUE TO EXCESS CALORIES WITHOUT SERIOUS COMORBIDITY WITH BODY MASS INDEX (BMI) OF 36.0 TO 36.9 IN ADULT: ICD-10-CM

## 2020-08-01 DIAGNOSIS — G40.802 OTHER EPILEPSY WITHOUT STATUS EPILEPTICUS, NOT INTRACTABLE (HCC): ICD-10-CM

## 2020-08-01 DIAGNOSIS — E78.00 PURE HYPERCHOLESTEROLEMIA: ICD-10-CM

## 2020-08-01 DIAGNOSIS — R90.82 WHITE MATTER ABNORMALITY ON MRI OF BRAIN: ICD-10-CM

## 2020-08-01 DIAGNOSIS — E11.9 TYPE 2 DIABETES MELLITUS WITHOUT COMPLICATION, WITHOUT LONG-TERM CURRENT USE OF INSULIN (HCC): ICD-10-CM

## 2020-08-01 LAB
ALBUMIN SERPL-MCNC: 4.1 G/DL (ref 3.4–5)
ALBUMIN/GLOB SERPL: 1 {RATIO} (ref 1–2)
ALP LIVER SERPL-CCNC: 88 U/L (ref 50–130)
ALT SERPL-CCNC: 21 U/L (ref 13–56)
ANION GAP SERPL CALC-SCNC: 3 MMOL/L (ref 0–18)
AST SERPL-CCNC: 18 U/L (ref 15–37)
BASOPHILS # BLD AUTO: 0.07 X10(3) UL (ref 0–0.2)
BASOPHILS NFR BLD AUTO: 1 %
BILIRUB SERPL-MCNC: 0.9 MG/DL (ref 0.1–2)
BUN BLD-MCNC: 16 MG/DL (ref 7–18)
BUN/CREAT SERPL: 16.8 (ref 10–20)
CALCIUM BLD-MCNC: 9.4 MG/DL (ref 8.5–10.1)
CHLORIDE SERPL-SCNC: 103 MMOL/L (ref 98–112)
CHOLEST SMN-MCNC: 219 MG/DL (ref ?–200)
CO2 SERPL-SCNC: 29 MMOL/L (ref 21–32)
CREAT BLD-MCNC: 0.95 MG/DL (ref 0.55–1.02)
DEPRECATED RDW RBC AUTO: 46.3 FL (ref 35.1–46.3)
EOSINOPHIL # BLD AUTO: 0.14 X10(3) UL (ref 0–0.7)
EOSINOPHIL NFR BLD AUTO: 2 %
ERYTHROCYTE [DISTWIDTH] IN BLOOD BY AUTOMATED COUNT: 13.6 % (ref 11–15)
EST. AVERAGE GLUCOSE BLD GHB EST-MCNC: 160 MG/DL (ref 68–126)
GLOBULIN PLAS-MCNC: 4 G/DL (ref 2.8–4.4)
GLUCOSE BLD-MCNC: 163 MG/DL (ref 70–99)
HBA1C MFR BLD HPLC: 7.2 % (ref ?–5.7)
HCT VFR BLD AUTO: 42.5 % (ref 35–48)
HDLC SERPL-MCNC: 32 MG/DL (ref 40–59)
HGB BLD-MCNC: 13.4 G/DL (ref 12–16)
IMM GRANULOCYTES # BLD AUTO: 0.02 X10(3) UL (ref 0–1)
IMM GRANULOCYTES NFR BLD: 0.3 %
LDLC SERPL CALC-MCNC: 121 MG/DL (ref ?–100)
LYMPHOCYTES # BLD AUTO: 1.57 X10(3) UL (ref 1–4)
LYMPHOCYTES NFR BLD AUTO: 22.5 %
M PROTEIN MFR SERPL ELPH: 8.1 G/DL (ref 6.4–8.2)
MCH RBC QN AUTO: 29.3 PG (ref 26–34)
MCHC RBC AUTO-ENTMCNC: 31.5 G/DL (ref 31–37)
MCV RBC AUTO: 93 FL (ref 80–100)
MONOCYTES # BLD AUTO: 0.4 X10(3) UL (ref 0.1–1)
MONOCYTES NFR BLD AUTO: 5.7 %
NEUTROPHILS # BLD AUTO: 4.78 X10 (3) UL (ref 1.5–7.7)
NEUTROPHILS # BLD AUTO: 4.78 X10(3) UL (ref 1.5–7.7)
NEUTROPHILS NFR BLD AUTO: 68.5 %
NONHDLC SERPL-MCNC: 187 MG/DL (ref ?–130)
OSMOLALITY SERPL CALC.SUM OF ELEC: 285 MOSM/KG (ref 275–295)
PATIENT FASTING Y/N/NP: YES
PATIENT FASTING Y/N/NP: YES
PLATELET # BLD AUTO: 155 10(3)UL (ref 150–450)
POTASSIUM SERPL-SCNC: 4.6 MMOL/L (ref 3.5–5.1)
RBC # BLD AUTO: 4.57 X10(6)UL (ref 3.8–5.3)
SODIUM SERPL-SCNC: 135 MMOL/L (ref 136–145)
TRIGL SERPL-MCNC: 329 MG/DL (ref 30–149)
VLDLC SERPL CALC-MCNC: 66 MG/DL (ref 0–30)
WBC # BLD AUTO: 7 X10(3) UL (ref 4–11)

## 2020-08-01 PROCEDURE — 80053 COMPREHEN METABOLIC PANEL: CPT | Performed by: INTERNAL MEDICINE

## 2020-08-01 PROCEDURE — 80061 LIPID PANEL: CPT | Performed by: INTERNAL MEDICINE

## 2020-08-01 PROCEDURE — 99214 OFFICE O/P EST MOD 30 MIN: CPT | Performed by: INTERNAL MEDICINE

## 2020-08-01 PROCEDURE — 83036 HEMOGLOBIN GLYCOSYLATED A1C: CPT | Performed by: INTERNAL MEDICINE

## 2020-08-01 PROCEDURE — 85025 COMPLETE CBC W/AUTO DIFF WBC: CPT | Performed by: INTERNAL MEDICINE

## 2020-08-01 PROCEDURE — 3079F DIAST BP 80-89 MM HG: CPT | Performed by: INTERNAL MEDICINE

## 2020-08-01 PROCEDURE — 36415 COLL VENOUS BLD VENIPUNCTURE: CPT | Performed by: INTERNAL MEDICINE

## 2020-08-01 PROCEDURE — 3077F SYST BP >= 140 MM HG: CPT | Performed by: INTERNAL MEDICINE

## 2020-08-01 PROCEDURE — 3008F BODY MASS INDEX DOCD: CPT | Performed by: INTERNAL MEDICINE

## 2020-08-01 RX ORDER — GLIMEPIRIDE 2 MG/1
2 TABLET ORAL
Qty: 30 TABLET | Refills: 0 | Status: SHIPPED | OUTPATIENT
Start: 2020-08-01 | End: 2020-08-03

## 2020-08-01 RX ORDER — SIMVASTATIN 20 MG
20 TABLET ORAL NIGHTLY
Qty: 90 TABLET | Refills: 0 | Status: SHIPPED | OUTPATIENT
Start: 2020-08-01 | End: 2020-08-03 | Stop reason: ALTCHOICE

## 2020-08-01 NOTE — TELEPHONE ENCOUNTER
PT WANTS COPY OF PAPERWORK DR Morgan Cabrera IS FILLING OUT LEFT UP FRONT FOR HER TO  AS WELL AS HAVING IT FAXED

## 2020-08-02 NOTE — PROGRESS NOTES
HPI:   Per Bhatia is a 60year old female who presents for recheck of her diabetes. She was steroid induced , but has not been on steroids recently. Last visit with ophthalmologist was unknown. Pt has been checking her feet on a regular basis.  Pt tablet 0   • Montelukast Sodium 10 MG Oral Tab Take 1 tablet (10 mg total) by mouth daily. 270 tablet 0   • levetiracetam 750 MG Oral Tab Take 1 tablet (750 mg total) by mouth 2 (two) times daily.  90 tablet 0   • Metoprolol Succinate ER 50 MG Oral Tablet 2 2015   • Unspecified asthma(493.90) 3/23/2007   • Unspecified essential hypertension    • Unspecified hypothyroidism 10/10/2007   • Urge incontinence    • Uterovaginal prolapse, incomplete 10/9/2007   • Visual impairment     glasses      Past Surgical Hist monofilament   Extensive neuro exam done and documented on forms for disability. Please see document for details     ASSESSMENT AND PLAN:   Ruy Shannon is a 60year old female who presents for a recheck of her diabetes. Diabetic control is unknown.

## 2020-08-03 ENCOUNTER — TELEPHONE (OUTPATIENT)
Dept: FAMILY MEDICINE CLINIC | Facility: CLINIC | Age: 65
End: 2020-08-03

## 2020-08-03 DIAGNOSIS — E11.9 TYPE 2 DIABETES MELLITUS WITHOUT COMPLICATION, WITHOUT LONG-TERM CURRENT USE OF INSULIN (HCC): Primary | ICD-10-CM

## 2020-08-03 RX ORDER — EZETIMIBE 10 MG/1
10 TABLET ORAL DAILY
Qty: 90 TABLET | Refills: 0 | Status: SHIPPED | OUTPATIENT
Start: 2020-08-03 | End: 2020-11-01

## 2020-08-03 RX ORDER — GLIMEPIRIDE 2 MG/1
TABLET ORAL
Qty: 90 TABLET | Refills: 0 | OUTPATIENT
Start: 2020-08-03

## 2020-08-03 RX ORDER — GLIMEPIRIDE 2 MG/1
2 TABLET ORAL
Qty: 90 TABLET | Refills: 1 | Status: SHIPPED | OUTPATIENT
Start: 2020-08-03 | End: 2020-08-04

## 2020-08-03 NOTE — TELEPHONE ENCOUNTER
Patient advised paperwork was put up front for her and also faxed to [facility] [phone]. Patient said that she will drop off a urine for Microabumine tomorrow.  She said she started Amlodipine today and has not had any issues so  Patient said she saw result

## 2020-08-03 NOTE — TELEPHONE ENCOUNTER
Patient advised. She asked if she should be checking her blood sugars in the AM, advised yes once daily. She said that she has a glucometer at home but does not know if she needs test strips or lancets she will let us know what kind.  She asked what her blo

## 2020-08-03 NOTE — TELEPHONE ENCOUNTER
Natividad Davalos is calling she was wondering about the paperwork and urine that Nestor Wisemanph and spoke with her about on 8/1/2020

## 2020-08-04 ENCOUNTER — TELEPHONE (OUTPATIENT)
Dept: FAMILY MEDICINE CLINIC | Facility: CLINIC | Age: 65
End: 2020-08-04

## 2020-08-04 LAB
CREAT UR-SCNC: 66.8 MG/DL
MICROALBUMIN UR-MCNC: 0.62 MG/DL
MICROALBUMIN/CREAT 24H UR-RTO: 9.3 UG/MG (ref ?–30)

## 2020-08-04 PROCEDURE — 82570 ASSAY OF URINE CREATININE: CPT | Performed by: INTERNAL MEDICINE

## 2020-08-04 PROCEDURE — 82043 UR ALBUMIN QUANTITATIVE: CPT | Performed by: INTERNAL MEDICINE

## 2020-08-04 RX ORDER — LANCETS
1 EACH MISCELLANEOUS DAILY
Qty: 1 BOX | Refills: 0 | Status: SHIPPED | OUTPATIENT
Start: 2020-08-04 | End: 2020-10-29

## 2020-08-04 NOTE — TELEPHONE ENCOUNTER
Patient said she has an [address]. Strips and lancets sent to [facility]. Patient said that she has been taking Vitamin D3 1000 IUs and noticed several small bruises on her arms so stopped it. Can she resume this?

## 2020-08-04 NOTE — TELEPHONE ENCOUNTER
glimepiride 2 MG Oral Tab  Pt. Allergic to sulfa so they want to verify this is the medication you want to keep pt.  On. Pt. Told pharmacy it was a severe reaction to sulfa

## 2020-08-05 ENCOUNTER — TELEPHONE (OUTPATIENT)
Dept: FAMILY MEDICINE CLINIC | Facility: CLINIC | Age: 65
End: 2020-08-05

## 2020-08-05 NOTE — TELEPHONE ENCOUNTER
N,o we never have samples try good rx or use long term pharmacy. She cannot take the generics due to allergy or diarrhea.

## 2020-08-10 NOTE — TELEPHONE ENCOUNTER
Last refill: 09/10/19  Qty: 2.5mL  W/ 0 refills  Last ov: 08/01/20    Requested Prescriptions     Pending Prescriptions Disp Refills   • STIMATE 1.5 MG/ML Nasal Solution [Pharmacy Med Name: STIMATE 1.5MG/ML NASAL SPRAY 25DOSE] 2.5 mL 0     Sig: USE 1 SPRAY

## 2020-08-11 RX ORDER — DESMOPRESSIN ACETATE 1.5 MG/ML
SPRAY, METERED NASAL
Qty: 2.5 ML | Refills: 0 | Status: SHIPPED | OUTPATIENT
Start: 2020-08-11 | End: 2021-05-17

## 2020-08-13 ENCOUNTER — TELEPHONE (OUTPATIENT)
Dept: FAMILY MEDICINE CLINIC | Facility: CLINIC | Age: 65
End: 2020-08-13

## 2020-08-13 NOTE — TELEPHONE ENCOUNTER
[place] would like to speak with Sandra Butler about her medications, she thinks that she is having side effects from ezetimibe (ZETIA) 10 MG Oral Tab

## 2020-08-13 NOTE — TELEPHONE ENCOUNTER
Patient states that she believes that she is having a reaction to one of her new medications. She believes that it could be the zetia or the [place]. Patient believes that it is the zetia because she has been on Januvia before.   Patient has had a difficu

## 2020-08-19 ENCOUNTER — TELEPHONE (OUTPATIENT)
Dept: FAMILY MEDICINE CLINIC | Facility: CLINIC | Age: 65
End: 2020-08-19

## 2020-08-20 NOTE — TELEPHONE ENCOUNTER
ROBERT for patient to  925am. Has she seen a counselor ot psychiatrist? The  office wants paperwork filled out for psychiatric issues.

## 2020-08-20 NOTE — TELEPHONE ENCOUNTER
Patient said that she has been seeing Dagoberto Whiteside in [facility] at [facility] [phone]. She will come in to sign a release for Dr Yanni Adair to speak with Papo Burgos.

## 2020-08-24 NOTE — TELEPHONE ENCOUNTER
Brianne Presley called to see if Dr Zenon Spatz spoke with the therapist. She said the state is calling her regarding her paperwork . She said that Leonel Koyanagi is in the office tomorrow.

## 2020-08-26 ENCOUNTER — TELEPHONE (OUTPATIENT)
Dept: FAMILY MEDICINE CLINIC | Facility: CLINIC | Age: 65
End: 2020-08-26

## 2020-08-26 NOTE — TELEPHONE ENCOUNTER
This is regarding the paperwork needed for the social security department that patient wants Dr Lovely Hernandez to fill out.

## 2020-08-26 NOTE — TELEPHONE ENCOUNTER
Eligha Rubinstein called back. Call on cell [phone] she is busy the rest of the day so Dr. Maicol Ely can call her back tomorrow.

## 2020-08-26 NOTE — TELEPHONE ENCOUNTER
Patient called and advised that Kimi Carr's phone number is [phone]. Needle needed this information.

## 2020-08-27 ENCOUNTER — TELEPHONE (OUTPATIENT)
Dept: FAMILY MEDICINE CLINIC | Facility: CLINIC | Age: 65
End: 2020-08-27

## 2020-08-27 RX ORDER — METOPROLOL SUCCINATE 50 MG/1
50 TABLET, EXTENDED RELEASE ORAL DAILY
Qty: 90 TABLET | Refills: 0 | COMMUNITY
Start: 2020-08-27 | End: 2020-12-15

## 2020-08-27 NOTE — TELEPHONE ENCOUNTER
Finally got hold of Sai I dont see that she picked up any amlodipine, her lisinopril is 40 not 30 and metoprolol XR 50. The alprazoplam or clonazepam can lower blood pressure did you take any benzos last night? ?  I will have paper work done by [employer]

## 2020-08-27 NOTE — TELEPHONE ENCOUNTER
Patient said she felt \"tired\" yesterday. She said that she has lost some weight and her blood pressure has been going down. BP was 115/76 on 8/5, 116/68 on 8/13. She said she felt extremly tired and like she might faint her BP was 78/56 and pulse was 66.

## 2020-08-27 NOTE — TELEPHONE ENCOUNTER
Jaylon Alas is calling she would like to speak with [employer], her blood pressure is low 78/56. Sarita is felling lightheaded and fatigue.   Please call

## 2020-08-27 NOTE — TELEPHONE ENCOUNTER
Patient advised. She said that she has always had \"stomach issues\" and since starting the Amlodipine she has had normal bms and has been able to eat salads. She wants to know if the Lisinopril or Metoprolol can be adjusted.  Which one is less harmful for

## 2020-08-27 NOTE — TELEPHONE ENCOUNTER
Patient said that she did not take any Benzos last night. She said she just rechecked her blood pressure and it was 112/63. She said she was outside and sweating, she had not had anything to drink.  She said that she got the Amlodipine in February but did n

## 2020-08-27 NOTE — TELEPHONE ENCOUNTER
Hypertension Medications Protocol Passed8/27 3:28 PM   CMP or BMP in past 12 months    Last serum creatinine< 2.0    Appointment in past 6 or next 3 months     LAst OV 8/1/20-HTN  Last refill on Amlodipine 2/6/20 #90, patient just started taking earlier th

## 2020-08-28 RX ORDER — LEVETIRACETAM 750 MG/1
TABLET ORAL
Qty: 180 TABLET | Refills: 0 | Status: SHIPPED | OUTPATIENT
Start: 2020-08-28 | End: 2020-12-15

## 2020-08-28 RX ORDER — AMLODIPINE BESYLATE 10 MG/1
TABLET ORAL
Qty: 90 TABLET | Refills: 0 | Status: SHIPPED | OUTPATIENT
Start: 2020-08-28 | End: 2021-05-17

## 2020-08-29 ENCOUNTER — TELEPHONE (OUTPATIENT)
Dept: FAMILY MEDICINE CLINIC | Facility: CLINIC | Age: 65
End: 2020-08-29

## 2020-08-29 NOTE — TELEPHONE ENCOUNTER
Patient has question for Dr. Lovely Hernandez. She has a lot of questions for Dr. Lovely Hernandez following up from previous conversations. Her blood pressure was extremely low. She was advised to stop Amlodipine.  She was concerned about stopping it due to it helping with

## 2020-08-29 NOTE — TELEPHONE ENCOUNTER
Pt advised. States she has many questions and concerns, but doesn't want to come into the office.   [employer] appt scheduled on 8/31

## 2020-08-31 ENCOUNTER — TELEPHONE (OUTPATIENT)
Dept: FAMILY MEDICINE CLINIC | Facility: CLINIC | Age: 65
End: 2020-08-31

## 2020-08-31 ENCOUNTER — TELEMEDICINE (OUTPATIENT)
Dept: FAMILY MEDICINE CLINIC | Facility: CLINIC | Age: 65
End: 2020-08-31
Payer: COMMERCIAL

## 2020-08-31 DIAGNOSIS — I10 ESSENTIAL HYPERTENSION, BENIGN: Primary | ICD-10-CM

## 2020-08-31 PROCEDURE — 99214 OFFICE O/P EST MOD 30 MIN: CPT | Performed by: INTERNAL MEDICINE

## 2020-08-31 NOTE — TELEPHONE ENCOUNTER
Igentry with Social Security is calling because they have not received the forms that they need, Edward Velasco said that there is a deadline and it's coming up.  Please call

## 2020-08-31 NOTE — PROGRESS NOTES
Shiela Cope is a 60year old female. HPI:   Patient presents for recheck of her hypertension. Pt has been taking medications as instructed, no medication side effects, home BP monitoring in the range of 327-945'Y systolic and 32-81'G diastolic. Finger stick route daily. Use as directed. 1 Box 0   • SITagliptin Phosphate 50 MG Oral Tab Take 1 tablet (50 mg total) by mouth daily. 90 tablet 0   • ezetimibe (ZETIA) 10 MG Oral Tab Take 1 tablet (10 mg total) by mouth daily.  90 tablet 0   • lisinopril joint, lower leg 12/27/2011   • Postmenopausal atrophic vaginitis 6/27/2007   • Pure hypercholesterolemia 10/10/2007   • Seizure disorder [facility])     last episode Sept 14, 2015   • Unspecified asthma(493.90) 3/23/2007   • Unspecified essential hypertension diet, exercise and weight control. The patient indicates understanding of these issues and agrees to the plan. The patient is asked to return in 1 MONTH, WILL CONTINUE TO CHECK BP BID.

## 2020-10-28 ENCOUNTER — PATIENT MESSAGE (OUTPATIENT)
Dept: FAMILY MEDICINE CLINIC | Facility: CLINIC | Age: 65
End: 2020-10-28

## 2020-10-28 ENCOUNTER — LABORATORY ENCOUNTER (OUTPATIENT)
Dept: LAB | Age: 65
End: 2020-10-28
Attending: INTERNAL MEDICINE
Payer: COMMERCIAL

## 2020-10-28 ENCOUNTER — TELEPHONE (OUTPATIENT)
Dept: FAMILY MEDICINE CLINIC | Facility: CLINIC | Age: 65
End: 2020-10-28

## 2020-10-28 DIAGNOSIS — E11.9 TYPE 2 DIABETES MELLITUS WITHOUT COMPLICATION, WITHOUT LONG-TERM CURRENT USE OF INSULIN (HCC): ICD-10-CM

## 2020-10-28 PROCEDURE — 83036 HEMOGLOBIN GLYCOSYLATED A1C: CPT | Performed by: INTERNAL MEDICINE

## 2020-10-28 PROCEDURE — 36415 COLL VENOUS BLD VENIPUNCTURE: CPT | Performed by: INTERNAL MEDICINE

## 2020-10-28 NOTE — TELEPHONE ENCOUNTER
Patient said that she got a message that she is due for a dilated diabetic eye exam,. She said she got one at [facility] this year. LM for [employer] to fax us results.

## 2020-10-29 ENCOUNTER — PATIENT MESSAGE (OUTPATIENT)
Dept: FAMILY MEDICINE CLINIC | Facility: CLINIC | Age: 65
End: 2020-10-29

## 2020-10-29 RX ORDER — LANCETS
1 EACH MISCELLANEOUS DAILY
Qty: 1 BOX | Refills: 0 | Status: SHIPPED | OUTPATIENT
Start: 2020-10-29 | End: 2021-03-30

## 2020-10-29 NOTE — TELEPHONE ENCOUNTER
Patient wants to know if she should continue taking the Januvia and testing her blood sugar. She has lost 20 pounds. Blood sugars have been around 100 in the morning.  Patient advised she can stop Januvia and recheck in 6 months because she doesn't want to

## 2020-10-29 NOTE — TELEPHONE ENCOUNTER
From: Janice Steward  To: Alberto Hahn MD  Sent: 10/29/2020 10:24 AM CDT  Subject: Test Results Question    Adrian Carrera, To go over test results and a few ?’s for Dr. Tammy Alcaraz.

## 2020-11-01 DIAGNOSIS — I10 ESSENTIAL HYPERTENSION, BENIGN: ICD-10-CM

## 2020-11-01 RX ORDER — LISINOPRIL 40 MG/1
TABLET ORAL
Qty: 90 TABLET | Refills: 0 | Status: CANCELLED | OUTPATIENT
Start: 2020-11-01

## 2020-11-02 ENCOUNTER — TELEPHONE (OUTPATIENT)
Dept: FAMILY MEDICINE CLINIC | Facility: CLINIC | Age: 65
End: 2020-11-02

## 2020-11-02 RX ORDER — SITAGLIPTIN 50 MG/1
TABLET, FILM COATED ORAL
Qty: 90 TABLET | Refills: 0 | OUTPATIENT
Start: 2020-11-02

## 2020-11-02 RX ORDER — LISINOPRIL 40 MG/1
TABLET ORAL
Qty: 90 TABLET | Refills: 0 | Status: SHIPPED | OUTPATIENT
Start: 2020-11-02 | End: 2021-02-01

## 2020-11-02 NOTE — TELEPHONE ENCOUNTER
Last OV w/Dr Don Goddard 8/31/20-telemed  Last CMP 8/1/20  Last refill 7/22/20  Januvia was refilled 10/29/20  Refilled per protocol.

## 2020-11-14 RX ORDER — ALBUTEROL SULFATE 90 UG/1
AEROSOL, METERED RESPIRATORY (INHALATION)
Qty: 8.5 G | Refills: 0 | Status: SHIPPED | OUTPATIENT
Start: 2020-11-14 | End: 2020-11-23

## 2020-11-23 ENCOUNTER — TELEMEDICINE (OUTPATIENT)
Dept: FAMILY MEDICINE CLINIC | Facility: CLINIC | Age: 65
End: 2020-11-23
Payer: MEDICARE

## 2020-11-23 ENCOUNTER — TELEPHONE (OUTPATIENT)
Dept: FAMILY MEDICINE CLINIC | Facility: CLINIC | Age: 65
End: 2020-11-23

## 2020-11-23 DIAGNOSIS — J01.11 ACUTE RECURRENT FRONTAL SINUSITIS: Primary | ICD-10-CM

## 2020-11-23 PROCEDURE — 99214 OFFICE O/P EST MOD 30 MIN: CPT | Performed by: INTERNAL MEDICINE

## 2020-11-23 RX ORDER — CIPROFLOXACIN 500 MG/1
500 TABLET, FILM COATED ORAL 2 TIMES DAILY
Qty: 20 TABLET | Refills: 0 | Status: SHIPPED | OUTPATIENT
Start: 2020-11-23 | End: 2020-12-03

## 2020-11-23 RX ORDER — ALBUTEROL SULFATE 90 UG/1
2 AEROSOL, METERED RESPIRATORY (INHALATION) EVERY 6 HOURS PRN
Qty: 8.5 G | Refills: 2 | Status: SHIPPED | OUTPATIENT
Start: 2020-11-23 | End: 2021-11-23

## 2020-11-23 RX ORDER — BUDESONIDE AND FORMOTEROL FUMARATE DIHYDRATE 80; 4.5 UG/1; UG/1
2 AEROSOL RESPIRATORY (INHALATION) 2 TIMES DAILY
Qty: 1 INHALER | Refills: 3 | Status: SHIPPED | OUTPATIENT
Start: 2020-11-23 | End: 2020-12-23

## 2020-11-23 NOTE — TELEPHONE ENCOUNTER
Juana is calling she would like to speak with Yevgeniy Poe RN about a really bad bloody nose she had on Saturday night, please call

## 2020-11-23 NOTE — TELEPHONE ENCOUNTER
Patient said that she has been using her inhaler 3x daily. She said she has been having sinus symptoms. She has been taking Montelukast and using Xlear saline solution and also regular saline solution.  She said she got a bloody nose Saturday night after bl

## 2020-11-23 NOTE — PROGRESS NOTES
HPI:   Hill Augustin is a 63year old female who presents for upper respiratory symptoms for  10  days. Patient reports congestion, bloody colored nasal discharge. She was using a nasal corticosteriod and has a platelet disorder.   She is better now , b 200 MG Oral Tab Take 1 tablet (200 mg total) by mouth 3 (three) times daily as needed for Pain. 20 tablet 0   • saccharomyces boulardii (FLORASTOR) 250 MG Oral Cap Take 1 capsule (250 mg total) by mouth 2 (two) times daily.  60 capsule 11      Past Medical Social History    Tobacco Use      Smoking status: Never Smoker      Smokeless tobacco: Never Used      Tobacco comment: Non-smoker    Alcohol use:  Yes      Alcohol/week: 1.0 standard drinks      Types: 1 Glasses of wine per week      Comment: kimberlyn DARLING

## 2020-12-07 ENCOUNTER — TELEPHONE (OUTPATIENT)
Dept: FAMILY MEDICINE CLINIC | Facility: CLINIC | Age: 65
End: 2020-12-07

## 2020-12-07 RX ORDER — PREDNISONE 20 MG/1
40 TABLET ORAL DAILY
Qty: 14 TABLET | Refills: 0 | Status: SHIPPED | OUTPATIENT
Start: 2020-12-07 | End: 2020-12-14

## 2020-12-07 NOTE — TELEPHONE ENCOUNTER
Yes, on the steroid and I think by the time the vaccine is offered to the general public you will be able to receive it.

## 2020-12-07 NOTE — TELEPHONE ENCOUNTER
Patient said that she finished her antibiotics, and she is still slightly hoarse. She said that she is still coughing slightly and feels like it is in her chest.. She said the drainage is better than what it was.  She is still using the Symbicort and Albute

## 2020-12-15 ENCOUNTER — MED REC SCAN ONLY (OUTPATIENT)
Dept: FAMILY MEDICINE CLINIC | Facility: CLINIC | Age: 65
End: 2020-12-15

## 2020-12-15 RX ORDER — LEVETIRACETAM 750 MG/1
750 TABLET ORAL 2 TIMES DAILY
Qty: 180 TABLET | Refills: 0 | Status: SHIPPED | OUTPATIENT
Start: 2020-12-15 | End: 2021-03-30

## 2020-12-15 RX ORDER — METOPROLOL SUCCINATE 50 MG/1
50 TABLET, EXTENDED RELEASE ORAL DAILY
Qty: 90 TABLET | Refills: 0 | Status: SHIPPED | OUTPATIENT
Start: 2020-12-15 | End: 2021-03-15

## 2020-12-15 NOTE — TELEPHONE ENCOUNTER
Last OV w/Dr Winston Reich  11/23/20-telemed  Last CMP 8/1/20  Last refills 8/28/20 #180 and 8/27/20 #90  No future appointments scheduled

## 2021-02-01 DIAGNOSIS — I10 ESSENTIAL HYPERTENSION, BENIGN: ICD-10-CM

## 2021-02-01 RX ORDER — LISINOPRIL 40 MG/1
40 TABLET ORAL DAILY
Qty: 90 TABLET | Refills: 0 | Status: SHIPPED | OUTPATIENT
Start: 2021-02-01 | End: 2021-04-26

## 2021-02-01 NOTE — TELEPHONE ENCOUNTER
Last OV w/Dr Lovely Hernandez 11/23/20-telemed sinusitis  Last CMP 8/1/20  Last refill 11/2/20 #90  No future appointments scheduled.

## 2021-03-13 ENCOUNTER — TELEPHONE (OUTPATIENT)
Dept: FAMILY MEDICINE CLINIC | Facility: CLINIC | Age: 66
End: 2021-03-13

## 2021-03-13 NOTE — TELEPHONE ENCOUNTER
Patient advised ok to get Covid vaccine. Is it ok to continue using the steroid nasal spray? Advised no oral steroids or injection when getting Covid vaccine.  CY Rod RN

## 2021-03-15 RX ORDER — METOPROLOL SUCCINATE 50 MG/1
TABLET, EXTENDED RELEASE ORAL
Qty: 90 TABLET | Refills: 0 | Status: SHIPPED | OUTPATIENT
Start: 2021-03-15 | End: 2021-07-03

## 2021-03-15 NOTE — TELEPHONE ENCOUNTER
Last refill #90 on 12/15/2020  Last office visit pertaining to refill on 8/31/2020 - televisit  Future Appointments   Date Time Provider Richard [place]   5/17/2021  9:00 AM MD HALIMA Terrazas [facility] [place]   5/17/2021  9:30 AM MD HERMAN TerrazasS

## 2021-03-20 DIAGNOSIS — Z23 NEED FOR VACCINATION: ICD-10-CM

## 2021-03-30 RX ORDER — LANCETS
EACH MISCELLANEOUS
Qty: 102 EACH | Refills: 0 | Status: SHIPPED | OUTPATIENT
Start: 2021-03-30

## 2021-03-30 RX ORDER — BLOOD SUGAR DIAGNOSTIC
STRIP MISCELLANEOUS
Qty: 100 STRIP | Refills: 0 | Status: SHIPPED | OUTPATIENT
Start: 2021-03-30

## 2021-03-30 RX ORDER — LEVETIRACETAM 750 MG/1
TABLET ORAL
Qty: 180 TABLET | Refills: 0 | Status: SHIPPED | OUTPATIENT
Start: 2021-03-30 | End: 2021-07-07

## 2021-03-30 NOTE — TELEPHONE ENCOUNTER
Last OV w/Dr Toi Larsen 11/23/20-telemed  Last Hgba1c 10/28/20  Last refill on lancets and strips 10/29/20  Last refill on Levetiracetam #180 on 12/15/20

## 2021-04-26 ENCOUNTER — TELEPHONE (OUTPATIENT)
Dept: FAMILY MEDICINE CLINIC | Facility: CLINIC | Age: 66
End: 2021-04-26

## 2021-04-26 ENCOUNTER — MED REC SCAN ONLY (OUTPATIENT)
Dept: FAMILY MEDICINE CLINIC | Facility: CLINIC | Age: 66
End: 2021-04-26

## 2021-04-26 DIAGNOSIS — I10 ESSENTIAL HYPERTENSION, BENIGN: ICD-10-CM

## 2021-04-26 RX ORDER — MONTELUKAST SODIUM 10 MG/1
TABLET ORAL
Qty: 270 TABLET | Refills: 0 | Status: SHIPPED | OUTPATIENT
Start: 2021-04-26 | End: 2022-01-05

## 2021-04-26 RX ORDER — LISINOPRIL 40 MG/1
40 TABLET ORAL DAILY
Qty: 90 TABLET | Refills: 0 | Status: SHIPPED | OUTPATIENT
Start: 2021-04-26 | End: 2021-07-20

## 2021-04-26 NOTE — TELEPHONE ENCOUNTER
LOV 08/01/2020        LAST RX 07/22/2020 0 refills 270 tablets    Next OV   Future Appointments   Date Time Provider [facility] [place]   5/17/2021  9:00 AM Jennifer Fonseca MD [facility] [place]   5/17/2021  9:30 AM MD HALIMA Cedeno [facility] [place]

## 2021-04-29 ENCOUNTER — OFFICE VISIT (OUTPATIENT)
Dept: FAMILY MEDICINE CLINIC | Facility: CLINIC | Age: 66
End: 2021-04-29
Payer: MEDICARE

## 2021-04-29 VITALS
BODY MASS INDEX: 40 KG/M2 | SYSTOLIC BLOOD PRESSURE: 148 MMHG | WEIGHT: 217 LBS | HEART RATE: 108 BPM | OXYGEN SATURATION: 97 % | DIASTOLIC BLOOD PRESSURE: 88 MMHG | TEMPERATURE: 98 F

## 2021-04-29 DIAGNOSIS — J06.9 ACUTE URI: Primary | ICD-10-CM

## 2021-04-29 DIAGNOSIS — J30.2 SEASONAL ALLERGIC RHINITIS, UNSPECIFIED TRIGGER: ICD-10-CM

## 2021-04-29 PROCEDURE — 99213 OFFICE O/P EST LOW 20 MIN: CPT | Performed by: FAMILY MEDICINE

## 2021-04-29 NOTE — PROGRESS NOTES
HPI/Subjective:   Patient ID: Dixie Moreno is a 63year old female. Patient in contact with somebody with Covid. Her grandson. Positive cough starting today. Low-grade temperature. Fatigue, body aches.   States similar symptoms to when she had her (four) hours as needed for Wheezing. 1 Inhaler 6   • Phenazopyridine HCl 200 MG Oral Tab Take 1 tablet (200 mg total) by mouth 3 (three) times daily as needed for Pain.  20 tablet 0   • saccharomyces boulardii (FLORASTOR) 250 MG Oral Cap Take 1 capsule (250 encounter.       Meds This Visit:  Requested Prescriptions      No prescriptions requested or ordered in this encounter       Imaging & Referrals:  None

## 2021-05-05 NOTE — TELEPHONE ENCOUNTER
Questions about sinus infection Referred by: No ref. provider found; Medical Diagnosis (from order):      Daily Treatment Note    Visit:  3   Patient alert and oriented X3.    SUBJECTIVE                                                                                                             Patient returns today after weekend trip to [place]. patient reports that she felt amazing in [place] and was able to do a great deal of hiking. Patient feels that the return flight seemed to cause pain to return. Reports that she would like to be able to leg onto her thigh to put on her socks and tie shoes.     Pain / Symptoms:  Pain rating (out of 10): Current: 9     OBJECTIVE                                                                                                                        TREATMENT                                                                                                                  Therapeutic Exercise:  Nu- Step warm up in preparation for stretching: discussed functional status and verbal review of home exercise program x 6 minutes    Standing bent over posterior chain stretching x 4 minutes  Lateral and anterior posterior weight shifting, pelvic circling     Seated hamstring stretch 2 x20 seconds    Seated on ball:  Forward flexion stretching, direct anterior and toward both feet. 2 x 10 seconds each angle    Seated:  Forward flexion stretching, direct anterior and toward both feet. 2 x 10 seconds each angle              Therapeutic Activity:  Discussion of sitting options for work. Patient has swiss ball access to trial. Recommended patient perform seated stretches as performed today, briefly/ frequently through the day to help with sx management upon rising. Patient agreeable to these suggestions.     Neuromuscular Re-Education:   Abdominal activation- performed seated on swiss ball (blue):   Basic weight shifting on ball (anterior/posterior, lateral, circling) 10 each  Abdominal brace with single leg mini march x 8  each side    Seated abdominal brace with resisted upper extremity extension  using purple theraband 2 x15  Seated abdominal brace with resisted rowing 2 x15      Skilled input: verbal instruction/cues, posture correction and as detailed above    Writer verbally educated and received verbal consent for hand placement, positioning of patient, and techniques to be performed today from patient for clothing adjustments for techniques, therapist position for techniques and hand placement and palpation for techniques as described above and how they are pertinent to the patient's plan of care.    Home Exercise Program: Access Code: [email or web address]  URL: [email or web address]  Date: 04/29/2021  Prepared by: Virginia Lindquist    Exercises  Seated Shoulder Extension and Scapular Retraction with Resistance - 1 x daily - 2 sets - 8 reps  Seated Shoulder Front Raise with Resistance - 1 x daily - 2 sets - 8 reps       ASSESSMENT                                                                                                             Patient report drop in pain posterior treatment. Continues to find relief with gentle movement.   Pain/symptoms after session (out of 10): 6    Patient Education:   Results of above outlined education: Verbalizes understanding and Needs reinforcement      PLAN                                                                                                                           Suggestions for next session as indicated: Progress per plan of care    Consider hip mobilization with belt to improve flexion/ external rotation ease for lower extremity dressing         Therapy procedure time and total treatment time can be found documented on the Time Entry flowsheet

## 2021-05-17 ENCOUNTER — OFFICE VISIT (OUTPATIENT)
Dept: LAB | Age: 66
End: 2021-05-17
Attending: INTERNAL MEDICINE
Payer: MEDICARE

## 2021-05-17 ENCOUNTER — OFFICE VISIT (OUTPATIENT)
Dept: FAMILY MEDICINE CLINIC | Facility: CLINIC | Age: 66
End: 2021-05-17
Payer: MEDICARE

## 2021-05-17 ENCOUNTER — PATIENT MESSAGE (OUTPATIENT)
Dept: FAMILY MEDICINE CLINIC | Facility: CLINIC | Age: 66
End: 2021-05-17

## 2021-05-17 VITALS
RESPIRATION RATE: 18 BRPM | OXYGEN SATURATION: 98 % | WEIGHT: 213 LBS | DIASTOLIC BLOOD PRESSURE: 82 MMHG | BODY MASS INDEX: 39.2 KG/M2 | TEMPERATURE: 99 F | HEIGHT: 62 IN | HEART RATE: 96 BPM | SYSTOLIC BLOOD PRESSURE: 140 MMHG

## 2021-05-17 DIAGNOSIS — Z12.31 VISIT FOR SCREENING MAMMOGRAM: ICD-10-CM

## 2021-05-17 DIAGNOSIS — E11.9 TYPE 2 DIABETES MELLITUS WITHOUT COMPLICATION, WITHOUT LONG-TERM CURRENT USE OF INSULIN (HCC): ICD-10-CM

## 2021-05-17 DIAGNOSIS — I10 ESSENTIAL HYPERTENSION, BENIGN: Primary | ICD-10-CM

## 2021-05-17 DIAGNOSIS — Z11.59 NEED FOR HEPATITIS C SCREENING TEST: ICD-10-CM

## 2021-05-17 DIAGNOSIS — J30.2 SEASONAL ALLERGIC RHINITIS, UNSPECIFIED TRIGGER: ICD-10-CM

## 2021-05-17 DIAGNOSIS — I10 ESSENTIAL HYPERTENSION, BENIGN: ICD-10-CM

## 2021-05-17 DIAGNOSIS — I10 ESSENTIAL HYPERTENSION: ICD-10-CM

## 2021-05-17 DIAGNOSIS — Z13.1 SCREENING FOR DIABETES MELLITUS (DM): ICD-10-CM

## 2021-05-17 DIAGNOSIS — R90.82 WHITE MATTER ABNORMALITY ON MRI OF BRAIN: ICD-10-CM

## 2021-05-17 DIAGNOSIS — D69.1 PLATELET DYSFUNCTION (HCC): ICD-10-CM

## 2021-05-17 DIAGNOSIS — Z00.00 ENCOUNTER FOR ANNUAL HEALTH EXAMINATION: ICD-10-CM

## 2021-05-17 PROCEDURE — G0402 INITIAL PREVENTIVE EXAM: HCPCS | Performed by: INTERNAL MEDICINE

## 2021-05-17 PROCEDURE — 80061 LIPID PANEL: CPT

## 2021-05-17 RX ORDER — ERGOCALCIFEROL 1.25 MG/1
1000 CAPSULE ORAL
COMMUNITY
End: 2021-05-17

## 2021-05-17 RX ORDER — CLONAZEPAM 0.5 MG/1
0.5 TABLET ORAL AS NEEDED
COMMUNITY
Start: 2020-07-15 | End: 2021-05-17

## 2021-05-17 RX ORDER — ALPRAZOLAM 0.25 MG/1
0.25 TABLET ORAL NIGHTLY PRN
Qty: 30 TABLET | Refills: 0 | Status: SHIPPED | OUTPATIENT
Start: 2021-05-17 | End: 2021-06-21

## 2021-05-17 RX ORDER — BUDESONIDE AND FORMOTEROL FUMARATE DIHYDRATE 80; 4.5 UG/1; UG/1
2 AEROSOL RESPIRATORY (INHALATION) 2 TIMES DAILY
COMMUNITY
Start: 2021-01-25

## 2021-05-17 RX ORDER — DESMOPRESSIN ACETATE 1.5 MG/ML
1 SPRAY, METERED NASAL DAILY PRN
Qty: 2.5 ML | Refills: 0 | Status: SHIPPED | OUTPATIENT
Start: 2021-05-17 | End: 2021-06-16

## 2021-05-17 NOTE — PROGRESS NOTES
HPI:   Shiela Cope is a 63year old female who presents for a Medicare Initial Annual Wellness visit (Once after 12 month Medicare anniversary) . Pt has been in quarantine for 18 months. Depressed, but now immunized and feeling more confident. asthma     Seasonal allergic rhinitis     Postmenopausal atrophic vaginitis     Uterovaginal prolapse, incomplete     Pure hypercholesterolemia     Unspecified hypothyroidism     Essential hypertension, benign     Obesity, unspecified     Esophageal reflux mouth daily.  )  Albuterol Sulfate  (90 Base) MCG/ACT Inhalation Aero Soln, Inhale 2 puffs into the lungs every 6 (six) hours as needed for Wheezing.   MOMETASONE FUROATE 50 MCG/ACT Nasal Suspension, SHAKE LIQUID AND USE 2 SPRAYS IN EACH NOSTRIL JANE drugs.     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 5. Seasonal allergic rhinitis, unspecified trigger  Still needs medications    6. Visit for screening mammogram  ordered  - [facility] SCREENING BILAT (CPT=77067); Future    7.  Encounter for annual health examination flowsheet data found. Fecal Occult Blood Annually No results found for: FOB No flowsheet data found.     Glaucoma Screening      Ophthalmology Visit Annually: Diabetics, FHx Glaucoma, AA>50, > 65 Data entered on: 10/29/2020   Last Dilated Eye Ex Monitoring of Persistent     Medications (ACE/ARB, digoxin diuretics, anticonvulsants.)    Potassium  Annually Potassium (mmol/L)   Date Value   08/01/2020 4.6     POTASSIUM (mmol/L)   Date Value   11/26/2013 4.0   12/07/2012 4.0    No flowsheet data found

## 2021-05-17 NOTE — PATIENT INSTRUCTIONS
Sarita Robertson's SCREENING SCHEDULE   Tests on this list are recommended by your physician but may not be covered, or covered at this frequency, by your insurer. Please check with your insurance carrier before scheduling to verify coverage.    [employer] to patients who meet one of the following criteria:   • Men who are 73-68 years old and have smoked more than 100 cigarettes in their lifetime   • Anyone with a family history    Colorectal Cancer Screening  Covered up to Age 76     Colonoscopy Scr Influenza  Covered Annually No orders found for this or any previous visit.  Please get every year    Pneumococcal 13 (Prevnar)  Covered Once after 65 Orders placed or performed in visit on 06/11/16   • PNEUMOCOCCAL VACC, 13 TRE IM    Please get once after [place])  [email or web address]  This link also has information from the [address] [place] [address] regarding Advance Directives.

## 2021-05-17 NOTE — TELEPHONE ENCOUNTER
From: Nilda Stearns  To:  Caesar Kan MD  Sent: 5/17/2021 12:15 PM CDT  Subject: Other    Here is my COVID-19 Vaccine picture

## 2021-05-18 ENCOUNTER — PATIENT MESSAGE (OUTPATIENT)
Dept: FAMILY MEDICINE CLINIC | Facility: CLINIC | Age: 66
End: 2021-05-18

## 2021-05-18 NOTE — TELEPHONE ENCOUNTER
From: Nilda Stearns  To: Caesar Kan MD  Sent: 5/18/2021 8:35 AM CDT  Subject: Test Results Question    Have a few ?’s on some lab results. Plus sending over a picture of a essential oil I use to help for my lungs.

## 2021-05-22 ENCOUNTER — MED REC SCAN ONLY (OUTPATIENT)
Dept: FAMILY MEDICINE CLINIC | Facility: CLINIC | Age: 66
End: 2021-05-22

## 2021-05-22 ENCOUNTER — TELEPHONE (OUTPATIENT)
Dept: FAMILY MEDICINE CLINIC | Facility: CLINIC | Age: 66
End: 2021-05-22

## 2021-06-21 RX ORDER — ALPRAZOLAM 0.25 MG/1
0.25 TABLET ORAL NIGHTLY PRN
Qty: 30 TABLET | Refills: 0 | Status: SHIPPED | OUTPATIENT
Start: 2021-06-21 | End: 2021-12-27

## 2021-06-21 NOTE — TELEPHONE ENCOUNTER
Last refill #30 on 5/17/2021  Last office visit pertaining to refill on 5/17/2021  No future appointments.

## 2021-07-03 NOTE — TELEPHONE ENCOUNTER
Last OV w/Dr Sheila Vizcaino 5/17/21  Last CMP 5/17/21  Last refill on Metoprolol 3/15/21, last refill on Keppra 3/30/21

## 2021-07-07 RX ORDER — LEVETIRACETAM 750 MG/1
TABLET ORAL
Qty: 180 TABLET | Refills: 0 | Status: SHIPPED | OUTPATIENT
Start: 2021-07-07 | End: 2021-10-07

## 2021-07-07 RX ORDER — METOPROLOL SUCCINATE 50 MG/1
TABLET, EXTENDED RELEASE ORAL
Qty: 90 TABLET | Refills: 0 | Status: SHIPPED | OUTPATIENT
Start: 2021-07-07 | End: 2021-11-23

## 2021-07-19 DIAGNOSIS — I10 ESSENTIAL HYPERTENSION, BENIGN: ICD-10-CM

## 2021-07-20 RX ORDER — LISINOPRIL 40 MG/1
TABLET ORAL
Qty: 90 TABLET | Refills: 0 | Status: SHIPPED | OUTPATIENT
Start: 2021-07-20 | End: 2021-08-11

## 2021-07-20 NOTE — TELEPHONE ENCOUNTER
Last refill #90 on 4/26/2021  Last office visit pertaining to refill on 5/17/2021  No future appointments.   BP Readings from Last 3 Encounters:  05/17/21 : 140/82  04/29/21 : 148/88  08/01/20 : 150/80    Labs current  Refilled per protocol

## 2021-08-11 ENCOUNTER — TELEPHONE (OUTPATIENT)
Dept: FAMILY MEDICINE CLINIC | Facility: CLINIC | Age: 66
End: 2021-08-11

## 2021-08-11 DIAGNOSIS — I10 ESSENTIAL HYPERTENSION, BENIGN: ICD-10-CM

## 2021-08-11 RX ORDER — LISINOPRIL 40 MG/1
40 TABLET ORAL DAILY
Qty: 90 TABLET | Refills: 0 | Status: SHIPPED | OUTPATIENT
Start: 2021-08-11 | End: 2021-08-11

## 2021-08-11 RX ORDER — LISINOPRIL 40 MG/1
40 TABLET ORAL DAILY
Qty: 90 TABLET | Refills: 0 | Status: SHIPPED | OUTPATIENT
Start: 2021-08-11 | End: 2022-01-18

## 2021-08-11 NOTE — TELEPHONE ENCOUNTER
MARY CALLED TO LET YOU KNW THAT WHEN YOU RECEIVE HER REFILL NOTICE FROM [employer] THAT IT HAS TO BE THE LUPIN BRAND

## 2021-08-11 NOTE — TELEPHONE ENCOUNTER
Spoke to patient she will schedule her mammogram. She is due for a pap smear stated that ever time she comes in Dr mendoza says she does not need to have one done. She also said she dropped her lisinopril 40mg.  And needs another  Prescription called into the

## 2021-08-23 ENCOUNTER — HOSPITAL ENCOUNTER (OUTPATIENT)
Dept: MAMMOGRAPHY | Age: 66
Discharge: HOME | End: 2021-08-23
Attending: INTERNAL MEDICINE
Payer: MEDICARE

## 2021-08-23 DIAGNOSIS — Z12.31 VISIT FOR SCREENING MAMMOGRAM: ICD-10-CM

## 2021-08-23 PROCEDURE — 77067 SCR MAMMO BI INCL CAD: CPT | Performed by: INTERNAL MEDICINE

## 2021-08-31 ENCOUNTER — HOSPITAL ENCOUNTER (OUTPATIENT)
Dept: MAMMOGRAPHY | Facility: HOSPITAL | Age: 66
Discharge: HOME | End: 2021-08-31
Attending: INTERNAL MEDICINE
Payer: MEDICARE

## 2021-08-31 DIAGNOSIS — R92.2 INCONCLUSIVE MAMMOGRAM: ICD-10-CM

## 2021-08-31 PROCEDURE — 77061 BREAST TOMOSYNTHESIS UNI: CPT | Performed by: INTERNAL MEDICINE

## 2021-08-31 PROCEDURE — 77065 DX MAMMO INCL CAD UNI: CPT | Performed by: INTERNAL MEDICINE

## 2021-08-31 PROCEDURE — 76642 ULTRASOUND BREAST LIMITED: CPT | Performed by: INTERNAL MEDICINE

## 2021-10-07 RX ORDER — LEVETIRACETAM 750 MG/1
TABLET ORAL
Qty: 180 TABLET | Refills: 0 | Status: SHIPPED | OUTPATIENT
Start: 2021-10-07 | End: 2022-01-05

## 2021-10-07 NOTE — TELEPHONE ENCOUNTER
LOV 5/17/21    LAST LAB 5/17/21    LAST RX 7/7/21 x 90 days    Next OV No future appointments.       PROTOCOL

## 2021-11-02 ENCOUNTER — OFFICE VISIT (OUTPATIENT)
Dept: FAMILY MEDICINE CLINIC | Facility: CLINIC | Age: 66
End: 2021-11-02
Payer: MEDICARE

## 2021-11-02 VITALS
RESPIRATION RATE: 15 BRPM | HEART RATE: 77 BPM | DIASTOLIC BLOOD PRESSURE: 83 MMHG | BODY MASS INDEX: 39 KG/M2 | OXYGEN SATURATION: 99 % | SYSTOLIC BLOOD PRESSURE: 166 MMHG | TEMPERATURE: 98 F | HEIGHT: 62 IN

## 2021-11-02 DIAGNOSIS — R06.02 SHORTNESS OF BREATH: ICD-10-CM

## 2021-11-02 DIAGNOSIS — R53.83 OTHER FATIGUE: Primary | ICD-10-CM

## 2021-11-02 DIAGNOSIS — R07.89 CHEST PRESSURE: ICD-10-CM

## 2021-11-02 PROCEDURE — 99213 OFFICE O/P EST LOW 20 MIN: CPT | Performed by: PHYSICIAN ASSISTANT

## 2021-11-02 NOTE — PROGRESS NOTES
CHIEF COMPLAINT:   Patient presents with:  Fatigue    HPI:   Isidoro Almaraz is a 63year old female who presents for covid 19 testing.   Patient reports she has been fatigued since yesterday and her  had a temp this morning, so she thought they britt Inhalation Aero Soln Inhale 2 puffs into the lungs every 4 (four) hours as needed for Wheezing. 1 Inhaler 6   • saccharomyces boulardii (FLORASTOR) 250 MG Oral Cap Take 1 capsule (250 mg total) by mouth 2 (two) times daily.  60 capsule 11   • LEVETIRACETAM DILATION/BAND LIGATION;  Surgeon: Linda Stallworth DO;  Location: [address] [place] [address] ENDOSCOPY   • COLONOSCOPY WITH BIOPSY  7/1/2016    Procedure: COLONOSCOPY WITH BIOPSY;  Surgeon: Linda Stallworth DO;  Location: [address] [place] [address] ENDOSCOPY   • EGD  7/1/2016    Procedure: [facility] hour(s)). ASSESSMENT AND PLAN:   Isidoro Almaraz is a 63year old female who presents with symptoms that are consistent with    ASSESSMENT:   Other fatigue  (primary encounter diagnosis)  Shortness of breath  Chest pressure    PLAN: Meds as below.   Se notify your family and friends with whom you have had close contact recently (starting 2 days before you first had symptoms). What counts as close contact?     * You were within 6 feet of someone who has COVID-19 for at least 15 minutes  * You provided transportation, ridesharing, or taxis. 2. Monitor your symptoms carefully. If your symptoms get worse, call your healthcare provider immediately. 3. Get rest and stay hydrated.    4. If you have a medical appointment, call the healthcare provider ahead o passed since recovery defined as resolution of fever without the use of fever-reducing medications; and  · Improvement in respiratory symptoms (e.g., cough, shortness of breath); and  · At least 10 days have passed since symptoms first appeared OR if asymp plasma is very similar to donating blood. [employer] (a large blood research institute in [address] [place] [address] and one of [employer]’s blood product suppliers) is coordinating plasma donations.     If you would be interested in donating your plasma to help treat o fog or trouble concentrating   Headaches Sleeping difficulties   Anxiety or depression Loss of taste or smell   Chest pain  Palpitations Light headedness  Muscle Pain   Increased Heart Rate Tingling, numbness, or burning sensation   Shortness of breath Nicolette Feldman to your heart can cause chest pain. These include:  · Musculoskeletal. Costochondritis is an inflammation of the tissues around the ribs that can occur from trauma or overuse injuries, or a strain of the muscles of the chest wall  · Respiratory.  Pneumonia, issues and agrees to the plan.

## 2021-11-02 NOTE — PATIENT INSTRUCTIONS
-You have been advised to go to a higher level of care.    -Push fluids, rest, tylenol as needed    Coronavirus Disease 2019 (COVID-19)     [facility] is committed to the safety and well-being of our patients, members, employees, and communities quarantine  • After 14 days from date of last exposure  • After 10 days without testing from date of last exposure  • After day 7 from date of last exposure with a negative test result (test must occur on day 5 or later)  After stopping quarantine, you lazaro doorknobs. Use household cleaning sprays or wipes according to the label instructions.          Seek Further Care     If you are awaiting test results or are confirmed positive for COVID -19, and your symptoms worsen at home with symptoms such as: extreme w business days, please call your primary care provider or check Mixbookhart for results. Post-Discharge Follow-up  If you are diagnosed with COVID, refrain from exercise until approved by your primary care provider.  Please call your primary care provider wit [email or web address].pt. pdf  Centers for Disease Control & Prevention (CDC)  10 things you can do to manage your health at home, Gopi.nl. pdf  ht people    References:  Long haulers: Why some people experience long-term coronavirus symptoms. (2021, February 08). Retrieved March 17, 2021, from https://health.[facility]/coronavirus/covid-19-information/covid-19-long-ronald. html  Long-term effects of 911 if any of these occur:  · A change in the type of pain: if it feels different, becomes more severe, lasts longer, or begins to spread into your shoulder, arm, neck, jaw or back  · Shortness of breath or increased pain with breathing  · Weakness, dizzin

## 2021-11-04 ENCOUNTER — PATIENT MESSAGE (OUTPATIENT)
Dept: FAMILY MEDICINE CLINIC | Facility: CLINIC | Age: 66
End: 2021-11-04

## 2021-11-04 NOTE — TELEPHONE ENCOUNTER
From: Jag Doctor  To:  Bethanie Acevedo MD  Sent: 11/4/2021 2:05 PM CDT  Subject: Covid test results    Went to [place] [facility] Tuesday was wondering if my results came in from Covid or if you could call and find out

## 2021-11-15 ENCOUNTER — TELEPHONE (OUTPATIENT)
Dept: FAMILY MEDICINE CLINIC | Facility: CLINIC | Age: 66
End: 2021-11-15

## 2021-11-15 ENCOUNTER — TELEMEDICINE (OUTPATIENT)
Dept: FAMILY MEDICINE CLINIC | Facility: CLINIC | Age: 66
End: 2021-11-15

## 2021-11-15 DIAGNOSIS — J01.01 ACUTE RECURRENT MAXILLARY SINUSITIS: Primary | ICD-10-CM

## 2021-11-15 PROCEDURE — 99213 OFFICE O/P EST LOW 20 MIN: CPT | Performed by: INTERNAL MEDICINE

## 2021-11-15 RX ORDER — ALBUTEROL SULFATE 90 UG/1
2 AEROSOL, METERED RESPIRATORY (INHALATION) EVERY 6 HOURS PRN
Qty: 1 EACH | Refills: 0 | Status: SHIPPED | OUTPATIENT
Start: 2021-11-15 | End: 2021-11-23

## 2021-11-15 RX ORDER — AZITHROMYCIN 250 MG/1
TABLET, FILM COATED ORAL
Qty: 6 TABLET | Refills: 0 | Status: SHIPPED | OUTPATIENT
Start: 2021-11-15 | End: 2021-11-20

## 2021-11-15 NOTE — TELEPHONE ENCOUNTER
Patient said that she only needs one Albuterol inhaler last time she got 3. Can Dr Junaid Henderson prescribe the antibiotic before she leaves for the day please?

## 2021-11-15 NOTE — TELEPHONE ENCOUNTER
Patient had appt this morning.  was going to send prescription for antibiotic & inhaler. The pharmacy hasn't received anything yet. She wanted us to call in the prescription instead of sending it electronically.   She wanted to discuss the inhaler befo

## 2021-11-23 ENCOUNTER — OFFICE VISIT (OUTPATIENT)
Dept: FAMILY MEDICINE CLINIC | Facility: CLINIC | Age: 66
End: 2021-11-23
Payer: MEDICARE

## 2021-11-23 ENCOUNTER — LAB ENCOUNTER (OUTPATIENT)
Dept: LAB | Age: 66
End: 2021-11-23
Attending: INTERNAL MEDICINE
Payer: MEDICARE

## 2021-11-23 VITALS
BODY MASS INDEX: 40.94 KG/M2 | HEIGHT: 62 IN | HEART RATE: 80 BPM | DIASTOLIC BLOOD PRESSURE: 96 MMHG | OXYGEN SATURATION: 98 % | TEMPERATURE: 98 F | RESPIRATION RATE: 18 BRPM | WEIGHT: 222.5 LBS | SYSTOLIC BLOOD PRESSURE: 152 MMHG

## 2021-11-23 DIAGNOSIS — I10 ESSENTIAL HYPERTENSION, BENIGN: ICD-10-CM

## 2021-11-23 DIAGNOSIS — D69.1 PLATELET DYSFUNCTION (HCC): ICD-10-CM

## 2021-11-23 DIAGNOSIS — E11.9 DIET-CONTROLLED DIABETES MELLITUS (HCC): Primary | ICD-10-CM

## 2021-11-23 DIAGNOSIS — E11.9 DIET-CONTROLLED DIABETES MELLITUS (HCC): ICD-10-CM

## 2021-11-23 DIAGNOSIS — E78.00 PURE HYPERCHOLESTEROLEMIA: ICD-10-CM

## 2021-11-23 PROBLEM — E66.01 MORBID (SEVERE) OBESITY DUE TO EXCESS CALORIES (HCC): Status: ACTIVE | Noted: 2021-11-23

## 2021-11-23 PROBLEM — G40.89 OTHER SEIZURES (HCC): Status: ACTIVE | Noted: 2021-11-23

## 2021-11-23 PROCEDURE — 83036 HEMOGLOBIN GLYCOSYLATED A1C: CPT

## 2021-11-23 PROCEDURE — 80053 COMPREHEN METABOLIC PANEL: CPT

## 2021-11-23 PROCEDURE — 85025 COMPLETE CBC W/AUTO DIFF WBC: CPT

## 2021-11-23 PROCEDURE — 80061 LIPID PANEL: CPT

## 2021-11-23 PROCEDURE — 99214 OFFICE O/P EST MOD 30 MIN: CPT | Performed by: INTERNAL MEDICINE

## 2021-11-23 PROCEDURE — 36415 COLL VENOUS BLD VENIPUNCTURE: CPT

## 2021-11-23 RX ORDER — CIPROFLOXACIN 500 MG/1
500 TABLET, FILM COATED ORAL 2 TIMES DAILY
Qty: 20 TABLET | Refills: 0 | Status: SHIPPED | OUTPATIENT
Start: 2021-11-23 | End: 2021-12-03

## 2021-11-23 RX ORDER — METOPROLOL SUCCINATE 50 MG/1
50 TABLET, EXTENDED RELEASE ORAL DAILY
Qty: 90 TABLET | Refills: 0 | Status: SHIPPED | OUTPATIENT
Start: 2021-11-23

## 2021-11-23 RX ORDER — MOMETASONE 50 UG/1
2 SPRAY, METERED NASAL DAILY
Qty: 17 G | Refills: 0 | Status: SHIPPED | OUTPATIENT
Start: 2021-11-23

## 2021-11-23 NOTE — PROGRESS NOTES
HPI:   Harper Branch is a 61year old female who presents for upper respiratory symptoms for  7  days. Patient reports sore throat, congestion, dry cough.     Current Outpatient Medications   Medication Sig Dispense Refill   • albuterol 108 (90 Base) [facility] • Essential hypertension, benign 3/11/2008   • High blood pressure    • History of blood transfusion     platelets - 5 in the last 9 months   • MS (multiple sclerosis) (HCC)     diagnosed 30 years ago   • Obesity, unspecified 5/27/2008   • Osteoarthrosis OF SYSTEMS:   GENERAL: feels well otherwise  SKIN: no rashes  EYES:denies blurred vision or double vision  HEENT: congested  LUNGS: denies shortness of breath with exertion  CARDIOVASCULAR: denies chest pain on exertion  GI: no nausea or abdominal pain  NE

## 2021-11-23 NOTE — PROGRESS NOTES
HPI:   Harish Sandhu is a 61year old female who presents for recheck of her diabetes. Patient’s FBS have been 120-130. Last visit with ophthalmologist was last year. Pt has been checking her feet on a regular basis.  Pt denies any tingling of the feet tablet (50 mg total) by mouth daily. Patient is taking 1/2 tab daily 90 tablet 0   • LEVETIRACETAM 750 MG Oral Tab TAKE 1 TABLET(750 MG) BY MOUTH TWICE DAILY 180 tablet 0   • lisinopril 40 MG Oral Tab Take 1 tablet (40 mg total) by mouth daily.  PATIENT NELLY 3/23/2007   • Unspecified essential hypertension    • Unspecified hypothyroidism 10/10/2007   • Urge incontinence    • Uterovaginal prolapse, incomplete 10/9/2007   • Visual impairment     glasses      Past Surgical History:   Procedure Laterality Date   • feet is normal.  Pulsation pedal pulse exam of both lower legs/feet is normal as well. NEURO: sensation is intact to monofilament     ASSESSMENT AND PLAN:   Jordan Ribera is a 61year old female who presents for a recheck of her diabetes.  Diabetic con

## 2021-12-17 RX ORDER — CLOTRIMAZOLE AND BETAMETHASONE DIPROPIONATE 10; .64 MG/G; MG/G
CREAM TOPICAL
Qty: 45 G | Refills: 0 | Status: SHIPPED | OUTPATIENT
Start: 2021-12-17 | End: 2021-12-27 | Stop reason: ALTCHOICE

## 2021-12-17 NOTE — TELEPHONE ENCOUNTER
PT HAS YEAST INFECTION UNDER BREASTS, HAS HAD THIS IN THE PAST, CAN  SEND MEDS OVER TO [facility] THAT SHE USUALLY PRESCRIBES FOR THIS?  THIS CAN WAIT TILL SATURDAY 12/18 WHEN  IS BACK IN OFFICE

## 2021-12-17 NOTE — TELEPHONE ENCOUNTER
Patient states that she has a yeast infection under her breasts that she has had in the past.  Patient is asking for a refill on the topical cream she has had in the past for this. Would this be lotrisone?   Patient is unsure of the medication that was pre

## 2021-12-27 ENCOUNTER — OFFICE VISIT (OUTPATIENT)
Dept: FAMILY MEDICINE CLINIC | Facility: CLINIC | Age: 66
End: 2021-12-27
Payer: MEDICARE

## 2021-12-27 VITALS
DIASTOLIC BLOOD PRESSURE: 76 MMHG | OXYGEN SATURATION: 98 % | RESPIRATION RATE: 18 BRPM | WEIGHT: 213.25 LBS | SYSTOLIC BLOOD PRESSURE: 134 MMHG | TEMPERATURE: 98 F | BODY MASS INDEX: 39 KG/M2 | HEART RATE: 76 BPM

## 2021-12-27 DIAGNOSIS — D69.6 THROMBOCYTOPENIA (HCC): ICD-10-CM

## 2021-12-27 DIAGNOSIS — I16.0 HYPERTENSIVE URGENCY: ICD-10-CM

## 2021-12-27 DIAGNOSIS — Z87.19 H/O SMALL BOWEL OBSTRUCTION: Primary | ICD-10-CM

## 2021-12-27 PROCEDURE — 99214 OFFICE O/P EST MOD 30 MIN: CPT | Performed by: INTERNAL MEDICINE

## 2021-12-27 RX ORDER — AMLODIPINE BESYLATE 10 MG/1
TABLET ORAL
COMMUNITY
Start: 2021-12-22 | End: 2021-12-27

## 2021-12-27 RX ORDER — AMLODIPINE BESYLATE 10 MG/1
10 TABLET ORAL DAILY
COMMUNITY
Start: 2021-12-23 | End: 2021-12-27

## 2021-12-27 RX ORDER — ACETAMINOPHEN 325 MG/1
2 TABLET ORAL EVERY 6 HOURS PRN
COMMUNITY
Start: 2021-12-21

## 2021-12-27 NOTE — PROGRESS NOTES
Nilda Stearns is a 61year old female. HPI:   Pt was admitted to [facility] for SBO 12/17/21 after acute onset abd pain at home. She has a platelet disorder and received transfusion and IV TXA before laparoscopic lysis of adhesions by Dr Pierre Person.   She did f Inhaler 6   • saccharomyces boulardii (FLORASTOR) 250 MG Oral Cap Take 1 capsule (250 mg total) by mouth 2 (two) times daily. 60 capsule 11   • mometasone furoate 50 MCG/ACT Nasal Suspension 2 sprays by Nasal route daily.  (Patient not taking: Reported on 1 SpO2 98%   BMI 39.00 kg/m²   GENERAL: well developed, well nourished,in no apparent distress  SKIN: no rashes,no suspicious lesions  HEENT: atraumatic, normocephalic,ears and throat are clear  NECK: supple,no adenopathy,no bruits  LUNGS: clear to auscultat

## 2021-12-28 ENCOUNTER — PATIENT MESSAGE (OUTPATIENT)
Dept: FAMILY MEDICINE CLINIC | Facility: CLINIC | Age: 66
End: 2021-12-28

## 2021-12-28 PROBLEM — Z87.19 H/O SMALL BOWEL OBSTRUCTION: Status: ACTIVE | Noted: 2021-12-28

## 2021-12-28 RX ORDER — ALPRAZOLAM 0.25 MG/1
0.25 TABLET ORAL NIGHTLY PRN
Qty: 30 TABLET | Refills: 3 | Status: SHIPPED | OUTPATIENT
Start: 2021-12-28

## 2021-12-28 RX ORDER — AMLODIPINE BESYLATE 10 MG/1
10 TABLET ORAL DAILY
Qty: 90 TABLET | Refills: 0 | Status: SHIPPED | OUTPATIENT
Start: 2021-12-28 | End: 2022-03-28

## 2021-12-28 NOTE — TELEPHONE ENCOUNTER
From: Isaias Bee  To:  Pal Arevalo MD  Sent: 12/28/2021 12:24 PM CST  Subject: Here are my B/P     12/24/21 pulse 76) 176/99 @9:20 am before morning meds, same day @ 6:16 pm pulse 78) 143/92 12/25/21 pulse 65) 140/82 @ 9:49am, same day pulse 65) 13

## 2022-01-05 RX ORDER — MONTELUKAST SODIUM 10 MG/1
TABLET ORAL
Qty: 270 TABLET | Refills: 0 | Status: SHIPPED | OUTPATIENT
Start: 2022-01-05

## 2022-01-05 RX ORDER — LEVETIRACETAM 750 MG/1
TABLET ORAL
Qty: 180 TABLET | Refills: 0 | Status: SHIPPED | OUTPATIENT
Start: 2022-01-05

## 2022-01-05 NOTE — TELEPHONE ENCOUNTER
Asthma & COPD Medication Protocol Failed 01/04/2022 05:23 PM    Asthma Action Score greater than or equal to 20    AAP/ACT given in last 12 months    Appointment in past 6 or next 3 months      Last refill - 4/26/21 -  #270  Last office visit - 12/27/21

## 2022-01-07 ENCOUNTER — TELEPHONE (OUTPATIENT)
Dept: FAMILY MEDICINE CLINIC | Facility: CLINIC | Age: 67
End: 2022-01-07

## 2022-01-07 NOTE — TELEPHONE ENCOUNTER
Patient states she has a pinching pain under her left breast only on certain times when bending and coughing. She had a laparoscopic procedure so does not sound like it is related to the pinching.  She also stated she had one time at the throat she felt a p

## 2022-01-07 NOTE — TELEPHONE ENCOUNTER
Pt had obstruction of intestine surgery about 3 weeks ago,  She said the last 4 or 5 days she has had pain. She coughed today & she had a sharp pain in area of her esophagus.   She refused to call her surgeon

## 2022-01-10 ENCOUNTER — OFFICE VISIT (OUTPATIENT)
Dept: FAMILY MEDICINE CLINIC | Facility: CLINIC | Age: 67
End: 2022-01-10
Payer: MEDICARE

## 2022-01-10 VITALS
RESPIRATION RATE: 18 BRPM | DIASTOLIC BLOOD PRESSURE: 84 MMHG | HEART RATE: 74 BPM | OXYGEN SATURATION: 97 % | SYSTOLIC BLOOD PRESSURE: 138 MMHG | TEMPERATURE: 98 F

## 2022-01-10 DIAGNOSIS — R10.12 LEFT UPPER QUADRANT ABDOMINAL PAIN: Primary | ICD-10-CM

## 2022-01-10 PROCEDURE — 99214 OFFICE O/P EST MOD 30 MIN: CPT | Performed by: INTERNAL MEDICINE

## 2022-01-11 NOTE — PROGRESS NOTES
Mag Hyde is a 61year old female. HPI:   Pt has been feeling a little pinching painin the LUQ for a week. It is sharp for a second and then dull and fades out in about 5 minutes.   Sometimes it can be induced by bending over or lifting up, like wh daily. 60 capsule 11   • mometasone furoate 50 MCG/ACT Nasal Suspension 2 sprays by Nasal route daily.  (Patient not taking: Reported on 12/27/2021) 17 g 0      Past Medical History:   Diagnosis Date   • Allergic rhinitis, cause unspecified 6/27/2007   • Bl lesions  HEENT: atraumatic, normocephalic,ears and throat are clear  NECK: supple,no adenopathy,no bruits  LUNGS: clear to auscultation  CARDIO: RRR without murmur  GI: good BS's,no masses, HSM or tenderness  EXTREMITIES: no cyanosis, clubbing or edema

## 2022-01-17 RX ORDER — AMLODIPINE BESYLATE 10 MG/1
TABLET ORAL
Qty: 90 TABLET | Refills: 0 | OUTPATIENT
Start: 2022-01-17

## 2022-01-17 NOTE — TELEPHONE ENCOUNTER
LOV    LAST LAB    LAST RX     Next OV No future appointments.   PROTOCOL  Hypertension Medications Protocol Passed 01/17/2022 09:27 AM   Protocol Details  CMP or BMP in past 12 months    Last serum creatinine< 2.0    Appointment in past 6 or next 3 months

## 2022-01-18 DIAGNOSIS — I10 ESSENTIAL HYPERTENSION, BENIGN: ICD-10-CM

## 2022-01-18 RX ORDER — LISINOPRIL 40 MG/1
TABLET ORAL
Qty: 90 TABLET | Refills: 0 | Status: SHIPPED | OUTPATIENT
Start: 2022-01-18

## 2022-01-18 NOTE — TELEPHONE ENCOUNTER
LOV 01/10/2022    LAST LAB 11/23/2021    LAST RX  Lisinopril #90 R0 08/11/2021    Next OV    PROTOCOL   Hypertension Medications Protocol Passed 01/18/2022 01:38 PM   Protocol Details  CMP or BMP in past 12 months    Last serum creatinine< 2.0    Appointme

## 2022-02-23 RX ORDER — METOPROLOL SUCCINATE 50 MG/1
TABLET, EXTENDED RELEASE ORAL
Qty: 90 TABLET | Refills: 0 | Status: SHIPPED | OUTPATIENT
Start: 2022-02-23

## 2022-03-08 ENCOUNTER — TELEMEDICINE (OUTPATIENT)
Dept: FAMILY MEDICINE CLINIC | Facility: CLINIC | Age: 67
End: 2022-03-08

## 2022-03-08 DIAGNOSIS — J05.0 CROUP: ICD-10-CM

## 2022-03-08 PROCEDURE — 99214 OFFICE O/P EST MOD 30 MIN: CPT | Performed by: INTERNAL MEDICINE

## 2022-03-08 RX ORDER — PREDNISONE 20 MG/1
40 TABLET ORAL DAILY
Qty: 14 TABLET | Refills: 0 | Status: SHIPPED | OUTPATIENT
Start: 2022-03-08 | End: 2022-03-15

## 2022-03-08 RX ORDER — BUDESONIDE AND FORMOTEROL FUMARATE DIHYDRATE 80; 4.5 UG/1; UG/1
2 AEROSOL RESPIRATORY (INHALATION) 2 TIMES DAILY
Qty: 1 EACH | Refills: 1 | Status: SHIPPED | OUTPATIENT
Start: 2022-03-08 | End: 2022-04-07

## 2022-03-08 RX ORDER — ALBUTEROL SULFATE 90 UG/1
2 AEROSOL, METERED RESPIRATORY (INHALATION) EVERY 4 HOURS PRN
Qty: 1 EACH | Refills: 1 | Status: SHIPPED | OUTPATIENT
Start: 2022-03-08 | End: 2022-04-07

## 2022-03-12 ENCOUNTER — TELEPHONE (OUTPATIENT)
Dept: FAMILY MEDICINE CLINIC | Facility: CLINIC | Age: 67
End: 2022-03-12

## 2022-03-12 RX ORDER — ALBUTEROL SULFATE 90 UG/1
2 AEROSOL, METERED RESPIRATORY (INHALATION) EVERY 4 HOURS PRN
Qty: 1 EACH | Refills: 1 | Status: SHIPPED | OUTPATIENT
Start: 2022-03-12

## 2022-04-04 RX ORDER — LEVETIRACETAM 750 MG/1
750 TABLET ORAL 2 TIMES DAILY
Qty: 180 TABLET | Refills: 3 | Status: SHIPPED | OUTPATIENT
Start: 2022-04-04

## 2022-04-04 RX ORDER — AMLODIPINE BESYLATE 10 MG/1
10 TABLET ORAL DAILY
Qty: 90 TABLET | Refills: 3 | Status: SHIPPED | OUTPATIENT
Start: 2022-04-04 | End: 2022-07-03

## 2022-04-16 RX ORDER — LISINOPRIL 40 MG/1
TABLET ORAL
Qty: 90 TABLET | Refills: 0 | Status: SHIPPED | OUTPATIENT
Start: 2022-04-16

## 2022-04-16 NOTE — TELEPHONE ENCOUNTER
LOV 01/10/2022    LAST LAB 11/23/2021    LAST RX  Lisinopril #90 R0 01/18/2022    Next OV   Future Appointments   Date Time Provider [place]   5/18/2022  9:00 AM Coni Donovan MD [facility] [place]     PROTOCOL   Hypertension Medications Protocol Passed 04/15/2022 05:31 PM   Protocol Details  CMP or BMP in past 12 months    Last serum creatinine< 2.0    Appointment in past 6 or next 3 months

## 2022-05-21 RX ORDER — METOPROLOL SUCCINATE 50 MG/1
TABLET, EXTENDED RELEASE ORAL
Qty: 90 TABLET | Refills: 0 | Status: SHIPPED | OUTPATIENT
Start: 2022-05-21

## 2022-05-24 ENCOUNTER — OFFICE VISIT (OUTPATIENT)
Dept: FAMILY MEDICINE CLINIC | Facility: CLINIC | Age: 67
End: 2022-05-24
Payer: MEDICARE

## 2022-05-24 ENCOUNTER — LABORATORY ENCOUNTER (OUTPATIENT)
Dept: LAB | Age: 67
End: 2022-05-24
Attending: INTERNAL MEDICINE
Payer: MEDICARE

## 2022-05-24 VITALS
RESPIRATION RATE: 18 BRPM | BODY MASS INDEX: 42.72 KG/M2 | TEMPERATURE: 98 F | DIASTOLIC BLOOD PRESSURE: 80 MMHG | OXYGEN SATURATION: 100 % | HEIGHT: 62 IN | SYSTOLIC BLOOD PRESSURE: 138 MMHG | HEART RATE: 79 BPM | WEIGHT: 232.13 LBS

## 2022-05-24 DIAGNOSIS — R90.82 WHITE MATTER ABNORMALITY ON MRI OF BRAIN: ICD-10-CM

## 2022-05-24 DIAGNOSIS — E11.9 TYPE 2 DIABETES MELLITUS WITHOUT COMPLICATION, WITHOUT LONG-TERM CURRENT USE OF INSULIN (HCC): ICD-10-CM

## 2022-05-24 DIAGNOSIS — Z12.4 ENCOUNTER FOR SCREENING FOR CERVICAL CANCER: ICD-10-CM

## 2022-05-24 DIAGNOSIS — Z00.00 ENCOUNTER FOR ANNUAL HEALTH EXAMINATION: ICD-10-CM

## 2022-05-24 DIAGNOSIS — I10 ESSENTIAL HYPERTENSION, BENIGN: Primary | ICD-10-CM

## 2022-05-24 DIAGNOSIS — Z13.39 SCREENING FOR ALCOHOL PROBLEM: ICD-10-CM

## 2022-05-24 DIAGNOSIS — E66.09 CLASS 2 OBESITY DUE TO EXCESS CALORIES WITHOUT SERIOUS COMORBIDITY WITH BODY MASS INDEX (BMI) OF 36.0 TO 36.9 IN ADULT: ICD-10-CM

## 2022-05-24 DIAGNOSIS — D69.6 THROMBOCYTOPENIA (HCC): ICD-10-CM

## 2022-05-24 DIAGNOSIS — Z12.31 VISIT FOR SCREENING MAMMOGRAM: ICD-10-CM

## 2022-05-24 DIAGNOSIS — E66.01 MORBID (SEVERE) OBESITY DUE TO EXCESS CALORIES (HCC): ICD-10-CM

## 2022-05-24 DIAGNOSIS — D69.1 PLATELET DYSFUNCTION (HCC): ICD-10-CM

## 2022-05-24 DIAGNOSIS — G40.802 OTHER EPILEPSY WITHOUT STATUS EPILEPTICUS, NOT INTRACTABLE (HCC): ICD-10-CM

## 2022-05-24 DIAGNOSIS — I10 ESSENTIAL HYPERTENSION, BENIGN: ICD-10-CM

## 2022-05-24 LAB
ALBUMIN SERPL-MCNC: 3.9 G/DL (ref 3.4–5)
ALBUMIN/GLOB SERPL: 1.1 {RATIO} (ref 1–2)
ALP LIVER SERPL-CCNC: 81 U/L
ALT SERPL-CCNC: 16 U/L
ANION GAP SERPL CALC-SCNC: 4 MMOL/L (ref 0–18)
AST SERPL-CCNC: 11 U/L (ref 15–37)
BASOPHILS # BLD AUTO: 0.1 X10(3) UL (ref 0–0.2)
BASOPHILS NFR BLD AUTO: 1.2 %
BILIRUB SERPL-MCNC: 0.6 MG/DL (ref 0.1–2)
BUN BLD-MCNC: 9 MG/DL (ref 7–18)
CALCIUM BLD-MCNC: 9.6 MG/DL (ref 8.5–10.1)
CHLORIDE SERPL-SCNC: 105 MMOL/L (ref 98–112)
CHOLEST SERPL-MCNC: 215 MG/DL (ref ?–200)
CO2 SERPL-SCNC: 27 MMOL/L (ref 21–32)
CREAT BLD-MCNC: 0.84 MG/DL
EOSINOPHIL # BLD AUTO: 0.13 X10(3) UL (ref 0–0.7)
EOSINOPHIL NFR BLD AUTO: 1.6 %
ERYTHROCYTE [DISTWIDTH] IN BLOOD BY AUTOMATED COUNT: 13.1 %
EST. AVERAGE GLUCOSE BLD GHB EST-MCNC: 137 MG/DL (ref 68–126)
FASTING PATIENT LIPID ANSWER: YES
FASTING STATUS PATIENT QL REPORTED: YES
GLOBULIN PLAS-MCNC: 3.7 G/DL (ref 2.8–4.4)
GLUCOSE BLD-MCNC: 151 MG/DL (ref 70–99)
HBA1C MFR BLD: 6.4 % (ref ?–5.7)
HCT VFR BLD AUTO: 39.5 %
HDLC SERPL-MCNC: 34 MG/DL (ref 40–59)
HGB BLD-MCNC: 12.8 G/DL
IMM GRANULOCYTES # BLD AUTO: 0.05 X10(3) UL (ref 0–1)
IMM GRANULOCYTES NFR BLD: 0.6 %
LDLC SERPL CALC-MCNC: 115 MG/DL (ref ?–100)
LYMPHOCYTES # BLD AUTO: 1.46 X10(3) UL (ref 1–4)
LYMPHOCYTES NFR BLD AUTO: 17.6 %
MCH RBC QN AUTO: 30 PG (ref 26–34)
MCHC RBC AUTO-ENTMCNC: 32.4 G/DL (ref 31–37)
MCV RBC AUTO: 92.7 FL
MONOCYTES # BLD AUTO: 0.46 X10(3) UL (ref 0.1–1)
MONOCYTES NFR BLD AUTO: 5.5 %
NEUTROPHILS # BLD AUTO: 6.1 X10 (3) UL (ref 1.5–7.7)
NEUTROPHILS # BLD AUTO: 6.1 X10(3) UL (ref 1.5–7.7)
NEUTROPHILS NFR BLD AUTO: 73.5 %
NONHDLC SERPL-MCNC: 181 MG/DL (ref ?–130)
OSMOLALITY SERPL CALC.SUM OF ELEC: 284 MOSM/KG (ref 275–295)
PLATELET # BLD AUTO: 164 10(3)UL (ref 150–450)
POTASSIUM SERPL-SCNC: 4.3 MMOL/L (ref 3.5–5.1)
PROT SERPL-MCNC: 7.6 G/DL (ref 6.4–8.2)
RBC # BLD AUTO: 4.26 X10(6)UL
SODIUM SERPL-SCNC: 136 MMOL/L (ref 136–145)
TRIGL SERPL-MCNC: 381 MG/DL (ref 30–149)
VLDLC SERPL CALC-MCNC: 68 MG/DL (ref 0–30)
WBC # BLD AUTO: 8.3 X10(3) UL (ref 4–11)

## 2022-05-24 PROCEDURE — 88175 CYTOPATH C/V AUTO FLUID REDO: CPT | Performed by: INTERNAL MEDICINE

## 2022-05-24 PROCEDURE — G0442 ANNUAL ALCOHOL SCREEN 15 MIN: HCPCS | Performed by: INTERNAL MEDICINE

## 2022-05-24 PROCEDURE — 80053 COMPREHEN METABOLIC PANEL: CPT

## 2022-05-24 PROCEDURE — G0439 PPPS, SUBSEQ VISIT: HCPCS | Performed by: INTERNAL MEDICINE

## 2022-05-24 PROCEDURE — 83036 HEMOGLOBIN GLYCOSYLATED A1C: CPT

## 2022-05-24 PROCEDURE — 80061 LIPID PANEL: CPT

## 2022-05-24 PROCEDURE — 85025 COMPLETE CBC W/AUTO DIFF WBC: CPT

## 2022-05-24 PROCEDURE — 36415 COLL VENOUS BLD VENIPUNCTURE: CPT

## 2022-07-14 DIAGNOSIS — I10 ESSENTIAL HYPERTENSION, BENIGN: ICD-10-CM

## 2022-07-18 RX ORDER — LISINOPRIL 40 MG/1
40 TABLET ORAL DAILY
Qty: 90 TABLET | Refills: 1 | Status: SHIPPED | OUTPATIENT
Start: 2022-07-18

## 2022-07-18 NOTE — TELEPHONE ENCOUNTER
Hypertension Medications Protocol Passed 07/14/2022 05:26 PM   Protocol Details  CMP or BMP in past 12 months    Last serum creatinine< 2.0    Appointment in past 6 or next 3 months     Last refill - 4/16/22 - #90   Last CMP - 4/24/22 - creatinine - 0.84  Last office visit - 5/24/22   Refilled per protocol

## 2022-08-01 RX ORDER — ALPRAZOLAM 0.25 MG/1
0.25 TABLET ORAL NIGHTLY PRN
Qty: 30 TABLET | Refills: 3 | Status: SHIPPED | OUTPATIENT
Start: 2022-08-01

## 2022-08-01 NOTE — TELEPHONE ENCOUNTER
PT NEEDS REFILL ON-    PER PT STILL NOT AT [facility]    ALPRAZolam 0.25 MG Oral Tab    [facility] #[id number] - [place], [place] - [address] [place] [address] AT [address] [place] [address] ([address]), [phone], [address]

## 2022-08-01 NOTE — TELEPHONE ENCOUNTER
Last OV w/Dr Rosy Capellan 5/24/22  Last refill 12/28/21 #30 w/3 refills  No future appointments scheduled.

## 2022-08-22 RX ORDER — METOPROLOL SUCCINATE 50 MG/1
50 TABLET, EXTENDED RELEASE ORAL DAILY
Qty: 90 TABLET | Refills: 0 | Status: SHIPPED | OUTPATIENT
Start: 2022-08-22

## 2022-08-22 NOTE — TELEPHONE ENCOUNTER
Last refill: 5/21/22 #90 w/ 0 refills    Last OV: 5/24/22  Last labs: 5/24/22    Future Appointments   Date Time Provider [place]   9/26/2022 10:20 AM Zina Arevalo DO [facility]

## 2022-08-29 ENCOUNTER — TELEPHONE (OUTPATIENT)
Dept: FAMILY MEDICINE CLINIC | Facility: CLINIC | Age: 67
End: 2022-08-29

## 2022-08-29 RX ORDER — NYSTATIN 100000 [USP'U]/G
1 POWDER TOPICAL 2 TIMES DAILY
Qty: 60 G | Refills: 3 | Status: SHIPPED | OUTPATIENT
Start: 2022-08-29 | End: 2022-09-08

## 2022-08-29 NOTE — TELEPHONE ENCOUNTER
She said she has not been wearing a bra and has a rash under her breasts. She said she has had a yeast infections in the past and is sure this is what she has. She said that her neurologist does not want to change her anti-seizure medicine but she increased her Xanax to 0.5mg so she has been taking 2 of the 0.25mg. She said she usually takes it once daily.

## 2022-08-29 NOTE — TELEPHONE ENCOUNTER
Pt has yeast infection under left breast-  Could something be called in for her?     [facility] [address] [place] [address] [place] [address] ([address]), [phone], [phone]    Thank you

## 2022-08-29 NOTE — TELEPHONE ENCOUNTER
Nystatin powder ordered, can use corn starch powder as well. Wear something to keep the skin from touching if you can. Is Pelon home?

## 2022-08-29 NOTE — TELEPHONE ENCOUNTER
----- Message from Junior Trujillo sent at 8/29/2022  3:53 PM CDT -----  [facility] NEVER RECEIVED MEDS FOR HER YEAST INFECTION UNDER BREASTS, PLEASE CALL IT OVER TODAY SO HER DAUGHTER CAN GO PICK IT UP FOR HER

## 2022-09-26 ENCOUNTER — TELEPHONE (OUTPATIENT)
Dept: FAMILY MEDICINE CLINIC | Facility: CLINIC | Age: 67
End: 2022-09-26

## 2022-09-26 RX ORDER — ALPRAZOLAM 0.5 MG/1
0.5 TABLET ORAL 2 TIMES DAILY PRN
Qty: 60 TABLET | Refills: 2 | Status: SHIPPED | OUTPATIENT
Start: 2022-09-26 | End: 2022-10-26

## 2022-09-26 NOTE — TELEPHONE ENCOUNTER
Just clarify I see she filled a script for clonazepam 8/9/22 and alprazolam. She should just be on one benzodiazapine. I will fill the xanax for 60, but no clonazepam. How's Pelon? Is there anything we can do to help her?

## 2022-09-26 NOTE — TELEPHONE ENCOUNTER
Patient said that she is not taking the Clonazepam, Dr Gibson Listen prescribed it for her to have \"on hand\" in the event that she has a seizure. Maddie at [place] [facility] advised to put Dr Ernestina Linder medication on hold and fill the script from Dr Orlando Lamas for #60 BID.  V.O. Dr Olga Merlin RN

## 2022-09-26 NOTE — TELEPHONE ENCOUNTER
DR PORTER INCREASED HER XANAX FROM 0.25 TO 0.5 & PRESCRIBED HER QTY 30 WITH 3 REFILLS & THIS MAY HAVE ALREADY BEEN SENT TO [facility], BUT PT WANTS QTY 60 & ALREADY SPOKE WITH [facility] & THEY WILL ALLOW IT IF DR Parisa Morris CAN CALL [facility] & CHANGE IT, PT ONLY HAS 2 PILLS LEFT

## 2022-10-06 ENCOUNTER — TELEPHONE (OUTPATIENT)
Dept: FAMILY MEDICINE CLINIC | Facility: CLINIC | Age: 67
End: 2022-10-06

## 2022-10-06 NOTE — TELEPHONE ENCOUNTER
Pt recently saw dr & was advised to stop in on Monday. She wanted to know if she was to just drop by or if appt was needed.

## 2022-10-10 ENCOUNTER — PATIENT MESSAGE (OUTPATIENT)
Dept: FAMILY MEDICINE CLINIC | Facility: CLINIC | Age: 67
End: 2022-10-10

## 2022-10-17 ENCOUNTER — PATIENT MESSAGE (OUTPATIENT)
Dept: FAMILY MEDICINE CLINIC | Facility: CLINIC | Age: 67
End: 2022-10-17

## 2022-10-17 NOTE — TELEPHONE ENCOUNTER
From: Chen Rasmussen  To: Jojo Ruiz MD  Sent: 10/17/2022 6:24 AM CDT  Subject: Jordan Fowler I have some paperwork for tax exemption papers for disability to fill out.  May I drop them at your office today and pick them up tomorrow at 10:00, my Dad has an follow up ER appointment with Dr. Ilan Martinez

## 2022-10-18 ENCOUNTER — TELEPHONE (OUTPATIENT)
Dept: FAMILY MEDICINE CLINIC | Facility: CLINIC | Age: 67
End: 2022-10-18

## 2022-10-25 ENCOUNTER — TELEPHONE (OUTPATIENT)
Dept: FAMILY MEDICINE CLINIC | Facility: CLINIC | Age: 67
End: 2022-10-25

## 2022-11-17 ENCOUNTER — TELEPHONE (OUTPATIENT)
Dept: FAMILY MEDICINE CLINIC | Facility: CLINIC | Age: 67
End: 2022-11-17

## 2022-11-17 DIAGNOSIS — I10 ESSENTIAL HYPERTENSION, BENIGN: Primary | ICD-10-CM

## 2022-11-17 DIAGNOSIS — E11.9 TYPE 2 DIABETES MELLITUS WITHOUT COMPLICATION, WITHOUT LONG-TERM CURRENT USE OF INSULIN (HCC): ICD-10-CM

## 2022-11-17 DIAGNOSIS — D69.6 THROMBOCYTOPENIA (HCC): ICD-10-CM

## 2022-11-18 RX ORDER — METOPROLOL SUCCINATE 50 MG/1
TABLET, EXTENDED RELEASE ORAL
Qty: 90 TABLET | Refills: 0 | Status: SHIPPED | OUTPATIENT
Start: 2022-11-18

## 2022-11-18 RX ORDER — MONTELUKAST SODIUM 10 MG/1
10 TABLET ORAL DAILY
Qty: 90 TABLET | Refills: 0 | Status: SHIPPED | OUTPATIENT
Start: 2022-11-18

## 2022-12-20 ENCOUNTER — PATIENT MESSAGE (OUTPATIENT)
Dept: FAMILY MEDICINE CLINIC | Facility: CLINIC | Age: 67
End: 2022-12-20

## 2022-12-21 RX ORDER — ALPRAZOLAM 0.25 MG/1
0.25 TABLET ORAL EVERY 4 HOURS PRN
Qty: 30 TABLET | Refills: 0 | Status: SHIPPED | OUTPATIENT
Start: 2022-12-21 | End: 2023-01-20

## 2022-12-21 RX ORDER — LEVETIRACETAM 750 MG/1
750 TABLET ORAL 2 TIMES DAILY
Qty: 180 TABLET | Refills: 3 | Status: SHIPPED | OUTPATIENT
Start: 2022-12-21

## 2022-12-27 ENCOUNTER — LABORATORY ENCOUNTER (OUTPATIENT)
Dept: LAB | Age: 67
End: 2022-12-27
Attending: INTERNAL MEDICINE
Payer: MEDICARE

## 2022-12-27 DIAGNOSIS — I10 ESSENTIAL HYPERTENSION, BENIGN: ICD-10-CM

## 2022-12-27 DIAGNOSIS — D69.6 THROMBOCYTOPENIA (HCC): ICD-10-CM

## 2022-12-27 DIAGNOSIS — E11.9 TYPE 2 DIABETES MELLITUS WITHOUT COMPLICATION, WITHOUT LONG-TERM CURRENT USE OF INSULIN (HCC): ICD-10-CM

## 2022-12-27 LAB
ALBUMIN SERPL-MCNC: 3.7 G/DL (ref 3.4–5)
ALBUMIN/GLOB SERPL: 1.1 {RATIO} (ref 1–2)
ALP LIVER SERPL-CCNC: 80 U/L
ALT SERPL-CCNC: 16 U/L
ANION GAP SERPL CALC-SCNC: 5 MMOL/L (ref 0–18)
AST SERPL-CCNC: 13 U/L (ref 15–37)
BASOPHILS # BLD AUTO: 0.09 X10(3) UL (ref 0–0.2)
BASOPHILS NFR BLD AUTO: 1.2 %
BILIRUB SERPL-MCNC: 0.5 MG/DL (ref 0.1–2)
BUN BLD-MCNC: 16 MG/DL (ref 7–18)
CALCIUM BLD-MCNC: 9 MG/DL (ref 8.5–10.1)
CHLORIDE SERPL-SCNC: 106 MMOL/L (ref 98–112)
CHOLEST SERPL-MCNC: 238 MG/DL (ref ?–200)
CO2 SERPL-SCNC: 29 MMOL/L (ref 21–32)
CREAT BLD-MCNC: 0.87 MG/DL
EOSINOPHIL # BLD AUTO: 0.17 X10(3) UL (ref 0–0.7)
EOSINOPHIL NFR BLD AUTO: 2.3 %
ERYTHROCYTE [DISTWIDTH] IN BLOOD BY AUTOMATED COUNT: 12.9 %
EST. AVERAGE GLUCOSE BLD GHB EST-MCNC: 117 MG/DL (ref 68–126)
FASTING PATIENT LIPID ANSWER: YES
FASTING STATUS PATIENT QL REPORTED: YES
GFR SERPLBLD BASED ON 1.73 SQ M-ARVRAT: 74 ML/MIN/1.73M2 (ref 60–?)
GLOBULIN PLAS-MCNC: 3.4 G/DL (ref 2.8–4.4)
GLUCOSE BLD-MCNC: 132 MG/DL (ref 70–99)
HBA1C MFR BLD: 5.7 % (ref ?–5.7)
HCT VFR BLD AUTO: 39.1 %
HDLC SERPL-MCNC: 33 MG/DL (ref 40–59)
HGB BLD-MCNC: 12.7 G/DL
IMM GRANULOCYTES # BLD AUTO: 0.05 X10(3) UL (ref 0–1)
IMM GRANULOCYTES NFR BLD: 0.7 %
LDLC SERPL CALC-MCNC: 113 MG/DL (ref ?–100)
LYMPHOCYTES # BLD AUTO: 1.81 X10(3) UL (ref 1–4)
LYMPHOCYTES NFR BLD AUTO: 24.5 %
MCH RBC QN AUTO: 30.3 PG (ref 26–34)
MCHC RBC AUTO-ENTMCNC: 32.5 G/DL (ref 31–37)
MCV RBC AUTO: 93.3 FL
MONOCYTES # BLD AUTO: 0.46 X10(3) UL (ref 0.1–1)
MONOCYTES NFR BLD AUTO: 6.2 %
NEUTROPHILS # BLD AUTO: 4.82 X10 (3) UL (ref 1.5–7.7)
NEUTROPHILS # BLD AUTO: 4.82 X10(3) UL (ref 1.5–7.7)
NEUTROPHILS NFR BLD AUTO: 65.1 %
NONHDLC SERPL-MCNC: 205 MG/DL (ref ?–130)
OSMOLALITY SERPL CALC.SUM OF ELEC: 293 MOSM/KG (ref 275–295)
PLATELET # BLD AUTO: 133 10(3)UL (ref 150–450)
POTASSIUM SERPL-SCNC: 4.4 MMOL/L (ref 3.5–5.1)
PROT SERPL-MCNC: 7.1 G/DL (ref 6.4–8.2)
RBC # BLD AUTO: 4.19 X10(6)UL
SODIUM SERPL-SCNC: 140 MMOL/L (ref 136–145)
TRIGL SERPL-MCNC: 529 MG/DL (ref 30–149)
VLDLC SERPL CALC-MCNC: 95 MG/DL (ref 0–30)
WBC # BLD AUTO: 7.4 X10(3) UL (ref 4–11)

## 2022-12-27 PROCEDURE — 85025 COMPLETE CBC W/AUTO DIFF WBC: CPT

## 2022-12-27 PROCEDURE — 36415 COLL VENOUS BLD VENIPUNCTURE: CPT

## 2022-12-27 PROCEDURE — 80053 COMPREHEN METABOLIC PANEL: CPT

## 2022-12-27 PROCEDURE — 80061 LIPID PANEL: CPT

## 2022-12-27 PROCEDURE — 83036 HEMOGLOBIN GLYCOSYLATED A1C: CPT

## 2023-01-30 DIAGNOSIS — I10 ESSENTIAL HYPERTENSION, BENIGN: ICD-10-CM

## 2023-01-31 RX ORDER — LISINOPRIL 40 MG/1
TABLET ORAL
Qty: 90 TABLET | Refills: 1 | Status: SHIPPED | OUTPATIENT
Start: 2023-01-31 | End: 2023-02-02

## 2023-02-02 ENCOUNTER — OFFICE VISIT (OUTPATIENT)
Dept: FAMILY MEDICINE CLINIC | Facility: CLINIC | Age: 68
End: 2023-02-02
Payer: MEDICARE

## 2023-02-02 VITALS
DIASTOLIC BLOOD PRESSURE: 88 MMHG | RESPIRATION RATE: 18 BRPM | SYSTOLIC BLOOD PRESSURE: 138 MMHG | TEMPERATURE: 98 F | OXYGEN SATURATION: 99 % | HEART RATE: 60 BPM

## 2023-02-02 DIAGNOSIS — D69.6 THROMBOCYTOPENIA (HCC): ICD-10-CM

## 2023-02-02 DIAGNOSIS — E11.9 TYPE 2 DIABETES MELLITUS WITHOUT COMPLICATION, WITHOUT LONG-TERM CURRENT USE OF INSULIN (HCC): Primary | ICD-10-CM

## 2023-02-02 DIAGNOSIS — E66.01 MORBID (SEVERE) OBESITY DUE TO EXCESS CALORIES (HCC): ICD-10-CM

## 2023-02-02 DIAGNOSIS — G40.802 OTHER EPILEPSY WITHOUT STATUS EPILEPTICUS, NOT INTRACTABLE (HCC): ICD-10-CM

## 2023-02-02 DIAGNOSIS — I10 ESSENTIAL HYPERTENSION, BENIGN: ICD-10-CM

## 2023-02-02 PROCEDURE — 99214 OFFICE O/P EST MOD 30 MIN: CPT | Performed by: INTERNAL MEDICINE

## 2023-02-02 RX ORDER — AMLODIPINE BESYLATE 10 MG/1
TABLET ORAL
COMMUNITY
Start: 2022-12-27 | End: 2023-02-02

## 2023-02-02 RX ORDER — ALPRAZOLAM 0.5 MG/1
TABLET ORAL
COMMUNITY
Start: 2022-11-28 | End: 2023-02-02

## 2023-02-02 RX ORDER — CLONAZEPAM 1 MG/1
TABLET, ORALLY DISINTEGRATING ORAL
Refills: 0 | Status: CANCELLED | OUTPATIENT
Start: 2023-02-02

## 2023-02-02 RX ORDER — METOPROLOL SUCCINATE 50 MG/1
50 TABLET, EXTENDED RELEASE ORAL DAILY
Qty: 90 TABLET | Refills: 1 | Status: SHIPPED | OUTPATIENT
Start: 2023-02-02

## 2023-02-02 RX ORDER — CLONAZEPAM 1 MG/1
TABLET, ORALLY DISINTEGRATING ORAL
COMMUNITY
Start: 2022-08-09 | End: 2023-02-02

## 2023-02-02 RX ORDER — LISINOPRIL 40 MG/1
40 TABLET ORAL DAILY
Qty: 90 TABLET | Refills: 1 | Status: SHIPPED | OUTPATIENT
Start: 2023-02-02

## 2023-02-02 RX ORDER — AMLODIPINE BESYLATE 10 MG/1
10 TABLET ORAL DAILY
Qty: 90 TABLET | Refills: 1 | Status: SHIPPED | OUTPATIENT
Start: 2023-02-02 | End: 2023-05-03

## 2023-02-02 RX ORDER — ALPRAZOLAM 0.25 MG/1
0.25 TABLET ORAL EVERY 6 HOURS PRN
Qty: 30 TABLET | Refills: 1 | Status: SHIPPED | OUTPATIENT
Start: 2023-02-02

## 2023-02-14 ENCOUNTER — TELEPHONE (OUTPATIENT)
Dept: FAMILY MEDICINE CLINIC | Facility: CLINIC | Age: 68
End: 2023-02-14

## 2023-02-14 RX ORDER — MONTELUKAST SODIUM 10 MG/1
TABLET ORAL
Qty: 90 TABLET | Refills: 0 | Status: SHIPPED | OUTPATIENT
Start: 2023-02-14

## 2023-02-14 NOTE — TELEPHONE ENCOUNTER
PT NEEDS A REFILL ON-     Montelukast Sodium 10 MG     [facility] #[id number] - [place], [place] - [address] [facility] AT [address] [place] [address] ([address]), [phone], [address]

## 2023-02-17 ENCOUNTER — TELEPHONE (OUTPATIENT)
Dept: FAMILY MEDICINE CLINIC | Facility: CLINIC | Age: 68
End: 2023-02-17

## 2023-02-17 DIAGNOSIS — J01.00 ACUTE NON-RECURRENT MAXILLARY SINUSITIS: Primary | ICD-10-CM

## 2023-02-17 RX ORDER — CIPROFLOXACIN 500 MG/1
500 TABLET, FILM COATED ORAL 2 TIMES DAILY
Qty: 28 TABLET | Refills: 0 | Status: SHIPPED | OUTPATIENT
Start: 2023-02-17 | End: 2023-03-03

## 2023-02-17 NOTE — TELEPHONE ENCOUNTER
Spoke with patient - has had congestion/post nasal drip since 2/12 - \"burns\" with a cough, sometimes hears wheezing when lying down - does has inhalers. Would like an antibiotic.  Covid test was negative  Last antibiotic - 11/23/2021 Cipro

## 2023-02-17 NOTE — TELEPHONE ENCOUNTER
Spoke with patient - informed of new prescription of Cipro to pharmacy  Pt verbalized understanding.

## 2023-03-20 ENCOUNTER — OFFICE VISIT (OUTPATIENT)
Dept: FAMILY MEDICINE CLINIC | Facility: CLINIC | Age: 68
End: 2023-03-20
Payer: MEDICARE

## 2023-03-20 VITALS
TEMPERATURE: 98 F | DIASTOLIC BLOOD PRESSURE: 88 MMHG | WEIGHT: 224.5 LBS | OXYGEN SATURATION: 98 % | BODY MASS INDEX: 41 KG/M2 | SYSTOLIC BLOOD PRESSURE: 138 MMHG | HEART RATE: 87 BPM | RESPIRATION RATE: 18 BRPM

## 2023-03-20 DIAGNOSIS — J98.01 BRONCHOSPASM: ICD-10-CM

## 2023-03-20 DIAGNOSIS — J01.00 SUBACUTE MAXILLARY SINUSITIS: Primary | ICD-10-CM

## 2023-03-20 PROCEDURE — 99214 OFFICE O/P EST MOD 30 MIN: CPT | Performed by: INTERNAL MEDICINE

## 2023-03-20 RX ORDER — PREDNISONE 20 MG/1
40 TABLET ORAL DAILY
Qty: 14 TABLET | Refills: 0 | Status: SHIPPED | OUTPATIENT
Start: 2023-03-20 | End: 2023-03-27

## 2023-03-20 RX ORDER — DOXYCYCLINE HYCLATE 100 MG
100 TABLET ORAL 2 TIMES DAILY
Qty: 28 TABLET | Refills: 0 | Status: SHIPPED | OUTPATIENT
Start: 2023-03-20 | End: 2023-04-03

## 2023-05-15 RX ORDER — MONTELUKAST SODIUM 10 MG/1
TABLET ORAL
Qty: 90 TABLET | Refills: 0 | Status: SHIPPED | OUTPATIENT
Start: 2023-05-15

## 2023-05-15 RX ORDER — ALPRAZOLAM 0.25 MG/1
TABLET ORAL
Qty: 30 TABLET | Refills: 0 | Status: SHIPPED | OUTPATIENT
Start: 2023-05-15

## 2023-05-15 NOTE — TELEPHONE ENCOUNTER
Alprazolam     LOV  2-2-23    LAST RX  2-2-23 #30 RF 1    Next OV    Future Appointments   Date Time Provider [place]   7/19/2023  3:05 PM SIENA, PROCEDURE [facility]IEDW None               Montelukast    LOV  2-2-23    LAST LAB  12-27-22    LAST RX  2-14-23  #90    Next OV    Future Appointments   Date Time Provider [place]   7/19/2023  3:05 PM SIENA, PROCEDURE SGIEDW None         PROTOCOL  Asthma & COPD Medication Protocol Failed 05/15/2023 11:25 AM    Asthma Action Score greater than or equal to 20    AAP/ACT given in last 12 months    Appointment in past 6 or next 3 months

## 2023-06-19 ENCOUNTER — TELEPHONE (OUTPATIENT)
Dept: FAMILY MEDICINE CLINIC | Facility: CLINIC | Age: 68
End: 2023-06-19

## 2023-06-19 NOTE — TELEPHONE ENCOUNTER
Spoke with patient - instructed that Dr. Marianela Johnston wants her to talk with hematologist.  Pt verbalized understanding.

## 2023-06-19 NOTE — TELEPHONE ENCOUNTER
Pt wanted to let us know that she is having her colonoscopy on July 19th. She has scheduled her wellness visit. Pt wants to know if she should contact her hematologist.  He usually prescribed tranexamic acid 650 mg before any procedures.

## 2023-06-27 DIAGNOSIS — I10 ESSENTIAL HYPERTENSION, BENIGN: ICD-10-CM

## 2023-06-28 RX ORDER — AMLODIPINE BESYLATE 10 MG/1
10 TABLET ORAL DAILY
Qty: 90 TABLET | Refills: 1 | Status: SHIPPED | OUTPATIENT
Start: 2023-06-28 | End: 2023-12-25

## 2023-07-07 RX ORDER — TRANEXAMIC ACID 650 MG/1
TABLET ORAL
COMMUNITY

## 2023-07-19 ENCOUNTER — HOSPITAL ENCOUNTER (OUTPATIENT)
Facility: HOSPITAL | Age: 68
Setting detail: HOSPITAL OUTPATIENT SURGERY
Discharge: HOME | End: 2023-07-19
Attending: INTERNAL MEDICINE | Admitting: INTERNAL MEDICINE
Payer: MEDICARE

## 2023-07-19 ENCOUNTER — ANESTHESIA EVENT (OUTPATIENT)
Dept: ENDOSCOPY | Facility: HOSPITAL | Age: 68
End: 2023-07-19
Payer: MEDICARE

## 2023-07-19 ENCOUNTER — ANESTHESIA (OUTPATIENT)
Dept: ENDOSCOPY | Facility: HOSPITAL | Age: 68
End: 2023-07-19
Payer: MEDICARE

## 2023-07-19 VITALS
HEART RATE: 60 BPM | RESPIRATION RATE: 16 BRPM | BODY MASS INDEX: 43.61 KG/M2 | DIASTOLIC BLOOD PRESSURE: 61 MMHG | OXYGEN SATURATION: 97 % | HEIGHT: 61 IN | SYSTOLIC BLOOD PRESSURE: 145 MMHG | WEIGHT: 231 LBS | TEMPERATURE: 98 F

## 2023-07-19 DIAGNOSIS — Z12.11 COLON CANCER SCREENING: ICD-10-CM

## 2023-07-19 PROCEDURE — 88305 TISSUE EXAM BY PATHOLOGIST: CPT | Performed by: INTERNAL MEDICINE

## 2023-07-19 PROCEDURE — 0DBN8ZX EXCISION OF SIGMOID COLON, VIA NATURAL OR ARTIFICIAL OPENING ENDOSCOPIC, DIAGNOSTIC: ICD-10-PCS | Performed by: INTERNAL MEDICINE

## 2023-07-19 PROCEDURE — 0DBK8ZX EXCISION OF ASCENDING COLON, VIA NATURAL OR ARTIFICIAL OPENING ENDOSCOPIC, DIAGNOSTIC: ICD-10-PCS | Performed by: INTERNAL MEDICINE

## 2023-07-19 RX ORDER — SODIUM CHLORIDE, SODIUM LACTATE, POTASSIUM CHLORIDE, CALCIUM CHLORIDE 600; 310; 30; 20 MG/100ML; MG/100ML; MG/100ML; MG/100ML
INJECTION, SOLUTION INTRAVENOUS CONTINUOUS
Status: DISCONTINUED | OUTPATIENT
Start: 2023-07-19 | End: 2023-07-19

## 2023-07-19 RX ORDER — LIDOCAINE HYDROCHLORIDE 10 MG/ML
INJECTION, SOLUTION EPIDURAL; INFILTRATION; INTRACAUDAL; PERINEURAL AS NEEDED
Status: DISCONTINUED | OUTPATIENT
Start: 2023-07-19 | End: 2023-07-19 | Stop reason: SURG

## 2023-07-19 RX ORDER — NALOXONE HYDROCHLORIDE 0.4 MG/ML
80 INJECTION, SOLUTION INTRAMUSCULAR; INTRAVENOUS; SUBCUTANEOUS AS NEEDED
Status: DISCONTINUED | OUTPATIENT
Start: 2023-07-19 | End: 2023-07-19

## 2023-07-19 RX ORDER — NICOTINE POLACRILEX 4 MG
30 LOZENGE BUCCAL
Status: DISCONTINUED | OUTPATIENT
Start: 2023-07-19 | End: 2023-07-19

## 2023-07-19 RX ORDER — DEXTROSE MONOHYDRATE 25 G/50ML
50 INJECTION, SOLUTION INTRAVENOUS
Status: DISCONTINUED | OUTPATIENT
Start: 2023-07-19 | End: 2023-07-19

## 2023-07-19 RX ORDER — NICOTINE POLACRILEX 4 MG
15 LOZENGE BUCCAL
Status: DISCONTINUED | OUTPATIENT
Start: 2023-07-19 | End: 2023-07-19

## 2023-07-19 RX ADMIN — SODIUM CHLORIDE, SODIUM LACTATE, POTASSIUM CHLORIDE, CALCIUM CHLORIDE: 600; 310; 30; 20 INJECTION, SOLUTION INTRAVENOUS at 15:06:00

## 2023-07-19 RX ADMIN — LIDOCAINE HYDROCHLORIDE 25 MG: 10 INJECTION, SOLUTION EPIDURAL; INFILTRATION; INTRACAUDAL; PERINEURAL at 15:09:00

## 2023-07-19 RX ADMIN — SODIUM CHLORIDE, SODIUM LACTATE, POTASSIUM CHLORIDE, CALCIUM CHLORIDE: 600; 310; 30; 20 INJECTION, SOLUTION INTRAVENOUS at 15:44:00

## 2023-07-19 NOTE — H&P
History & Physical Examination    Patient Name: Redmond Fabry  MRN: [id number]  [facility]: [id number]  YOB: 1955    Diagnosis: h/o polyps    Present Illness: as above    tranexamic acid 650 MG Oral Tab, Take by mouth. Prior to colonoscopy, Disp: , Rfl:   amLODIPine 10 MG Oral Tab, Take 1 tablet (10 mg total) by mouth daily. , Disp: 90 tablet, Rfl: 1  ALPRAZOLAM 0.25 MG Oral Tab, TAKE 1 TABLET(0.25 MG) BY MOUTH EVERY 6 HOURS AS NEEDED, Disp: 30 tablet, Rfl: 0  MONTELUKAST 10 MG Oral Tab, TAKE 1 TABLET(10 MG) BY MOUTH DAILY, Disp: 90 tablet, Rfl: 0  lisinopril 40 MG Oral Tab, Take 1 tablet (40 mg total) by mouth daily. , Disp: 90 tablet, Rfl: 1  metoprolol succinate ER 50 MG Oral Tablet 24 Hr, Take 1 tablet (50 mg total) by mouth daily. , Disp: 90 tablet, Rfl: 1  albuterol (VENTOLIN HFA) 108 (90 Base) MCG/ACT Inhalation Aero Soln, Inhale 2 puffs into the lungs every 4 (four) hours as needed for Wheezing., Disp: 1 each, Rfl: 1  acetaminophen 325 MG Oral Tab, Take 2 tablets (650 mg total) by mouth every 6 (six) hours as needed. , Disp: , Rfl:   mometasone furoate 50 MCG/ACT Nasal Suspension, 2 sprays by Nasal route daily. , Disp: 17 g, Rfl: 0  saccharomyces boulardii (FLORASTOR) 250 MG Oral Cap, Take 1 capsule (250 mg total) by mouth 2 (two) times daily. , Disp: 60 capsule, Rfl: 11  levetiracetam 750 MG Oral Tab, Take 1 tablet (750 mg total) by mouth 2 (two) times daily. , Disp: 180 tablet, Rfl: 3      lactated ringers infusion, , Intravenous, Continuous        Allergies:   Amaryl [Glimepiride]    HIVES  Niaspan [Niacin]            Comment:Bad hives, shaking, blotchy redness all over body  Penicillins                 Comment:hives  Sulfa Antibiotics           Comment:hives    Past Medical History:   Diagnosis Date    Allergic rhinitis, cause unspecified 06/27/2007    Anxiety     Back pain     Belching     Bleeding nose     Blood disorder     ITP    Blood in urine     Constipation     Decorative tattoo     Diarrhea, unspecified     Dysphagia 07/30/2011    Easy bruising     Esophageal reflux 05/08/2009    Essential hypertension, benign 03/11/2008    Flatulence/gas pain/belching     High blood pressure     High cholesterol     History of blood transfusion     2017 last tranfusion    History of depression     Irregular bowel habits     MS (multiple sclerosis) ([facility] [address]     diagnosed 30 years ago    Obesity, unspecified 05/27/2008    Osteoarthrosis, unspecified whether generalized or localized, unspecified site 12/27/2011    Other and unspecified hyperlipidemia     Pain in joint, lower leg 12/27/2011    Pain in joints     Postmenopausal atrophic vaginitis 06/27/2007    Pure hypercholesterolemia 10/10/2007    Seizure disorder ([facility] [address]     last episode Sept 14, 2015    Stress     Unspecified asthma(493.90) 03/23/2007    Unspecified essential hypertension     Unspecified hypothyroidism 10/10/2007    Urge incontinence     Uterovaginal prolapse, incomplete 10/09/2007    Visual impairment     glasses     Past Surgical History:   Procedure Laterality Date    BOWEL RESECTION      COLONOSCOPY  07/01/2016    Procedure: COLONOSCOPY WITH POLYPECTOMY/SUBMUCOSAL INJECTION/ENDOSCOPIC MUCOSAL RESECTION/BALLOON DILATION/BAND LIGATION;  Surgeon: Nate Kraft DO;  Location: [address] [place] [address] ENDOSCOPY    COLONOSCOPY WITH BIOPSY  07/01/2016    Procedure: COLONOSCOPY WITH BIOPSY;  Surgeon: Nate Kraft DO;  Location: [address] [place] [address] ENDOSCOPY    EGD  07/01/2016    Procedure: ESOPHAGOGASTRODUODENOSCOPY WITH BIOPSY;  Surgeon: Nate Kraft DO;  Location: [address] [place] [address] ENDOSCOPY    OTHER SURGICAL HISTORY  12/18/2021    laproscopic adhesion lysis     Family History   Problem Relation Age of Onset    Heart Disorder Father     Other (Other) Father         PACEMAKER TACHYBRADY SYNDROME    Colon Cancer Father         Lung cancer    Arthritis Mother     Heart Disorder Mother     Other (Other) Mother         Hypothyroidism    Colon Cancer Mother         Colon cancer    Cancer Maternal Grandmother     Cancer Maternal Grandfather     Breast Cancer Maternal Aunt 72        est     Social History    Tobacco Use      Smoking status: Never      Smokeless tobacco: Never      Tobacco comments: Non-smoker    Alcohol use: Not Currently      Comment: Maybe a couple times a year      SYSTEM Check if Review is Normal Check if Physical Exam is Normal If not normal, please explain:   HEENT [ x] [ x]    NECK & BACK [ x] [ x]    HEART [ x] [ x]    LUNGS [ x] [x ]    ABDOMEN [ ] [x ] See hpi   UROGENITAL [ x] [ ] Exam declined   EXTREMITIES [ x] [ x]    OTHER   H/o seizures and ITP     [ x ] I have discussed the risks and benefits and alternatives with the patient/family. They understand and agree to proceed with plan of care. [ x ] I have reviewed the History and Physical done within the last 30 days. Any changes noted above.     [address] [place] [address],[address],   7/19/2023  2:49 PM

## 2023-07-19 NOTE — OPERATIVE REPORT
Operative Report-Colonoscopy with snare polypectomy    Redmond Fabry 11/15/1955   [facility] [id number] MRN [id number]   Admission Date 7/19/2023 Operation Date 7/19/2023   Attending Physician Adeline Huang DO Operating Physician Aleksandra Alejandre DO     PREOPERATIVE DIAGNOSIS/INDICATION: History of polyps  POSTOPERTATIVE DIAGNOSIS: Polyps, Small hemorrhoids  PROCEDURE PERFORMED: COLONOSCOPY  INFORMED CONSENT:  Once a brief history and physical examination was performed, the risks, benefits and alternatives to the procedure were discussed with the patient and/or family and informed consent was obtained. The risks of sedation, perforation, missed lesions and need for surgery were all discussed. Patient expressed understanding of the risks and agreed to proceed. PROCEDURE DESCRIPTION:The patient was then brought to the endoscopy suite where her pulse, pulse oximetry and blood pressure were monitored. She was placed in the left lateral decubitus position and deep sedation was administered. Once adequate sedation was achieved, a rectal exam was performed which was normal. A lubricated tip of an Olympus video colonoscope was then inserted through the rectum and gently manipulated under direct visualization to the cecal pole and the terminal ileum. The quality of the preparation was good. Upon withdrawal of the colonoscope, careful visualization of the mucosa was performed. Cameron Prep Score: Right Colon 2 Transverse colon 3 Left colon 3  FINDINGS/THERAPEUTICS:  TERMINAL ILEUM: The Terminal Ileum was normal.   COLON: There was a 6 mm, sessile polyp in the ascending colon which was removed with the cold snare. There was a 5 mm, sessile polyp in the sigmoid colon which was removed with the cold snare. Two passes were made to the right colon. There was some stool coating the right colon and much irrigation and suctioning was required. RECTUM: Grade I Internal hemorrhoids.   RECOMMENDATIONS:   Post Colonoscopy/polypectomy precautions, watch for bleeding, infection, perforation, adverse drug reactions   Follow biopsies. Repeat colonoscopy in 5 years with a 1 day extended prep.   CC Report:   Kathy Vaughn,   7/19/2023  3:38 PM

## 2023-07-19 NOTE — ANESTHESIA POSTPROCEDURE EVALUATION
Tanvir Patient Status:  Hospital Outpatient Surgery   Age/Gender 74year old female MRN [id number]   Location [address] [facility] 28 Attending Nay Tucker, [facility] Day # 0 PCP Koko Martin MD       Anesthesia Post-op Note    COLONOSCOPY with cold snare polypectomy    Procedure Summary       Date: 07/19/23 Room / Location: [facility] ENDOSCOPY 02 / [facility] ENDOSCOPY    Anesthesia Start: 7524 Anesthesia Stop: 8013    Procedure: COLONOSCOPY with cold snare polypectomy Diagnosis:       Colon cancer screening      (polyps, hemorrhoids)    Surgeons: Nay Tucker DO Anesthesiologist: Nighat Edwards MD    Anesthesia Type: MAC ASA Status: 3            Anesthesia Type: MAC    Vitals Value Taken Time   /62 07/19/23 1548   Temp  07/19/23 1548   Pulse 74 07/19/23 1548   Resp 16 07/19/23 1548   SpO2 98 07/19/23 1548       Patient Location: Endoscopy    Anesthesia Type: MAC    Airway Patency: patent    Postop Pain Control: adequate    Mental Status: preanesthetic baseline    Nausea/Vomiting: none    Cardiopulmonary/Hydration status: stable euvolemic    Complications: no apparent anesthesia related complications    Postop vital signs: stable    Dental Exam: Unchanged from Preop    Patient to be discharged home when criteria met.

## 2023-07-26 ENCOUNTER — LABORATORY ENCOUNTER (OUTPATIENT)
Dept: LAB | Age: 68
End: 2023-07-26
Attending: INTERNAL MEDICINE
Payer: MEDICARE

## 2023-07-26 ENCOUNTER — OFFICE VISIT (OUTPATIENT)
Dept: FAMILY MEDICINE CLINIC | Facility: CLINIC | Age: 68
End: 2023-07-26
Payer: MEDICARE

## 2023-07-26 VITALS
WEIGHT: 230.13 LBS | BODY MASS INDEX: 43.45 KG/M2 | DIASTOLIC BLOOD PRESSURE: 78 MMHG | RESPIRATION RATE: 16 BRPM | HEART RATE: 67 BPM | OXYGEN SATURATION: 98 % | SYSTOLIC BLOOD PRESSURE: 126 MMHG | TEMPERATURE: 99 F | HEIGHT: 61 IN

## 2023-07-26 DIAGNOSIS — D69.1 PLATELET DYSFUNCTION (HCC): Primary | Chronic | ICD-10-CM

## 2023-07-26 DIAGNOSIS — I10 ESSENTIAL HYPERTENSION, BENIGN: ICD-10-CM

## 2023-07-26 DIAGNOSIS — E11.9 TYPE 2 DIABETES MELLITUS WITHOUT COMPLICATION, WITHOUT LONG-TERM CURRENT USE OF INSULIN (HCC): ICD-10-CM

## 2023-07-26 DIAGNOSIS — Z00.00 ENCOUNTER FOR ANNUAL HEALTH EXAMINATION: ICD-10-CM

## 2023-07-26 DIAGNOSIS — Z78.0 POSTMENOPAUSAL: ICD-10-CM

## 2023-07-26 DIAGNOSIS — E78.00 PURE HYPERCHOLESTEROLEMIA: ICD-10-CM

## 2023-07-26 DIAGNOSIS — Z12.31 ENCOUNTER FOR SCREENING MAMMOGRAM FOR BREAST CANCER: ICD-10-CM

## 2023-07-26 DIAGNOSIS — E66.01 CLASS 3 SEVERE OBESITY DUE TO EXCESS CALORIES WITHOUT SERIOUS COMORBIDITY IN ADULT, UNSPECIFIED BMI (HCC): ICD-10-CM

## 2023-07-26 LAB
ALBUMIN SERPL-MCNC: 3.9 G/DL (ref 3.4–5)
ALBUMIN/GLOB SERPL: 1.2 {RATIO} (ref 1–2)
ALP LIVER SERPL-CCNC: 71 U/L
ALT SERPL-CCNC: 24 U/L
ANION GAP SERPL CALC-SCNC: 7 MMOL/L (ref 0–18)
AST SERPL-CCNC: 10 U/L (ref 15–37)
BILIRUB SERPL-MCNC: 0.5 MG/DL (ref 0.1–2)
BUN BLD-MCNC: 12 MG/DL (ref 7–18)
CALCIUM BLD-MCNC: 9.2 MG/DL (ref 8.5–10.1)
CHLORIDE SERPL-SCNC: 107 MMOL/L (ref 98–112)
CHOLEST SERPL-MCNC: 157 MG/DL (ref ?–200)
CO2 SERPL-SCNC: 25 MMOL/L (ref 21–32)
CREAT BLD-MCNC: 0.83 MG/DL
EGFRCR SERPLBLD CKD-EPI 2021: 78 ML/MIN/1.73M2 (ref 60–?)
EST. AVERAGE GLUCOSE BLD GHB EST-MCNC: 148 MG/DL (ref 68–126)
FASTING PATIENT LIPID ANSWER: YES
FASTING STATUS PATIENT QL REPORTED: YES
GLOBULIN PLAS-MCNC: 3.3 G/DL (ref 2.8–4.4)
GLUCOSE BLD-MCNC: 117 MG/DL (ref 70–99)
HBA1C MFR BLD: 6.8 % (ref ?–5.7)
HDLC SERPL-MCNC: 42 MG/DL (ref 40–59)
LDLC SERPL CALC-MCNC: 84 MG/DL (ref ?–100)
NONHDLC SERPL-MCNC: 115 MG/DL (ref ?–130)
OSMOLALITY SERPL CALC.SUM OF ELEC: 289 MOSM/KG (ref 275–295)
POTASSIUM SERPL-SCNC: 4 MMOL/L (ref 3.5–5.1)
PROT SERPL-MCNC: 7.2 G/DL (ref 6.4–8.2)
SODIUM SERPL-SCNC: 139 MMOL/L (ref 136–145)
TRIGL SERPL-MCNC: 180 MG/DL (ref 30–149)
VLDLC SERPL CALC-MCNC: 29 MG/DL (ref 0–30)

## 2023-07-26 PROCEDURE — 36415 COLL VENOUS BLD VENIPUNCTURE: CPT

## 2023-07-26 PROCEDURE — G0439 PPPS, SUBSEQ VISIT: HCPCS | Performed by: INTERNAL MEDICINE

## 2023-07-26 PROCEDURE — 83036 HEMOGLOBIN GLYCOSYLATED A1C: CPT

## 2023-07-26 PROCEDURE — 80053 COMPREHEN METABOLIC PANEL: CPT

## 2023-07-26 PROCEDURE — 80061 LIPID PANEL: CPT

## 2023-07-26 RX ORDER — SIMVASTATIN 10 MG
10 TABLET ORAL NIGHTLY
Qty: 90 TABLET | Refills: 0 | COMMUNITY
Start: 2023-07-26

## 2023-07-27 DIAGNOSIS — E11.9 TYPE 2 DIABETES MELLITUS WITHOUT COMPLICATION, WITHOUT LONG-TERM CURRENT USE OF INSULIN (HCC): Primary | ICD-10-CM

## 2023-08-13 RX ORDER — ALPRAZOLAM 0.25 MG/1
0.25 TABLET ORAL EVERY 6 HOURS PRN
Qty: 30 TABLET | Refills: 0 | Status: SHIPPED | OUTPATIENT
Start: 2023-08-13

## 2023-08-13 RX ORDER — MONTELUKAST SODIUM 10 MG/1
10 TABLET ORAL DAILY
Qty: 90 TABLET | Refills: 0 | Status: SHIPPED | OUTPATIENT
Start: 2023-08-13

## 2023-09-12 NOTE — DISCHARGE INSTRUCTIONS
Home Care Instructions for Colonoscopy  with Sedation    Diet:  - Resume your regular diet as tolerated unless otherwise instructed. - Start with light meals to minimize bloating.  - Do not drink alcohol today. Medication:  - If you have questions about resuming your normal medications, please contact your Primary Care Physician. Activities:  - Take it easy today. Do not return to work today. - Do not drive today. - Do not operate any machinery today (including kitchen equipment). Colonoscopy:  - You may notice some rectal \"spotting\" (a little blood on the toilet tissue) for a day or two after the exam. This is normal.  - If you experience any rectal bleeding (not spotting), persistent tenderness or sharp severe abdominal pains, oral temperature over 100 degrees Fahrenheit, light-headedness or dizziness, or any other problems, contact your doctor. **If unable to reach your doctor, please go to the [facility] Emergency Room**    - Your referring physician will receive a full report of your examination.  - If you do not hear from your doctor's office within two weeks of your biopsy, please call them for your results. Detail Level: Detailed Detail Level: Generalized

## 2023-09-29 NOTE — TELEPHONE ENCOUNTER
Last office visit: 7/26/23  Last refill: 8/13/23 qty 30  Labs Due: : 10/27/23  No future appointments.

## 2023-10-01 RX ORDER — ALPRAZOLAM 0.25 MG/1
0.25 TABLET ORAL EVERY 6 HOURS PRN
Qty: 30 TABLET | Refills: 0 | Status: SHIPPED | OUTPATIENT
Start: 2023-10-01

## 2023-11-06 ENCOUNTER — OFFICE VISIT (OUTPATIENT)
Dept: FAMILY MEDICINE CLINIC | Facility: CLINIC | Age: 68
End: 2023-11-06
Payer: MEDICARE

## 2023-11-06 ENCOUNTER — HOSPITAL ENCOUNTER (OUTPATIENT)
Dept: GENERAL RADIOLOGY | Age: 68
Discharge: HOME | End: 2023-11-06
Attending: INTERNAL MEDICINE
Payer: MEDICARE

## 2023-11-06 VITALS
SYSTOLIC BLOOD PRESSURE: 126 MMHG | RESPIRATION RATE: 16 BRPM | DIASTOLIC BLOOD PRESSURE: 78 MMHG | TEMPERATURE: 98 F | HEART RATE: 69 BPM | OXYGEN SATURATION: 96 %

## 2023-11-06 DIAGNOSIS — M54.50 LUMBAR PAIN: Primary | ICD-10-CM

## 2023-11-06 DIAGNOSIS — M54.50 LUMBAR PAIN: ICD-10-CM

## 2023-11-06 PROCEDURE — 99214 OFFICE O/P EST MOD 30 MIN: CPT | Performed by: INTERNAL MEDICINE

## 2023-11-06 PROCEDURE — 72110 X-RAY EXAM L-2 SPINE 4/>VWS: CPT | Performed by: INTERNAL MEDICINE

## 2023-11-06 RX ORDER — MELOXICAM 15 MG/1
15 TABLET ORAL DAILY
Qty: 90 TABLET | Refills: 0 | Status: SHIPPED | OUTPATIENT
Start: 2023-11-06 | End: 2024-02-04

## 2023-11-09 RX ORDER — MONTELUKAST SODIUM 10 MG/1
10 TABLET ORAL DAILY
Qty: 90 TABLET | Refills: 0 | Status: SHIPPED | OUTPATIENT
Start: 2023-11-09

## 2023-11-09 NOTE — TELEPHONE ENCOUNTER
OV 11/06  REFILL 08/16 #90    Requested Prescriptions     Pending Prescriptions Disp Refills    MONTELUKAST 10 MG Oral Tab [Pharmacy Med Name: MONTELUKAST 10MG TABLETS] 90 tablet 0     Sig: TAKE 1 TABLET(10 MG) BY MOUTH DAILY     NO FUTURE APPOINTMENTS.        Asthma & COPD Medication Protocol [id number]/09/2023 03:05 PM    Asthma Action Score greater than or equal to 20    AAP/ACT given in last 12 months    Appointment in past 6 or next 3 months

## 2023-11-10 DIAGNOSIS — I10 ESSENTIAL HYPERTENSION, BENIGN: ICD-10-CM

## 2023-11-10 RX ORDER — METOPROLOL SUCCINATE 50 MG/1
50 TABLET, EXTENDED RELEASE ORAL DAILY
Qty: 90 TABLET | Refills: 1 | Status: SHIPPED | OUTPATIENT
Start: 2023-11-10

## 2023-11-10 NOTE — TELEPHONE ENCOUNTER
Last office visit: 11/6/23  Last refill: 2/2/23  Last labs: 7/26/23  Future Appointments   Date Time Provider Richard Leary   11/13/2023  3:45 PM Merceda Rashad, PT [facility] [place]   11/30/2023  2:00 PM Merceda Rashad, PT [facility] [place]   12/4/2023  2:00 PM Merceda Rashad, PT [facility] [place]   12/11/2023  2:00 PM Merceda Rashad, PT [facility] [place]   12/18/2023  1:15 PM Merceda Rashad, PT [facility] [place]   12/26/2023  1:15 PM Merceda Rashad, PT [facility] [place]   1/2/2024  1:15 PM Merceda Rashad, PT [facility] [place]   1/9/2024  1:15 PM Merceda Rashad, PT [facility] [place]      metoprolol succinate ER 50 MG Oral Tablet 24 Hr         Sig: Take 1 tablet (50 mg total) by mouth daily.     Disp: 90 tablet    Refills: 1    Start: 11/10/2023    Class: Normal    Non-formulary For: Essential hypertension, benign    Last ordered: 9 months ago (2/2/2023) by Julio Cesar Arreaga MD    Hypertension Medications Protocol Nyyxjs25/10/2023 09:47 AM   Protocol Details CMP or BMP in past 12 months    Last serum creatinine< 2.0    Appointment in past 6 or next 3 months      To be filled at: [address] Jorge Phillips [address] [place] [address] ([id number]), [phone], [phone]

## 2023-11-13 ENCOUNTER — OFFICE VISIT (OUTPATIENT)
Dept: PHYSICAL THERAPY | Age: 68
End: 2023-11-13
Attending: INTERNAL MEDICINE
Payer: MEDICARE

## 2023-11-13 DIAGNOSIS — M54.50 LUMBAR PAIN: Primary | ICD-10-CM

## 2023-11-13 PROCEDURE — 97110 THERAPEUTIC EXERCISES: CPT

## 2023-11-13 PROCEDURE — 97140 MANUAL THERAPY 1/> REGIONS: CPT

## 2023-11-13 PROCEDURE — 97162 PT EVAL MOD COMPLEX 30 MIN: CPT

## 2023-11-17 ENCOUNTER — OFFICE VISIT (OUTPATIENT)
Dept: PHYSICAL THERAPY | Age: 68
End: 2023-11-17
Attending: INTERNAL MEDICINE
Payer: MEDICARE

## 2023-11-17 PROCEDURE — 97110 THERAPEUTIC EXERCISES: CPT

## 2023-11-17 PROCEDURE — 97112 NEUROMUSCULAR REEDUCATION: CPT

## 2023-11-17 PROCEDURE — 97140 MANUAL THERAPY 1/> REGIONS: CPT

## 2023-11-17 NOTE — PROGRESS NOTES
Diagnosis:   Lumbar pain (M54.50)       Referring Provider: Melo Lima  Date of Evaluation:    11/13/23    Precautions:   Seizure Next MD visit:   none scheduled  Date of Surgery: n/a   Insurance Primary/Secondary: MEDICARE / [facility] hospitals     # Auth Visits: Medical necessity, progress notes every 10 visits            Subjective: Pt reports pain into L buttock and outer thigh. She states the figure 4 stretch feels good. Pain: 5/10      Objective  11/17/23- SIJ torsion L side posterior TTP and tight pirifomris and coccygeus   All of the below objective measures are from evaluation for reference:  Lumbar AROM: (* denotes performed with pain)  Flexion: 60  Extension: 8 deg   Sidebending: R 44; L 52 cm Finger tip to floor    Assessment: Additions of TA bracing with mod cues for breath support and tactile cues for PPT. Pt completes without increased pain co and reduction of pain end of visit. Goals:   (to be met in 10 visits)   Pt will improve transversus abdominis recruitment to perform proper isometric contraction without requiring verbal or tactile cuing to promote advancement of therex   Pt will demonstrate good understanding of proper posture and body mechanics to decrease pain and improve spinal safety   Pt will improve lumbar spine AROM SB to<48 cm Finger tip to floor to allow increase ease with bending forward to don shoes   Pt will report improved symptom centralization and absence of radicular symptoms for 3 consecutive days to improve function with ADL   Pt will have decreased paraspinal mm tension to tolerate walking >10 minutes for work and home activities   Pt will demonstrate improved core strength to be able to perform modified dead bug with <6/10 pain   Pt will be independent and compliant with comprehensive HEP to maintain progress achieved in PT     Plan: Address soft tissue length, core contraction, lateral hip strength for improved pain control and return to PLOF.   Date: 11/17/2023  TX#: 2/10 Date:                 TX#: 3/ Date:                 TX#: 4/ Date:                 TX#: 5/ Date: Tx#: 6/   THERE EX:  Prone press  to elbows 10x10  Piriformis stretch 30 secs 3x each LE figure 4 and IR with pull on lower leg        MANUAL:  Prone over pillow PA SI and TP L side grade 3-4 5 mins  Prone over pillow STM piriforms lumbar paraspinals, coccygeus and glut 15 mins effleurage       NEURO RE ED:  TA brace with PPT and hip ADD ball squeeze for pelvic floor recruitment 10\"x10*              HEP: Denoted with *  Access Code: [email or web address]  URL: [email or web address]  Date: 11/13/2023  Prepared by: Sveta General     Exercises  - Prone Press Up On Elbows  - 1 x daily - 7 x weekly - 3 sets - 5 reps - 1 min hold  - Standing Lumbar Extension at [address]  - 1 x daily - 7 x weekly - 3 sets - 10 reps - 5 secs hold  - Supine Figure 4 Piriformis Stretch  - 1 x daily - 7 x weekly - 3 sets - 1 reps - 30 sec hold  - Supine Piriformis Stretch with Foot on Ground  - 1 x daily - 7 x weekly - 3 sets - 1 reps - 30 secs hold  - Supine Piriformis Stretch Pulling Heel to Hip  - 1 x daily - 7 x weekly - 3 sets - 1 reps - 30 hold  - Supine Transversus Abdominis Bracing - Hands on Stomach  - 1 x daily - 7 x weekly - 2 sets - 10 reps - 5 sec hold  Charges: 1 manual 1 neuro re ed 1 ther ex       Total Timed Treatment: 45 min  Total Treatment Time: 45 min

## 2023-11-20 ENCOUNTER — OFFICE VISIT (OUTPATIENT)
Dept: PHYSICAL THERAPY | Age: 68
End: 2023-11-20
Attending: INTERNAL MEDICINE
Payer: MEDICARE

## 2023-11-20 PROCEDURE — 97110 THERAPEUTIC EXERCISES: CPT

## 2023-11-20 PROCEDURE — 97140 MANUAL THERAPY 1/> REGIONS: CPT

## 2023-11-20 NOTE — PROGRESS NOTES
Diagnosis:   Lumbar pain (M54.50)       Referring Provider: Emeli Rayo  Date of Evaluation:    11/13/23    Precautions:   Seizure Next MD visit:   none scheduled  Date of Surgery: n/a   Insurance Primary/Secondary: MEDICARE / 3 hospitals     # Auth Visits: Medical necessity, progress notes every 10 visits            Subjective: Pt states her weekend was good, a little bit of pain into L hip and buttock just to greater troch, more intense back pain. Pt notes she did not use her cushion for sitting and felt better without it. Pain: 4/10      Objective  11/20/23- Potential bursitis L greater troch  11/17/23- SIJ torsion L side posterior TTP and tight pirifomris and coccygeus   All of the below objective measures are from evaluation for reference:  Lumbar AROM: (* denotes performed with pain)  Flexion: 60  Extension: 8 deg   Sidebending: R 44; L 52 cm Finger tip to floor    Assessment: Seated stretches, HEP review for radiating pain and seated set up with lumbar roll as pt prepares for long day of watching [employer] tournament in sitting. Pt returns demo on all. Pain decreased and centralized to low back by end of visit.     Goals:   (to be met in 10 visits)   Pt will improve transversus abdominis recruitment to perform proper isometric contraction without requiring verbal or tactile cuing to promote advancement of therex   Pt will demonstrate good understanding of proper posture and body mechanics to decrease pain and improve spinal safety   Pt will improve lumbar spine AROM SB to<48 cm Finger tip to floor to allow increase ease with bending forward to don shoes   Pt will report improved symptom centralization and absence of radicular symptoms for 3 consecutive days to improve function with ADL   Pt will have decreased paraspinal mm tension to tolerate walking >10 minutes for work and home activities   Pt will demonstrate improved core strength to be able to perform modified dead bug with <6/10 pain   Pt will be independent and compliant with comprehensive HEP to maintain progress achieved in PT     Plan: Address soft tissue length, core contraction, lateral hip strength for improved pain control and return to PLOF. Date: 11/17/2023  TX#: 2/10 Date:  11/20/23               TX#: 3/10 Date:                 TX#: 4/ Date:                 TX#: 5/ Date: Tx#: 6/   THERE EX:  Prone press  to elbows 10x10  Piriformis stretch 30 secs 3x each LE figure 4 and IR with pull on lower leg  THERE EX: 12 mins  Prone over pillow to elbow press 10x10    SL hip flexor stretch 30 sec 3x LLE  LLE clams 10x3*  Seated positioning with mock lumbar roll reviewed <6 mins  Seated piriformis stretch 30 sec 3x*      MANUAL:  Prone over pillow PA SI and TP L side grade 3-4 5 mins  Prone over pillow STM piriforms lumbar paraspinals, coccygeus and glut 15 mins effleurage MANUAL: 33 mins  MET for L on R SIJ 4x 10 sec hold  Prone over pillow PA SI and TP L side grade 3-4 6 mins  Prone over pillow STM piriforms lumbar paraspinals, coccygeus and glut 18 mins effleurage      NEURO RE ED:  TA brace with PPT and hip ADD ball squeeze for pelvic floor recruitment 10\"x10*              HEP: Denoted with *  Access Code: [email or web address]  URL: [email or web address]  Date: 11/13/2023  Prepared by: Greyson Bellis     Exercises  - Prone Press Up On Elbows  - 1 x daily - 7 x weekly - 3 sets - 5 reps - 1 min hold  - Standing Lumbar Extension at [address]  - 1 x daily - 7 x weekly - 3 sets - 10 reps - 5 secs hold  - Supine Figure 4 Piriformis Stretch  - 1 x daily - 7 x weekly - 3 sets - 1 reps - 30 sec hold  - Supine Piriformis Stretch with Foot on Ground  - 1 x daily - 7 x weekly - 3 sets - 1 reps - 30 secs hold  - Supine Piriformis Stretch Pulling Heel to Hip  - 1 x daily - 7 x weekly - 3 sets - 1 reps - 30 hold  - Supine Transversus Abdominis Bracing - Hands on Stomach  - 1 x daily - 7 x weekly - 2 sets - 10 reps - 5 sec hold  - Clamshell  - 1 x daily - 7 x weekly - 3 sets - 10 reps  Charges: 2 manual 1 ther ex       Total Timed Treatment: 45 min  Total Treatment Time: 45 min

## 2023-11-27 ENCOUNTER — TELEPHONE (OUTPATIENT)
Dept: PHYSICAL THERAPY | Facility: HOSPITAL | Age: 68
End: 2023-11-27

## 2023-11-27 ENCOUNTER — TELEPHONE (OUTPATIENT)
Dept: FAMILY MEDICINE CLINIC | Facility: CLINIC | Age: 68
End: 2023-11-27

## 2023-11-27 ENCOUNTER — APPOINTMENT (OUTPATIENT)
Dept: PHYSICAL THERAPY | Age: 68
End: 2023-11-27
Attending: INTERNAL MEDICINE
Payer: MEDICARE

## 2023-11-27 NOTE — TELEPHONE ENCOUNTER
Patient said that she had the chills yesterday and had a headache. She has been taking Tylenol. She took the anti-inflammatory this morning and it helped with body aches. She is also congested. She had a positive home test. She uses [facility] if Dr Arlet Gutierres can prescribe Paxlovid.

## 2023-11-30 ENCOUNTER — APPOINTMENT (OUTPATIENT)
Dept: PHYSICAL THERAPY | Age: 68
End: 2023-11-30
Attending: INTERNAL MEDICINE
Payer: MEDICARE

## 2023-12-04 ENCOUNTER — APPOINTMENT (OUTPATIENT)
Dept: PHYSICAL THERAPY | Age: 68
End: 2023-12-04
Attending: INTERNAL MEDICINE
Payer: MEDICARE

## 2023-12-11 ENCOUNTER — OFFICE VISIT (OUTPATIENT)
Dept: PHYSICAL THERAPY | Age: 68
End: 2023-12-11
Attending: INTERNAL MEDICINE
Payer: MEDICARE

## 2023-12-11 PROCEDURE — 97140 MANUAL THERAPY 1/> REGIONS: CPT

## 2023-12-11 PROCEDURE — 97110 THERAPEUTIC EXERCISES: CPT

## 2023-12-11 NOTE — PROGRESS NOTES
Diagnosis:   Lumbar pain (M54.50)       Referring Provider: Romario Real  Date of Evaluation:    11/13/23    Precautions:   Seizure Next MD visit:   none scheduled  Date of Surgery: n/a   Insurance Primary/Secondary: MEDICARE / Denise Nielsen     # Auth Visits: Medical necessity, progress notes every 10 visits            Subjective: Pt returns to PT after hiatus 2/2 illness. Pt repots feeling out of breath since illness and that she could not do much being so sick. She co more pain in L low back and buttock. She states she was able to go the wrestling tournament and sit without any issue. Was able to previously get on and off the floor without pain either. Pain: 8/10      Objective  11/20/23- Potential bursitis L greater troch  11/17/23- SIJ torsion L side posterior TTP and tight pirifomris and coccygeus   All of the below objective measures are from evaluation for reference:  Lumbar AROM: (* denotes performed with pain)  Flexion: 60  Extension: 8 deg   Sidebending: R 44; L 52 cm Finger tip to floor    Assessment: Added hook lying clams with resistance as SL was more uncomfortable unless in neutral. Continued core stabilization with build to bridge using PPT.     Goals:   (to be met in 10 visits)   Pt will improve transversus abdominis recruitment to perform proper isometric contraction without requiring verbal or tactile cuing to promote advancement of therex   Pt will demonstrate good understanding of proper posture and body mechanics to decrease pain and improve spinal safety   Pt will improve lumbar spine AROM SB to<48 cm Finger tip to floor to allow increase ease with bending forward to don shoes   Pt will report improved symptom centralization and absence of radicular symptoms for 3 consecutive days to improve function with ADL   Pt will have decreased paraspinal mm tension to tolerate walking >10 minutes for work and home activities   Pt will demonstrate improved core strength to be able to perform modified dead bug with <6/10 pain   Pt will be independent and compliant with comprehensive HEP to maintain progress achieved in PT     Plan: Progress soft tissue length, core stabilization and lateral hip strength for improved pain control and return to PLOF. Date: 11/17/2023  TX#: 2/10 Date:  11/20/23               TX#: 3/10 Date: 12/11/23                TX#: 4/10 Date:                 TX#: 5/ Date: Tx#: 6/   THERE EX:  Prone press  to elbows 10x10  Piriformis stretch 30 secs 3x each LE figure 4 and IR with pull on lower leg  THERE EX: 12 mins  Prone over pillow to elbow press 10x10    SL hip flexor stretch 30 sec 3x LLE  LLE clams 10x3*  Seated positioning with mock lumbar roll reviewed <6 mins  Seated piriformis stretch 30 sec 3x* THERE EX: 24 mins   Prone over pillow to elbow press 10x10    SL hip flexor stretch 30 sec 3x LLE   LLE clams 10x3* neutral *  Hook lying clams green band 10x2* every other day  PPT with hip ADD ball squeeze 10x2 3 sec hold  Manual IR/ER with OP at glut 10x each prone  Mod pigeon pose 30 secs 3x*     MANUAL:  Prone over pillow PA SI and TP L side grade 3-4 5 mins  Prone over pillow STM piriforms lumbar paraspinals, coccygeus and glut 15 mins effleurage MANUAL: 33 mins  MET for L on R SIJ 4x 10 sec hold  Prone over pillow PA SI and TP L side grade 3-4 6 mins  Prone over pillow STM piriforms lumbar paraspinals, coccygeus and glut 18 mins effleurage MANUAL: 21 mins    Prone PA SI and TP L side grade 3-4 6 mins  Prone over pillow STM piriforms lumbar paraspinals, coccygeus and glut 15 mins effleurage     NEURO RE ED:  TA brace with PPT and hip ADD ball squeeze for pelvic floor recruitment 10\"x10*              HEP: Denoted with *  Access Code: [email or web address]  URL: [employer]/  Date: 11/13/2023  Prepared by: Isabel Heart  Access Code: Kristal Sutton: [employer]/  Date: 12/11/2023  Prepared by: Radha Rain  - 1 x daily - 7 x weekly - 3 sets - 5 reps - 1 min hold  - Standing Lumbar Extension at [address]  - 1 x daily - 7 x weekly - 3 sets - 10 reps - 5 secs hold  - Supine Figure 4 Piriformis Stretch  - 1 x daily - 7 x weekly - 3 sets - 1 reps - 30 sec hold  - Supine Piriformis Stretch with Foot on Ground  - 1 x daily - 7 x weekly - 3 sets - 1 reps - 30 secs hold  - Supine Piriformis Stretch Pulling Heel to Hip  - 1 x daily - 7 x weekly - 3 sets - 1 reps - 30 hold  - Supine Transversus Abdominis Bracing - Hands on Stomach  - 1 x daily - 7 x weekly - 2 sets - 10 reps - 5 sec hold  - Sidelying Clamshell in Neutral  - 1 x daily - 7 x weekly - 3 sets - 10 reps  - Hooklying Clamshell with Resistance  - 1 x daily - 4 x weekly - 2 sets - 15 reps  - Seated Table Piriformis Stretch  - 1 x daily - 7 x weekly - 3 sets - 1 reps - 30 sec hold  Charges: 1 manual 2 ther ex       Total Timed Treatment: 45 min  Total Treatment Time: 45 min

## 2023-12-15 ENCOUNTER — APPOINTMENT (OUTPATIENT)
Dept: PHYSICAL THERAPY | Age: 68
End: 2023-12-15
Attending: INTERNAL MEDICINE
Payer: MEDICARE

## 2023-12-18 ENCOUNTER — APPOINTMENT (OUTPATIENT)
Dept: PHYSICAL THERAPY | Age: 68
End: 2023-12-18
Attending: INTERNAL MEDICINE
Payer: MEDICARE

## 2023-12-19 ENCOUNTER — TELEPHONE (OUTPATIENT)
Dept: PHYSICAL THERAPY | Age: 68
End: 2023-12-19

## 2023-12-19 ENCOUNTER — APPOINTMENT (OUTPATIENT)
Dept: PHYSICAL THERAPY | Age: 68
End: 2023-12-19
Attending: INTERNAL MEDICINE
Payer: MEDICARE

## 2023-12-19 NOTE — TELEPHONE ENCOUNTER
Spoke with pt regarding rescheduling given pt family emergency.  Patient agrees to apt 12/22/23 at 11:30 am

## 2023-12-20 ENCOUNTER — TELEPHONE (OUTPATIENT)
Dept: FAMILY MEDICINE CLINIC | Facility: CLINIC | Age: 68
End: 2023-12-20

## 2023-12-20 RX ORDER — MELOXICAM 15 MG/1
15 TABLET ORAL DAILY
Qty: 90 TABLET | Refills: 0 | Status: SHIPPED | OUTPATIENT
Start: 2023-12-20 | End: 2024-03-19

## 2023-12-20 NOTE — TELEPHONE ENCOUNTER
LOV 11/6/23 with Dr. Arnol Rodrigez for sciatic pain. Started on Meloxicam  Pt called today - states Meloxicam has been working along with physical therapy but has had a set back this week with her mom in the hospital  Pain has increased  Asking if she can increase Meloxicam dose?   Taking 15mg daily

## 2023-12-20 NOTE — TELEPHONE ENCOUNTER
Pt called in - given Dr. Sheila Saavedra recommendations. Meloxicam is at the maximum dose. Has been using [facility]. Encouraged ice packs and alternating with a heating pad. Notify office if pain worsens.   Pt verbalized understanding

## 2023-12-22 ENCOUNTER — APPOINTMENT (OUTPATIENT)
Dept: PHYSICAL THERAPY | Age: 68
End: 2023-12-22
Attending: INTERNAL MEDICINE
Payer: MEDICARE

## 2023-12-22 NOTE — PROGRESS NOTES
Diagnosis:   Lumbar pain (M54.50)       Referring Provider: Kash Redding  Date of Evaluation:    11/13/23    Precautions:   Seizure Next MD visit:   none scheduled  Date of Surgery: n/a   Insurance Primary/Secondary: MEDICARE / [facility]     # Auth Visits: Medical necessity, progress notes every 10 visits            Subjective: Pt returns to PT after hiatus 2/2 illness. Pt repots feeling out of breath since illness and that she could not do much being so sick. She co more pain in L low back and buttock. She states she was able to go the [employer] tournament and sit without any issue. Was able to previously get on and off the floor without pain either. Pain: 8/10      Objective  11/20/23- Potential bursitis L greater troch  11/17/23- SIJ torsion L side posterior TTP and tight pirifomris and coccygeus   All of the below objective measures are from evaluation for reference:  Lumbar AROM: (* denotes performed with pain)  Flexion: 60  Extension: 8 deg   Sidebending: R 44; L 52 cm Finger tip to floor    Assessment: Added hook lying clams with resistance as SL was more uncomfortable unless in neutral. Continued core stabilization with build to bridge using PPT.     Goals:   (to be met in 10 visits)   Pt will improve transversus abdominis recruitment to perform proper isometric contraction without requiring verbal or tactile cuing to promote advancement of therex   Pt will demonstrate good understanding of proper posture and body mechanics to decrease pain and improve spinal safety   Pt will improve lumbar spine AROM SB to<48 cm Finger tip to floor to allow increase ease with bending forward to don shoes   Pt will report improved symptom centralization and absence of radicular symptoms for 3 consecutive days to improve function with ADL   Pt will have decreased paraspinal mm tension to tolerate walking >10 minutes for work and home activities   Pt will demonstrate improved core strength to be able to perform modified dead bug with <6/10 pain   Pt will be independent and compliant with comprehensive HEP to maintain progress achieved in PT     Plan: Progress soft tissue length, core stabilization and lateral hip strength for improved pain control and return to PLOF. Date: 11/17/2023  TX#: 2/10 Date:  11/20/23               TX#: 3/10 Date: 12/11/23                TX#: 4/10 Date: 12/22/23                 TX#: 5/10 Date: Tx#: 6/   THERE EX:  Prone press  to elbows 10x10  Piriformis stretch 30 secs 3x each LE figure 4 and IR with pull on lower leg  THERE EX: 12 mins  Prone over pillow to elbow press 10x10    SL hip flexor stretch 30 sec 3x LLE  LLE clams 10x3*  Seated positioning with mock lumbar roll reviewed <6 mins  Seated piriformis stretch 30 sec 3x* THERE EX: 24 mins   Prone over pillow to elbow press 10x10    SL hip flexor stretch 30 sec 3x LLE   LLE clams 10x3* neutral *  Hook lying clams green band 10x2* every other day  PPT with hip ADD ball squeeze 10x2 3 sec hold  Manual IR/ER with OP at glut 10x each prone  Mod pigeon pose 30 secs 3x*     MANUAL:  Prone over pillow PA SI and TP L side grade 3-4 5 mins  Prone over pillow STM piriforms lumbar paraspinals, coccygeus and glut 15 mins effleurage MANUAL: 33 mins  MET for L on R SIJ 4x 10 sec hold  Prone over pillow PA SI and TP L side grade 3-4 6 mins  Prone over pillow STM piriforms lumbar paraspinals, coccygeus and glut 18 mins effleurage MANUAL: 21 mins    Prone PA SI and TP L side grade 3-4 6 mins  Prone over pillow STM piriforms lumbar paraspinals, coccygeus and glut 15 mins effleurage     NEURO RE ED:  TA brace with PPT and hip ADD ball squeeze for pelvic floor recruitment 10\"x10*              HEP: Denoted with *  Access Code: [email or web address]  URL: [employer]/  Date: 11/13/2023  Prepared by: Corsair     Exercises  Access Code: Rosalio Champ: [email or web address]  Date: 12/11/2023  Prepared by: Angolan [employer]    Exercises  - Prone Press Up On Elbows  - 1 x daily - 7 x weekly - 3 sets - 5 reps - 1 min hold  - Standing Lumbar Extension at Wall - Forearms  - 1 x daily - 7 x weekly - 3 sets - 10 reps - 5 secs hold  - Supine Figure 4 Piriformis Stretch  - 1 x daily - 7 x weekly - 3 sets - 1 reps - 30 sec hold  - Supine Piriformis Stretch with Foot on Ground  - 1 x daily - 7 x weekly - 3 sets - 1 reps - 30 secs hold  - Supine Piriformis Stretch Pulling Heel to Hip  - 1 x daily - 7 x weekly - 3 sets - 1 reps - 30 hold  - Supine Transversus Abdominis Bracing - Hands on Stomach  - 1 x daily - 7 x weekly - 2 sets - 10 reps - 5 sec hold  - Sidelying Clamshell in Neutral  - 1 x daily - 7 x weekly - 3 sets - 10 reps  - Hooklying Clamshell with Resistance  - 1 x daily - 4 x weekly - 2 sets - 15 reps  - Seated Table Piriformis Stretch  - 1 x daily - 7 x weekly - 3 sets - 1 reps - 30 sec hold  Charges: 1 manual 2 ther ex       Total Timed Treatment: 45 min  Total Treatment Time: 45 min

## 2023-12-26 ENCOUNTER — OFFICE VISIT (OUTPATIENT)
Dept: PHYSICAL THERAPY | Age: 68
End: 2023-12-26
Attending: INTERNAL MEDICINE
Payer: MEDICARE

## 2023-12-26 PROCEDURE — 97110 THERAPEUTIC EXERCISES: CPT

## 2023-12-26 PROCEDURE — 97140 MANUAL THERAPY 1/> REGIONS: CPT

## 2023-12-26 NOTE — PROGRESS NOTES
Diagnosis:   Lumbar pain (M54.50)       Referring Provider: Melo Lima  Date of Evaluation:    11/13/23    Precautions:   Seizure Next MD visit:   none scheduled  Date of Surgery: n/a   Insurance Primary/Secondary: MEDICARE / [facility]     # Auth Visits: Medical necessity, progress notes every 10 visits            Subjective: Pt has had a recent family emergency and has felt a little better. She states she has been diligent with her stretches    Pain: 5/10      Objective  11/20/23- Potential bursitis L greater troch  11/17/23- SIJ torsion L side posterior TTP and tight pirifomris and coccygeus   All of the below objective measures are from evaluation for reference:  Lumbar AROM: (* denotes performed with pain)  Flexion: 60  Extension: 8 deg   Sidebending: R 44; L 52 cm Finger tip to floor    Assessment: SI Jt MET  for L on R, improving segmental mobility. Goals:   (to be met in 10 visits)   Pt will improve transversus abdominis recruitment to perform proper isometric contraction without requiring verbal or tactile cuing to promote advancement of therex   Pt will demonstrate good understanding of proper posture and body mechanics to decrease pain and improve spinal safety   Pt will improve lumbar spine AROM SB to<48 cm Finger tip to floor to allow increase ease with bending forward to don shoes   Pt will report improved symptom centralization and absence of radicular symptoms for 3 consecutive days to improve function with ADL   Pt will have decreased paraspinal mm tension to tolerate walking >10 minutes for work and home activities   Pt will demonstrate improved core strength to be able to perform modified dead bug with <6/10 pain   Pt will be independent and compliant with comprehensive HEP to maintain progress achieved in PT     Plan: Progress soft tissue length, core stabilization and lateral hip strength for improved pain control and return to PLOF. Add L MET for L SI jt pain.   Date: 11/17/2023  TX#: 2/10 Date:  11/20/23               TX#: 3/10 Date: 12/11/23                TX#: 4/10 Date: 12/26/23                TX#: 5/10 Date: Tx#: 6/ THERE EX:  Prone press  to elbows 10x10  Piriformis stretch 30 secs 3x each LE figure 4 and IR with pull on lower leg  THERE EX: 12 mins  Prone over pillow to elbow press 10x10    SL hip flexor stretch 30 sec 3x LLE  LLE clams 10x3*  Seated positioning with mock lumbar roll reviewed <6 mins  Seated piriformis stretch 30 sec 3x* THERE EX: 24 mins   Prone over pillow to elbow press 10x10    SL hip flexor stretch 30 sec 3x LLE   LLE clams 10x3* neutral *  Hook lying clams green band 10x2* every other day  PPT with hip ADD ball squeeze 10x2 3 sec hold  Manual IR/ER with OP at glut 10x each prone  Mod pigeon pose 30 secs 3x* THERE EX: 10 mins   Prone over pillow to elbow press 10x10    SL hip flexor stretch 30 sec 3x LLE   Supine manual ROM B hips 5 mins      MANUAL:  Prone over pillow PA SI and TP L side grade 3-4 5 mins  Prone over pillow STM piriforms lumbar paraspinals, coccygeus and glut 15 mins effleurage MANUAL: 33 mins  MET for L on R SIJ 4x 10 sec hold  Prone over pillow PA SI and TP L side grade 3-4 6 mins  Prone over pillow STM piriforms lumbar paraspinals, coccygeus and glut 18 mins effleurage MANUAL: 21 mins    Prone PA SI and TP L side grade 3-4 6 mins  Prone over pillow STM piriforms lumbar paraspinals, coccygeus and glut 15 mins effleurage MANUAL: 33 mins  Prone PA SI and SP L side grade 3-4 6 mins  Prone over pillow STM piriforms lumbar paraspinals, coccygeus and glut 18 mins effleurage Restriction noted in L glut  MET for L on R for L Si jt pain, 10 sec 5x     NEURO RE ED:  TA brace with PPT and hip ADD ball squeeze for pelvic floor recruitment 10\"x10*              HEP: Denoted with *  Access Code: [email or web address]  URL: [employer]/  Date: 11/13/2023  Prepared by: Mariana Hong     Exercises  Access Code: Jerome Marking: [email or web address]  Date: 12/11/2023  Prepared by: Vandana Running    Exercises  - Prone Press Up On Elbows  - 1 x daily - 7 x weekly - 3 sets - 5 reps - 1 min hold  - Standing Lumbar Extension at [address]  - 1 x daily - 7 x weekly - 3 sets - 10 reps - 5 secs hold  - Supine Figure 4 Piriformis Stretch  - 1 x daily - 7 x weekly - 3 sets - 1 reps - 30 sec hold  - Supine Piriformis Stretch with Foot on Ground  - 1 x daily - 7 x weekly - 3 sets - 1 reps - 30 secs hold  - Supine Piriformis Stretch Pulling Heel to Hip  - 1 x daily - 7 x weekly - 3 sets - 1 reps - 30 hold  - Supine Transversus Abdominis Bracing - Hands on Stomach  - 1 x daily - 7 x weekly - 2 sets - 10 reps - 5 sec hold  - Sidelying Clamshell in Neutral  - 1 x daily - 7 x weekly - 3 sets - 10 reps  - Hooklying Clamshell with Resistance  - 1 x daily - 4 x weekly - 2 sets - 15 reps  - Seated Table Piriformis Stretch  - 1 x daily - 7 x weekly - 3 sets - 1 reps - 30 sec hold  Charges: 2 manual 1 ther ex       Total Timed Treatment: 43 min  Total Treatment Time: 45 min

## 2023-12-27 RX ORDER — ALPRAZOLAM 0.25 MG/1
0.25 TABLET ORAL EVERY 6 HOURS PRN
Qty: 30 TABLET | Refills: 0 | Status: SHIPPED | OUTPATIENT
Start: 2023-12-27

## 2023-12-27 NOTE — TELEPHONE ENCOUNTER
LOV:11/6/23  LAST LAB:7/26/23  LAST RX:  ALPRAZolam 0.25 MG Oral Tab 30 tablet 0 10/1/2023 --   Sig:   Take 1 tablet (0.25 mg total) by mouth every 6 (six) hours as needed.      Next OV:  Future Appointments   Date Time Provider Richard Leary   1/2/2024  1:15 PM Arthurine Call, PT [facility] [place]   1/9/2024  1:15 PM Arthurine Call, PT [facility] [place]   1/15/2024  9:15 AM Arthurine Call, PT [facility] [place]   1/18/2024  2:45 PM Arthurine Call, PT [facility] [place]   1/23/2024  2:45 PM Arthurine Call, PT [facility] [place]   1/25/2024  2:45 PM Arthurine Call, PT [facility] [place]   2/6/2024 10:45 AM Arthurine Call, PT [facility] [place]   2/9/2024 10:45 AM Arthurine Call, PT [facility] [place]   2/13/2024 10:45 AM Arthurine Call, PT [facility] [place]   2/16/2024 10:45 AM Arthurine Call, PT [facility] [place]   PROTOCOL:none

## 2023-12-28 RX ORDER — LEVETIRACETAM 750 MG/1
750 TABLET ORAL 2 TIMES DAILY
Qty: 180 TABLET | Refills: 3 | Status: SHIPPED | OUTPATIENT
Start: 2023-12-28

## 2023-12-29 ENCOUNTER — APPOINTMENT (OUTPATIENT)
Dept: PHYSICAL THERAPY | Age: 68
End: 2023-12-29
Attending: INTERNAL MEDICINE
Payer: MEDICARE

## 2023-12-29 ENCOUNTER — OFFICE VISIT (OUTPATIENT)
Dept: PHYSICAL THERAPY | Age: 68
End: 2023-12-29
Attending: INTERNAL MEDICINE
Payer: MEDICARE

## 2023-12-29 DIAGNOSIS — I10 ESSENTIAL HYPERTENSION, BENIGN: ICD-10-CM

## 2023-12-29 PROCEDURE — 97110 THERAPEUTIC EXERCISES: CPT

## 2023-12-29 PROCEDURE — 97035 APP MDLTY 1+ULTRASOUND EA 15: CPT

## 2023-12-29 PROCEDURE — 97140 MANUAL THERAPY 1/> REGIONS: CPT

## 2023-12-29 RX ORDER — AMLODIPINE BESYLATE 10 MG/1
10 TABLET ORAL DAILY
Qty: 90 TABLET | Refills: 0 | Status: SHIPPED | OUTPATIENT
Start: 2023-12-29 | End: 2024-06-26

## 2023-12-29 NOTE — PROGRESS NOTES
Diagnosis:   Lumbar pain (M54.50)       Referring Provider: José Miguel Cleveland  Date of Evaluation:    11/13/23    Precautions:   Seizure Next MD visit:   none scheduled  Date of Surgery: n/a   Insurance Primary/Secondary: MEDICARE / Carol Mishra     # Auth Visits: Medical necessity, progress notes every 10 visits            Subjective: Pt states she has been sleeping on a floor, pain increased, \"hurts and you want to push on it\". She states its very uncomfortable. Pain: 5/10      Objective  12/29/23- Deep knotty restriction in R mutlifidi  11/20/23- Potential bursitis L greater troch  11/17/23- SIJ torsion L side posterior TTP and tight pirifomris and coccygeus   All of the below objective measures are from evaluation for reference:  Lumbar AROM: (* denotes performed with pain)  Flexion: 60  Extension: 8 deg   Sidebending: R 44; L 52 cm Finger tip to floor    Assessment: Palpable restriction in R mulitifus near SI junction, improved with US, followed with STM and manual stretching for tissue length and relief after Si MET and hip isos to maintain stability. Self stretch with OP added to HEP.     Goals:   (to be met in 10 visits)   Pt will improve transversus abdominis recruitment to perform proper isometric contraction without requiring verbal or tactile cuing to promote advancement of therex   Pt will demonstrate good understanding of proper posture and body mechanics to decrease pain and improve spinal safety   Pt will improve lumbar spine AROM SB to<48 cm Finger tip to floor to allow increase ease with bending forward to don shoes   Pt will report improved symptom centralization and absence of radicular symptoms for 3 consecutive days to improve function with ADL   Pt will have decreased paraspinal mm tension to tolerate walking >10 minutes for work and home activities   Pt will demonstrate improved core strength to be able to perform modified dead bug with <6/10 pain   Pt will be independent and compliant with comprehensive HEP to maintain progress achieved in PT     Plan: Progress soft tissue length, core stabilization and lateral hip strength for improved pain control and return to PLOF. Add L MET for L SI jt pain.   Date: 11/17/2023  TX#: 2/10 Date:  11/20/23               TX#: 3/10 Date: 12/11/23                TX#: 4/10 Date: 12/26/23                TX#: 5/10 Date: 12/29/23   Tx#: 6/10   THERE EX:  Prone press  to elbows 10x10  Piriformis stretch 30 secs 3x each LE figure 4 and IR with pull on lower leg  THERE EX: 12 mins  Prone over pillow to elbow press 10x10    SL hip flexor stretch 30 sec 3x LLE  LLE clams 10x3*  Seated positioning with mock lumbar roll reviewed <6 mins  Seated piriformis stretch 30 sec 3x* THERE EX: 24 mins   Prone over pillow to elbow press 10x10    SL hip flexor stretch 30 sec 3x LLE   LLE clams 10x3* neutral *  Hook lying clams green band 10x2* every other day  PPT with hip ADD ball squeeze 10x2 3 sec hold  Manual IR/ER with OP at glut 10x each prone  Mod pigeon pose 30 secs 3x* THERE EX: 10 mins   Prone over pillow to elbow press 10x10    SL hip flexor stretch 30 sec 3x LLE   Supine manual ROM B hips 5 mins   THERE EX: 15 mins  Hooklying Hip ADD ball squeeze 3 sec hold 10x3  Self stretch for supine piriformis using UE leverage cross body, Knees crossed, lumbar rotation (ql stretch) 30 secs 3x*  Manual piriformis, glut stretch 30 secs 4x RLE   MANUAL:  Prone over pillow PA SI and TP L side grade 3-4 5 mins  Prone over pillow STM piriforms lumbar paraspinals, coccygeus and glut 15 mins effleurage MANUAL: 33 mins  MET for L on R SIJ 4x 10 sec hold  Prone over pillow PA SI and TP L side grade 3-4 6 mins  Prone over pillow STM piriforms lumbar paraspinals, coccygeus and glut 18 mins effleurage MANUAL: 21 mins    Prone PA SI and TP L side grade 3-4 6 mins  Prone over pillow STM piriforms lumbar paraspinals, coccygeus and glut 15 mins effleurage MANUAL: 33 mins  Prone PA SI and SP L side grade 3-4 6 mins  Prone over pillow STM piriforms lumbar paraspinals, coccygeus and glut 18 mins effleurage Restriction noted in L glut  MET for L on R for L Si jt pain, 10 sec 5x  MANUAL:22 mins  Prone over pillow STM piriforms lumbar paraspinals, coccygeus, multifidi and glut 20 mins effleurage Restriction noted in L glut  MET for L on R for L Si jt pain, 10 sec 5x    NEURO RE ED:  TA brace with PPT and hip ADD ball squeeze for pelvic floor recruitment 10\"x10*    MODALITIES  US 3.3 W/cm2 1.2 MHz CV head warming on, prone, R multidus near SI jt 8 mins          HEP: Denoted with *  Access Code: [email or web address]  URL: [employer]/  Date: 11/13/2023  Prepared by: Vandana Running     Exercises  Access Code: Zeenat Muta: [employer]/  Date: 12/11/2023  Prepared by: Vandana Running    Exercises  - Prone Press Up On Elbows  - 1 x daily - 7 x weekly - 3 sets - 5 reps - 1 min hold  - Standing Lumbar Extension at [address]  - 1 x daily - 7 x weekly - 3 sets - 10 reps - 5 secs hold  - Supine Figure 4 Piriformis Stretch  - 1 x daily - 7 x weekly - 3 sets - 1 reps - 30 sec hold  - Supine Piriformis Stretch with Foot on Ground  - 1 x daily - 7 x weekly - 3 sets - 1 reps - 30 secs hold  - Supine Piriformis Stretch Pulling Heel to Hip  - 1 x daily - 7 x weekly - 3 sets - 1 reps - 30 hold  - Supine Transversus Abdominis Bracing - Hands on Stomach  - 1 x daily - 7 x weekly - 2 sets - 10 reps - 5 sec hold  - Sidelying Clamshell in Neutral  - 1 x daily - 7 x weekly - 3 sets - 10 reps  - Hooklying Clamshell with Resistance  - 1 x daily - 4 x weekly - 2 sets - 15 reps  - Seated Table Piriformis Stretch  - 1 x daily - 7 x weekly - 3 sets - 1 reps - 30 sec hold  Access Code: [email or web address]  URL: [employer]/  Date: 12/29/2023  Prepared by: Vandana Running    Exercises  - Supine Piriformis Stretch  - 1 x daily - 7 x weekly - 3 sets - 1 reps - 30 hold  - Supine Piriformis Stretch Pulling Heel to Hip  - 1 x daily - 7 x weekly - 3 sets - 1 reps - 30 hold  - Supine Quadratus Lumborum Stretch  - 1 x daily - 7 x weekly - 3 sets - 1 reps - 30 hold  Charges: 1 manual 1 ther ex   1 US    Total Timed Treatment: 45 min  Total Treatment Time: 45 min

## 2023-12-29 NOTE — TELEPHONE ENCOUNTER
Last refill - 6/28/23 - #90 with 1 refill  Marked as not taking. Contacted patient - states is taking medication.   Last CMP - 7/26/23 -creatinine- 0.83  Last office visit - 11/6/23  Refilled per protocol

## 2024-01-02 ENCOUNTER — APPOINTMENT (OUTPATIENT)
Dept: PHYSICAL THERAPY | Age: 69
End: 2024-01-02
Attending: INTERNAL MEDICINE
Payer: MEDICARE

## 2024-01-02 RX ORDER — BUDESONIDE AND FORMOTEROL FUMARATE DIHYDRATE 80; 4.5 UG/1; UG/1
2 AEROSOL RESPIRATORY (INHALATION) 2 TIMES DAILY
Qty: 10.2 G | Refills: 0 | Status: SHIPPED | OUTPATIENT
Start: 2024-01-02

## 2024-01-04 ENCOUNTER — TELEPHONE (OUTPATIENT)
Dept: FAMILY MEDICINE CLINIC | Facility: CLINIC | Age: 69
End: 2024-01-04

## 2024-01-04 DIAGNOSIS — D69.1 PLATELET DYSFUNCTION (HCC): Primary | ICD-10-CM

## 2024-01-04 RX ORDER — CLONAZEPAM 0.5 MG/1
0.5 TABLET ORAL 2 TIMES DAILY PRN
Qty: 10 TABLET | Refills: 0 | Status: SHIPPED | OUTPATIENT
Start: 2024-01-04

## 2024-01-04 NOTE — TELEPHONE ENCOUNTER
ClonazePAM 0.5 MG Oral Tab - [facility]  Pt states a refill was sent for alprazolam today but she didn't request it so she refused the medication.

## 2024-01-04 NOTE — TELEPHONE ENCOUNTER
Patient said that she has that she has been taking the Alprazolam. She said that Dr Chavez told her to always take the Clonazepam 1mg with her in the event that she has a seizure. She said she has never had to take it but wants to have it on hand. She said that she is going to [place] at the end of the month.   She will come back for her blood work.

## 2024-01-09 ENCOUNTER — APPOINTMENT (OUTPATIENT)
Dept: PHYSICAL THERAPY | Age: 69
End: 2024-01-09
Attending: INTERNAL MEDICINE
Payer: MEDICARE

## 2024-01-15 ENCOUNTER — TELEPHONE (OUTPATIENT)
Dept: FAMILY MEDICINE CLINIC | Facility: CLINIC | Age: 69
End: 2024-01-15

## 2024-01-15 ENCOUNTER — OFFICE VISIT (OUTPATIENT)
Dept: PHYSICAL THERAPY | Age: 69
End: 2024-01-15
Attending: INTERNAL MEDICINE
Payer: MEDICARE

## 2024-01-15 PROCEDURE — 97140 MANUAL THERAPY 1/> REGIONS: CPT

## 2024-01-15 PROCEDURE — 97035 APP MDLTY 1+ULTRASOUND EA 15: CPT

## 2024-01-15 PROCEDURE — 97110 THERAPEUTIC EXERCISES: CPT

## 2024-01-15 RX ORDER — PREDNISONE 20 MG/1
40 TABLET ORAL DAILY
Qty: 14 TABLET | Refills: 0 | Status: SHIPPED | OUTPATIENT
Start: 2024-01-15 | End: 2024-01-22

## 2024-01-15 NOTE — TELEPHONE ENCOUNTER
Patient came into the office after physical therapy. She states that the sciatic pain has gotten better after taking the Prednisone and going to PT. She has been taking care of her Mother who was recently placed on Hospice. She is going to [place] at the end of January and asked if Dr Infante would order 5 days of Prednisone 40mg to [facility] so she can take it with her in case she needs it.

## 2024-01-15 NOTE — PROGRESS NOTES
Diagnosis:   Lumbar pain (M54.50)       Referring Provider: Naresh  Date of Evaluation:    11/13/23    Precautions:   Seizure Next MD visit:   none scheduled  Date of Surgery: n/a   Insurance Primary/Secondary: MEDICARE / [employer] [place] INDEMNITY     # Auth Visits: Medical necessity, progress notes every 10 visits            Subjective: Pt states she has only slept in her own bed once in the last few weeks. L side hurts more than R today. Very stressed with family needs.     Pain: 5/10      Objective  12/29/23- Deep knotty restriction in R mutlifidi  11/20/23- Potential bursitis L greater troch  11/17/23- SIJ torsion L side posterior TTP and tight pirifomris and coccygeus   All of the below objective measures are from evaluation for reference:  Lumbar AROM: (* denotes performed with pain)  Flexion: 60  Extension: 8 deg   Sidebending: R 44; L 52 cm Finger tip to floor    Assessment: Improving soft tissue extensibility in glut and piriformis. Added TA push downs, mod cues for breath support as pt tends to hold breath on contraction.    Goals:   (to be met in 10 visits)   Pt will improve transversus abdominis recruitment to perform proper isometric contraction without requiring verbal or tactile cuing to promote advancement of therex   Pt will demonstrate good understanding of proper posture and body mechanics to decrease pain and improve spinal safety   Pt will improve lumbar spine AROM SB to<48 cm Finger tip to floor to allow increase ease with bending forward to don shoes   Pt will report improved symptom centralization and absence of radicular symptoms for 3 consecutive days to improve function with ADL   Pt will have decreased paraspinal mm tension to tolerate walking >10 minutes for work and home activities   Pt will demonstrate improved core strength to be able to perform modified dead bug with <6/10 pain   Pt will be independent and compliant with comprehensive HEP to maintain progress achieved in PT     Plan: Progress  soft tissue length, core stabilization and lateral hip strength for improved pain control and return to PLOF. Add L MET for L SI jt pain.  Date: 12/11/23                TX#: 4/10 Date: 12/26/23                TX#: 5/10 Date: 12/29/23   Tx#: 6/10 Date: 1/15/24  TX#: 7/10   THERE EX: 24 mins   Prone over pillow to elbow press 10x10    SL hip flexor stretch 30 sec 3x LLE   LLE clams 10x3* neutral *  Hook lying clams green band 10x2* every other day  PPT with hip ADD ball squeeze 10x2 3 sec hold  Manual IR/ER with OP at glut 10x each prone  Mod pigeon pose 30 secs 3x* THERE EX: 10 mins   Prone over pillow to elbow press 10x10    SL hip flexor stretch 30 sec 3x LLE   Supine manual ROM B hips 5 mins   THERE EX: 15 mins  Hooklying Hip ADD ball squeeze 3 sec hold 10x3  Self stretch for supine piriformis using UE leverage cross body, Knees crossed, lumbar rotation (ql stretch) 30 secs 3x*  Manual piriformis, glut stretch 30 secs 4x RLE THERE EX: 8 mins  Self stretch review with OP   Manual piriformis, glut stretch 60 secs 4x RLE1   MANUAL: 21 mins    Prone PA SI and TP L side grade 3-4 6 mins  Prone over pillow STM piriforms lumbar paraspinals, coccygeus and glut 15 mins effleurage MANUAL: 33 mins  Prone PA SI and SP L side grade 3-4 6 mins  Prone over pillow STM piriforms lumbar paraspinals, coccygeus and glut 18 mins effleurage Restriction noted in L glut  MET for L on R for L Si jt pain, 10 sec 5x  MANUAL:22 mins  Prone over pillow STM piriforms lumbar paraspinals, coccygeus, multifidi and glut 20 mins effleurage Restriction noted in L glut  MET for L on R for L Si jt pain, 10 sec 5x  MANUAL:20 mins  Prone over pillow STM piriforms lumbar paraspinals, coccygeus, multifidi and glut 20 mins effleurage Restriction noted in L glut       MODALITIES  US 3.3 W/cm2 1.2 MHz CV head warming on, prone, R multidus near SI jt 8 mins MODALITIES  US 3.3 W/cm2 1.4 MHz CV head warming on, prone, R  and L multidus near SI jt 8 mins       NEURO RE ED: 6 mins  TA foam roll resisted push downs 10x2 3 sec hold mod cues for breath support   HEP: Denoted with *  Access Code: [email or web address]  URL: [email or web address]  Date: 11/13/2023  Prepared by: Elsa Rosario     Exercises  Access Code: [email or web address]  URL: [email or web address]  Date: 12/11/2023  Prepared by: Elsa Rosario    Exercises  - Prone Press Up On Elbows  - 1 x daily - 7 x weekly - 3 sets - 5 reps - 1 min hold  - Standing Lumbar Extension at Wall - Forearms  - 1 x daily - 7 x weekly - 3 sets - 10 reps - 5 secs hold  - Supine Figure 4 Piriformis Stretch  - 1 x daily - 7 x weekly - 3 sets - 1 reps - 30 sec hold  - Supine Piriformis Stretch with Foot on Ground  - 1 x daily - 7 x weekly - 3 sets - 1 reps - 30 secs hold  - Supine Piriformis Stretch Pulling Heel to Hip  - 1 x daily - 7 x weekly - 3 sets - 1 reps - 30 hold  - Supine Transversus Abdominis Bracing - Hands on Stomach  - 1 x daily - 7 x weekly - 2 sets - 10 reps - 5 sec hold  - Sidelying Clamshell in Neutral  - 1 x daily - 7 x weekly - 3 sets - 10 reps  - Hooklying Clamshell with Resistance  - 1 x daily - 4 x weekly - 2 sets - 15 reps  - Seated Table Piriformis Stretch  - 1 x daily - 7 x weekly - 3 sets - 1 reps - 30 sec hold  Access Code: [email or web address]  URL: [email or web address]  Date: 12/29/2023  Prepared by: Elsa Rosario    Exercises  - Supine Piriformis Stretch  - 1 x daily - 7 x weekly - 3 sets - 1 reps - 30 hold  - Supine Piriformis Stretch Pulling Heel to Hip  - 1 x daily - 7 x weekly - 3 sets - 1 reps - 30 hold  - Supine Quadratus Lumborum Stretch  - 1 x daily - 7 x weekly - 3 sets - 1 reps - 30 hold  Charges: 1 manual 1 ther ex   1 US    Total Timed Treatment: 42 min  Total Treatment Time: 45 min

## 2024-01-18 ENCOUNTER — OFFICE VISIT (OUTPATIENT)
Dept: PHYSICAL THERAPY | Age: 69
End: 2024-01-18
Attending: INTERNAL MEDICINE
Payer: MEDICARE

## 2024-01-18 PROCEDURE — 97035 APP MDLTY 1+ULTRASOUND EA 15: CPT

## 2024-01-18 PROCEDURE — 97140 MANUAL THERAPY 1/> REGIONS: CPT

## 2024-01-18 NOTE — PROGRESS NOTES
Diagnosis:   Lumbar pain (M54.50)       Referring Provider: Naresh  Date of Evaluation:    11/13/23    Precautions:   Seizure Next MD visit:   none scheduled  Date of Surgery: n/a   Insurance Primary/Secondary: MEDICARE / [employer] [place] INDEMNITY     # Auth Visits: Medical necessity, progress notes every 10 visits            Subjective: Pt states she fell on the ice on her back on Tuesday and then again 1/17/24 in the evening trying to vacuum.     Pain: 5/10      Objective  12/29/23- Deep knotty restriction in R mutlifidi  11/20/23- Potential bursitis L greater troch  11/17/23- SIJ torsion L side posterior TTP and tight pirifomris and coccygeus   All of the below objective measures are from evaluation for reference:  Lumbar AROM: (* denotes performed with pain)  Flexion: 60  Extension: 8 deg   Sidebending: R 44; L 52 cm Finger tip to floor    Assessment: Focus on pain mitigation and soft tissue release following falls. Continued restriction L glut    Goals:   (to be met in 10 visits)   Pt will improve transversus abdominis recruitment to perform proper isometric contraction without requiring verbal or tactile cuing to promote advancement of therex   Pt will demonstrate good understanding of proper posture and body mechanics to decrease pain and improve spinal safety   Pt will improve lumbar spine AROM SB to<48 cm Finger tip to floor to allow increase ease with bending forward to don shoes   Pt will report improved symptom centralization and absence of radicular symptoms for 3 consecutive days to improve function with ADL   Pt will have decreased paraspinal mm tension to tolerate walking >10 minutes for work and home activities   Pt will demonstrate improved core strength to be able to perform modified dead bug with <6/10 pain   Pt will be independent and compliant with comprehensive HEP to maintain progress achieved in PT     Plan: Progress soft tissue length, core stabilization and lateral hip strength for improved pain  control and return to PLOF. Add L MET for L SI jt pain.  Date: 12/11/23                TX#: 4/10 Date: 12/26/23                TX#: 5/10 Date: 12/29/23   Tx#: 6/10 Date: 1/15/24  TX#: 7/10 Date: 1/18/24  TX#: 8/10   THERE EX: 24 mins   Prone over pillow to elbow press 10x10    SL hip flexor stretch 30 sec 3x LLE   LLE clams 10x3* neutral *  Hook lying clams green band 10x2* every other day  PPT with hip ADD ball squeeze 10x2 3 sec hold  Manual IR/ER with OP at glut 10x each prone  Mod pigeon pose 30 secs 3x* THERE EX: 10 mins   Prone over pillow to elbow press 10x10    SL hip flexor stretch 30 sec 3x LLE   Supine manual ROM B hips 5 mins   THERE EX: 15 mins  Hooklying Hip ADD ball squeeze 3 sec hold 10x3  Self stretch for supine piriformis using UE leverage cross body, Knees crossed, lumbar rotation (ql stretch) 30 secs 3x*  Manual piriformis, glut stretch 30 secs 4x RLE THERE EX: 8 mins  Self stretch review with OP   Manual piriformis, glut stretch 60 secs 4x RLE1 THERE EX: 4 mins    Manual piriformis, glut stretch 60 secs 4x RLE   MANUAL: 21 mins    Prone PA SI and TP L side grade 3-4 6 mins  Prone over pillow STM piriforms lumbar paraspinals, coccygeus and glut 15 mins effleurage MANUAL: 33 mins  Prone PA SI and SP L side grade 3-4 6 mins  Prone over pillow STM piriforms lumbar paraspinals, coccygeus and glut 18 mins effleurage Restriction noted in L glut  MET for L on R for L Si jt pain, 10 sec 5x  MANUAL:22 mins  Prone over pillow STM piriforms lumbar paraspinals, coccygeus, multifidi and glut 20 mins effleurage Restriction noted in L glut  MET for L on R for L Si jt pain, 10 sec 5x  MANUAL:20 mins  Prone over pillow STM piriforms lumbar paraspinals, coccygeus, multifidi and glut 20 mins effleurage Restriction noted in L glut   MANUAL:25 mins  Prone over pillow STM piriforms lumbar paraspinals, coccygeus, multifidi and glut, to c-and T paraspinals 25 mins effleurage Restriction noted in L glut       MODALITIES  US 3.3 W/cm2 1.2 MHz CV head warming on, prone, R multidus near SI jt 8 mins MODALITIES  US 3.3 W/cm2 1.4 MHz CV head warming on, prone, R  and L multidus near SI jt 8 mins MODALITIES  US 3.3 W/cm2 1.4 MHz CV head warming on, prone, R  and L multidus near SI jt 8 mins      NEURO RE ED: 6 mins  TA foam roll resisted push downs 10x2 3 sec hold mod cues for breath support    HEP: Denoted with *  Access Code: [email or web address]  URL: [email or web address]  Date: 11/13/2023  Prepared by: Elsa Rosario     Exercises  Access Code: [email or web address]  URL: [email or web address]  Date: 12/11/2023  Prepared by: Elsa Rosario    Exercises  - Prone Press Up On Elbows  - 1 x daily - 7 x weekly - 3 sets - 5 reps - 1 min hold  - Standing Lumbar Extension at Wall - Forearms  - 1 x daily - 7 x weekly - 3 sets - 10 reps - 5 secs hold  - Supine Figure 4 Piriformis Stretch  - 1 x daily - 7 x weekly - 3 sets - 1 reps - 30 sec hold  - Supine Piriformis Stretch with Foot on Ground  - 1 x daily - 7 x weekly - 3 sets - 1 reps - 30 secs hold  - Supine Piriformis Stretch Pulling Heel to Hip  - 1 x daily - 7 x weekly - 3 sets - 1 reps - 30 hold  - Supine Transversus Abdominis Bracing - Hands on Stomach  - 1 x daily - 7 x weekly - 2 sets - 10 reps - 5 sec hold  - Sidelying Clamshell in Neutral  - 1 x daily - 7 x weekly - 3 sets - 10 reps  - Hooklying Clamshell with Resistance  - 1 x daily - 4 x weekly - 2 sets - 15 reps  - Seated Table Piriformis Stretch  - 1 x daily - 7 x weekly - 3 sets - 1 reps - 30 sec hold  Access Code: [email or web address]  URL: [email or web address]  Date: 12/29/2023  Prepared by: Elsa Rosario    Exercises  - Supine Piriformis Stretch  - 1 x daily - 7 x weekly - 3 sets - 1 reps - 30 hold  - Supine Piriformis Stretch Pulling Heel to Hip  - 1 x daily - 7 x weekly - 3 sets - 1 reps - 30 hold  - Supine Quadratus Lumborum Stretch  - 1 x daily - 7 x weekly - 3 sets - 1 reps - 30 hold  Charges: 2 manual  1 US     Total Timed Treatment: 33 min  Total Treatment Time: 42 min

## 2024-01-22 ENCOUNTER — APPOINTMENT (OUTPATIENT)
Dept: PHYSICAL THERAPY | Age: 69
End: 2024-01-22
Attending: INTERNAL MEDICINE
Payer: MEDICARE

## 2024-01-23 ENCOUNTER — APPOINTMENT (OUTPATIENT)
Dept: PHYSICAL THERAPY | Age: 69
End: 2024-01-23
Attending: INTERNAL MEDICINE
Payer: MEDICARE

## 2024-01-23 ENCOUNTER — TELEPHONE (OUTPATIENT)
Dept: PHYSICAL THERAPY | Facility: HOSPITAL | Age: 69
End: 2024-01-23

## 2024-01-25 ENCOUNTER — APPOINTMENT (OUTPATIENT)
Dept: PHYSICAL THERAPY | Age: 69
End: 2024-01-25
Attending: INTERNAL MEDICINE
Payer: MEDICARE

## 2024-01-25 NOTE — PROGRESS NOTES
Diagnosis:   Lumbar pain (M54.50)       Referring Provider: Naresh  Date of Evaluation:    11/13/23    Precautions:   Seizure Next MD visit:   none scheduled  Date of Surgery: n/a   Insurance Primary/Secondary: MEDICARE / [employer] INDEMNITY     # Auth Visits: Medical necessity, progress notes every 10 visits            Subjective: Pt states she fell on the ice on her back on Tuesday and then again 1/17/24 in the evening trying to vacuum.     Pain: 5/10      Objective  12/29/23- Deep knotty restriction in R mutlifidi  11/20/23- Potential bursitis L greater troch  11/17/23- SIJ torsion L side posterior TTP and tight pirifomris and coccygeus   All of the below objective measures are from evaluation for reference:  Lumbar AROM: (* denotes performed with pain)  Flexion: 60  Extension: 8 deg   Sidebending: R 44; L 52 cm Finger tip to floor    Assessment: Focus on pain mitigation and soft tissue release following falls. Continued restriction L glut    Goals:   (to be met in 10 visits)   Pt will improve transversus abdominis recruitment to perform proper isometric contraction without requiring verbal or tactile cuing to promote advancement of therex   Pt will demonstrate good understanding of proper posture and body mechanics to decrease pain and improve spinal safety   Pt will improve lumbar spine AROM SB to<48 cm Finger tip to floor to allow increase ease with bending forward to don shoes   Pt will report improved symptom centralization and absence of radicular symptoms for 3 consecutive days to improve function with ADL   Pt will have decreased paraspinal mm tension to tolerate walking >10 minutes for work and home activities   Pt will demonstrate improved core strength to be able to perform modified dead bug with <6/10 pain   Pt will be independent and compliant with comprehensive HEP to maintain progress achieved in PT     Plan: Progress soft tissue length, core stabilization and lateral hip strength for improved pain  control and return to PLOF. Add L MET for L SI jt pain.  Date: 12/11/23                TX#: 4/10 Date: 12/26/23                TX#: 5/10 Date: 12/29/23   Tx#: 6/10 Date: 1/15/24  TX#: 7/10 Date: 1/18/24  TX#: 8/10 Date: 1/25/24  TX#: 9/10   THERE EX: 24 mins   Prone over pillow to elbow press 10x10    SL hip flexor stretch 30 sec 3x LLE   LLE clams 10x3* neutral *  Hook lying clams green band 10x2* every other day  PPT with hip ADD ball squeeze 10x2 3 sec hold  Manual IR/ER with OP at glut 10x each prone  Mod pigeon pose 30 secs 3x* THERE EX: 10 mins   Prone over pillow to elbow press 10x10    SL hip flexor stretch 30 sec 3x LLE   Supine manual ROM B hips 5 mins   THERE EX: 15 mins  Hooklying Hip ADD ball squeeze 3 sec hold 10x3  Self stretch for supine piriformis using UE leverage cross body, Knees crossed, lumbar rotation (ql stretch) 30 secs 3x*  Manual piriformis, glut stretch 30 secs 4x RLE THERE EX: 8 mins  Self stretch review with OP   Manual piriformis, glut stretch 60 secs 4x RLE1 THERE EX: 4 mins    Manual piriformis, glut stretch 60 secs 4x RLE    MANUAL: 21 mins    Prone PA SI and TP L side grade 3-4 6 mins  Prone over pillow STM piriforms lumbar paraspinals, coccygeus and glut 15 mins effleurage MANUAL: 33 mins  Prone PA SI and SP L side grade 3-4 6 mins  Prone over pillow STM piriforms lumbar paraspinals, coccygeus and glut 18 mins effleurage Restriction noted in L glut  MET for L on R for L Si jt pain, 10 sec 5x  MANUAL:22 mins  Prone over pillow STM piriforms lumbar paraspinals, coccygeus, multifidi and glut 20 mins effleurage Restriction noted in L glut  MET for L on R for L Si jt pain, 10 sec 5x  MANUAL:20 mins  Prone over pillow STM piriforms lumbar paraspinals, coccygeus, multifidi and glut 20 mins effleurage Restriction noted in L glut   MANUAL:25 mins  Prone over pillow STM piriforms lumbar paraspinals, coccygeus, multifidi and glut, to c-and T paraspinals 25 mins effleurage Restriction noted  in L glut       MODALITIES  US 3.3 W/cm2 1.2 MHz CV head warming on, prone, R multidus near SI jt 8 mins MODALITIES  US 3.3 W/cm2 1.4 MHz CV head warming on, prone, R  and L multidus near SI jt 8 mins MODALITIES  US 3.3 W/cm2 1.4 MHz CV head warming on, prone, R  and L multidus near SI jt 8 mins       NEURO RE ED: 6 mins  TA foam roll resisted push downs 10x2 3 sec hold mod cues for breath support     HEP: Denoted with *  Access Code: [email or web address]  URL: [email or web address]  Date: 11/13/2023  Prepared by: Elsa Rosario     Exercises  Access Code: [email or web address]  URL: [email or web address]  Date: 12/11/2023  Prepared by: Elsa Rosario    Exercises  - Prone Press Up On Elbows  - 1 x daily - 7 x weekly - 3 sets - 5 reps - 1 min hold  - Standing Lumbar Extension at Wall - Forearms  - 1 x daily - 7 x weekly - 3 sets - 10 reps - 5 secs hold  - Supine Figure 4 Piriformis Stretch  - 1 x daily - 7 x weekly - 3 sets - 1 reps - 30 sec hold  - Supine Piriformis Stretch with Foot on Ground  - 1 x daily - 7 x weekly - 3 sets - 1 reps - 30 secs hold  - Supine Piriformis Stretch Pulling Heel to Hip  - 1 x daily - 7 x weekly - 3 sets - 1 reps - 30 hold  - Supine Transversus Abdominis Bracing - Hands on Stomach  - 1 x daily - 7 x weekly - 2 sets - 10 reps - 5 sec hold  - Sidelying Clamshell in Neutral  - 1 x daily - 7 x weekly - 3 sets - 10 reps  - Hooklying Clamshell with Resistance  - 1 x daily - 4 x weekly - 2 sets - 15 reps  - Seated Table Piriformis Stretch  - 1 x daily - 7 x weekly - 3 sets - 1 reps - 30 sec hold  Access Code: [email or web address]  URL: [email or web address]  Date: 12/29/2023  Prepared by: Elsa Rosario    Exercises  - Supine Piriformis Stretch  - 1 x daily - 7 x weekly - 3 sets - 1 reps - 30 hold  - Supine Piriformis Stretch Pulling Heel to Hip  - 1 x daily - 7 x weekly - 3 sets - 1 reps - 30 hold  - Supine Quadratus Lumborum Stretch  - 1 x daily - 7 x weekly - 3 sets - 1 reps - 30 hold  Charges: 2  manual  1 US    Total Timed Treatment: 33 min  Total Treatment Time: 42 min

## 2024-02-06 ENCOUNTER — OFFICE VISIT (OUTPATIENT)
Dept: PHYSICAL THERAPY | Age: 69
End: 2024-02-06
Attending: INTERNAL MEDICINE
Payer: MEDICARE

## 2024-02-06 PROCEDURE — 97140 MANUAL THERAPY 1/> REGIONS: CPT

## 2024-02-06 PROCEDURE — 97110 THERAPEUTIC EXERCISES: CPT

## 2024-02-06 NOTE — PROGRESS NOTES
Diagnosis:   Lumbar pain (M54.50)       Referring Provider: Naresh  Date of Evaluation:    11/13/23    Precautions:   Seizure Next MD visit:   none scheduled  Date of Surgery: n/a   Insurance Primary/Secondary: MEDICARE / [place] INDEMNITY     # Auth Visits: Medical necessity, progress notes every 10 visits            Subjective:  Pt returns from [place] trip exhausted. Was on steroids until Friday and felt better with them .    Pain: 5/10      Objective  12/29/23- Deep knotty restriction in R mutlifidi  11/20/23- Potential bursitis L greater troch  11/17/23- SIJ torsion L side posterior TTP and tight pirifomris and coccygeus   All of the below objective measures are from evaluation for reference:  Lumbar AROM: (* denotes performed with pain)  Flexion: 60  Extension: 8 deg   Sidebending: R 44; L 52 cm Finger tip to floor    Assessment: Continued restriction L glut, ease of motion improving, Held strength given return from hiatus.    Goals:   (to be met in 10 visits)   Pt will improve transversus abdominis recruitment to perform proper isometric contraction without requiring verbal or tactile cuing to promote advancement of therex   Pt will demonstrate good understanding of proper posture and body mechanics to decrease pain and improve spinal safety   Pt will improve lumbar spine AROM SB to<48 cm Finger tip to floor to allow increase ease with bending forward to don shoes   Pt will report improved symptom centralization and absence of radicular symptoms for 3 consecutive days to improve function with ADL   Pt will have decreased paraspinal mm tension to tolerate walking >10 minutes for work and home activities   Pt will demonstrate improved core strength to be able to perform modified dead bug with <6/10 pain   Pt will be independent and compliant with comprehensive HEP to maintain progress achieved in PT     Plan: Progress soft tissue length, core stabilization and lateral hip strength for improved pain control and  return to PLOF.   Date: 12/11/23                TX#: 4/10 Date: 12/26/23                TX#: 5/10 Date: 12/29/23   Tx#: 6/10 Date: 1/15/24  TX#: 7/10 Date: 1/18/24  TX#: 8/10 Date: 2/6/24  TX#: 9/10   THERE EX: 24 mins   Prone over pillow to elbow press 10x10    SL hip flexor stretch 30 sec 3x LLE   LLE clams 10x3* neutral *  Hook lying clams green band 10x2* every other day  PPT with hip ADD ball squeeze 10x2 3 sec hold  Manual IR/ER with OP at glut 10x each prone  Mod pigeon pose 30 secs 3x* THERE EX: 10 mins   Prone over pillow to elbow press 10x10    SL hip flexor stretch 30 sec 3x LLE   Supine manual ROM B hips 5 mins   THERE EX: 15 mins  Hooklying Hip ADD ball squeeze 3 sec hold 10x3  Self stretch for supine piriformis using UE leverage cross body, Knees crossed, lumbar rotation (ql stretch) 30 secs 3x*  Manual piriformis, glut stretch 30 secs 4x RLE THERE EX: 8 mins  Self stretch review with OP   Manual piriformis, glut stretch 60 secs 4x RLE1 THERE EX: 4 mins    Manual piriformis, glut stretch 60 secs 4x RLE THERE EX: 8 min    Manual piriformis, glut stretch 60 secs 4x each LE   MANUAL: 21 mins    Prone PA SI and TP L side grade 3-4 6 mins  Prone over pillow STM piriforms lumbar paraspinals, coccygeus and glut 15 mins effleurage MANUAL: 33 mins  Prone PA SI and SP L side grade 3-4 6 mins  Prone over pillow STM piriforms lumbar paraspinals, coccygeus and glut 18 mins effleurage Restriction noted in L glut  MET for L on R for L Si jt pain, 10 sec 5x  MANUAL:22 mins  Prone over pillow STM piriforms lumbar paraspinals, coccygeus, multifidi and glut 20 mins effleurage Restriction noted in L glut  MET for L on R for L Si jt pain, 10 sec 5x  MANUAL:20 mins  Prone over pillow STM piriforms lumbar paraspinals, coccygeus, multifidi and glut 20 mins effleurage Restriction noted in L glut   MANUAL:25 mins  Prone over pillow STM piriforms lumbar paraspinals, coccygeus, multifidi and glut, to c-and T paraspinals 25 mins  effleurage Restriction noted in L glut  MANUAL:25 mins  Prone over pillow STM piriforms lumbar paraspinals, coccygeus, multifidi and glut, to c-and T paraspinals 25 mins effleurage Restriction noted in L glut      MODALITIES  US 3.3 W/cm2 1.2 MHz CV head warming on, prone, R multidus near SI jt 8 mins MODALITIES  US 3.3 W/cm2 1.4 MHz CV head warming on, prone, R  and L multidus near SI jt 8 mins MODALITIES  US 3.3 W/cm2 1.4 MHz CV head warming on, prone, R  and L multidus near SI jt 8 mins       NEURO RE ED: 6 mins  TA foam roll resisted push downs 10x2 3 sec hold mod cues for breath support     HEP: Denoted with *  Access Code: [email or web address]  URL: [email or web address]  Date: 11/13/2023  Prepared by: Elsa Rosario     Exercises  Access Code: [email or web address]  URL: [email or web address]  Date: 12/11/2023  Prepared by: Elsa Rosario    Exercises  - Prone Press Up On Elbows  - 1 x daily - 7 x weekly - 3 sets - 5 reps - 1 min hold  - Standing Lumbar Extension at Wall - Forearms  - 1 x daily - 7 x weekly - 3 sets - 10 reps - 5 secs hold  - Supine Figure 4 Piriformis Stretch  - 1 x daily - 7 x weekly - 3 sets - 1 reps - 30 sec hold  - Supine Piriformis Stretch with Foot on Ground  - 1 x daily - 7 x weekly - 3 sets - 1 reps - 30 secs hold  - Supine Piriformis Stretch Pulling Heel to Hip  - 1 x daily - 7 x weekly - 3 sets - 1 reps - 30 hold  - Supine Transversus Abdominis Bracing - Hands on Stomach  - 1 x daily - 7 x weekly - 2 sets - 10 reps - 5 sec hold  - Sidelying Clamshell in Neutral  - 1 x daily - 7 x weekly - 3 sets - 10 reps  - Hooklying Clamshell with Resistance  - 1 x daily - 4 x weekly - 2 sets - 15 reps  - Seated Table Piriformis Stretch  - 1 x daily - 7 x weekly - 3 sets - 1 reps - 30 sec hold  Access Code: [email or web address]  URL: [email or web address]  Date: 12/29/2023  Prepared by: Elsa Rosario    Exercises  - Supine Piriformis Stretch  - 1 x daily - 7 x weekly - 3 sets - 1 reps - 30 hold  - Supine  Piriformis Stretch Pulling Heel to Hip  - 1 x daily - 7 x weekly - 3 sets - 1 reps - 30 hold  - Supine Quadratus Lumborum Stretch  - 1 x daily - 7 x weekly - 3 sets - 1 reps - 30 hold  Charges: 2 manual  1 ther ex Total Timed Treatment: 33 min  Total Treatment Time: 40 min

## 2024-02-09 ENCOUNTER — OFFICE VISIT (OUTPATIENT)
Dept: PHYSICAL THERAPY | Age: 69
End: 2024-02-09
Attending: INTERNAL MEDICINE
Payer: MEDICARE

## 2024-02-09 PROCEDURE — 97110 THERAPEUTIC EXERCISES: CPT

## 2024-02-09 PROCEDURE — 97140 MANUAL THERAPY 1/> REGIONS: CPT

## 2024-02-09 NOTE — PROGRESS NOTES
Diagnosis:   Lumbar pain (M54.50)       Referring Provider: Naresh  Date of Evaluation:    11/13/23    Precautions:   Seizure Next MD visit:   none scheduled  Date of Surgery: n/a    Progress Summary  Pt has attended 10 visits in Physical Therapy.   Insurance Primary/Secondary: MEDICARE / [employer] [place] INDEMNITY     # Auth Visits: Medical necessity, progress notes every 10 visits            Subjective:  Pt reports she is feeling better, watching grandson's compete this weekend but will have a bleacher seat with a back.    Pain: 5/10      Objective  12/29/23- Deep knotty restriction in R mutlifidi  11/20/23- Potential bursitis L greater troch  11/17/23- SIJ torsion L side posterior TTP and tight pirifomris and coccygeus   All of the below objective measures are from evaluation for reference:  Lumbar AROM: (* denotes performed with pain)  Flexion: 60  Extension: 8 deg   Sidebending: R 44; L 52 cm Finger tip to floor    Assessment: Improving soft tissue extensibility. Demos ability to recruit TA without compensation, sciatic nerve tension resolved.  Continue 2  additional visits to progress soft tissue length, transverse abdominis and glut strength and firm up HEP.     Goals:   (to be met in 12 visits)   Pt will improve transversus abdominis recruitment to perform proper isometric contraction without requiring verbal or tactile cuing to promote advancement of therex  2/9/24- GOAL MET  Pt will demonstrate good understanding of proper posture and body mechanics to decrease pain and improve spinal safety 2/9/24- GOAL MET  Pt will improve lumbar spine AROM SB to<48 cm Finger tip to floor to allow increase ease with bending forward to don shoes   Pt will report improved symptom centralization and absence of radicular symptoms for 3 consecutive days to improve function with ADL 2/9/24- GOAL MET  Pt will have decreased paraspinal mm tension to tolerate walking >10 minutes for work and home activities   Pt will demonstrate improved  core strength to be able to perform modified dead bug with <6/10 pain   Pt will be independent and compliant with comprehensive HEP to maintain progress achieved in PT      Plan:Continue 2 additional visits to progress soft tissue length, transverse abdominis and glut strength and firm up HEP.   Date: 12/26/23                TX#: 5/10 Date: 12/29/23   Tx#: 6/10 Date: 1/15/24  TX#: 7/10 Date: 1/18/24  TX#: 8/10 Date: 2/6/24  TX#: 9/10 Date: 2/9/24  TX#: 10/10   THERE EX: 10 mins   Prone over pillow to elbow press 10x10    SL hip flexor stretch 30 sec 3x LLE   Supine manual ROM B hips 5 mins   THERE EX: 15 mins  Hooklying Hip ADD ball squeeze 3 sec hold 10x3  Self stretch for supine piriformis using UE leverage cross body, Knees crossed, lumbar rotation (ql stretch) 30 secs 3x*  Manual piriformis, glut stretch 30 secs 4x RLE THERE EX: 8 mins  Self stretch review with OP   Manual piriformis, glut stretch 60 secs 4x RLE1 THERE EX: 4 mins    Manual piriformis, glut stretch 60 secs 4x RLE THERE EX: 8 min    Manual piriformis, glut stretch 60 secs 4x each LE THERE EX: 10 min    Manual piriformis, glut stretch 60 secs 4x each LE  Hook lying TA brace with hip ADD 10x2 3 sec   MANUAL: 33 mins  Prone PA SI and SP L side grade 3-4 6 mins  Prone over pillow STM piriforms lumbar paraspinals, coccygeus and glut 18 mins effleurage Restriction noted in L glut  MET for L on R for L Si jt pain, 10 sec 5x  MANUAL:22 mins  Prone over pillow STM piriforms lumbar paraspinals, coccygeus, multifidi and glut 20 mins effleurage Restriction noted in L glut  MET for L on R for L Si jt pain, 10 sec 5x  MANUAL:20 mins  Prone over pillow STM piriforms lumbar paraspinals, coccygeus, multifidi and glut 20 mins effleurage Restriction noted in L glut   MANUAL:25 mins  Prone over pillow STM piriforms lumbar paraspinals, coccygeus, multifidi and glut, to c-and T paraspinals 25 mins effleurage Restriction noted in L glut  MANUAL:25 mins  Prone over  pillow STM piriforms lumbar paraspinals, coccygeus, multifidi and glut, to c-and T paraspinals 25 mins effleurage Restriction noted in L glut  MANUAL:25 mins  Prone over pillow STM piriforms lumbar paraspinals, coccygeus, multifidi and glut, to c-and T paraspinals 25 mins effleurage Restriction noted in L glut     MODALITIES  US 3.3 W/cm2 1.2 MHz CV head warming on, prone, R multidus near SI jt 8 mins MODALITIES  US 3.3 W/cm2 1.4 MHz CV head warming on, prone, R  and L multidus near SI jt 8 mins MODALITIES  US 3.3 W/cm2 1.4 MHz CV head warming on, prone, R  and L multidus near SI jt 8 mins  MODALITIES  US 3.3 W/cm2 1.4 MHz CV head warming on, prone, R  and L multidus near SI jt 8 mins     NEURO RE ED: 6 mins  TA foam roll resisted push downs 10x2 3 sec hold mod cues for breath support      HEP: Denoted with *  Access Code: [email or web address]  URL: [email or web address]  Date: 11/13/2023  Prepared by: Elsa Rosario     Exercises  Access Code: [email or web address]  URL: [email or web address]  Date: 12/11/2023  Prepared by: Elsa Rosario    Exercises  - Prone Press Up On Elbows  - 1 x daily - 7 x weekly - 3 sets - 5 reps - 1 min hold  - Standing Lumbar Extension at Wall - Forearms  - 1 x daily - 7 x weekly - 3 sets - 10 reps - 5 secs hold  - Supine Figure 4 Piriformis Stretch  - 1 x daily - 7 x weekly - 3 sets - 1 reps - 30 sec hold  - Supine Piriformis Stretch with Foot on Ground  - 1 x daily - 7 x weekly - 3 sets - 1 reps - 30 secs hold  - Supine Piriformis Stretch Pulling Heel to Hip  - 1 x daily - 7 x weekly - 3 sets - 1 reps - 30 hold  - Supine Transversus Abdominis Bracing - Hands on Stomach  - 1 x daily - 7 x weekly - 2 sets - 10 reps - 5 sec hold  - Sidelying Clamshell in Neutral  - 1 x daily - 7 x weekly - 3 sets - 10 reps  - Hooklying Clamshell with Resistance  - 1 x daily - 4 x weekly - 2 sets - 15 reps  - Seated Table Piriformis Stretch  - 1 x daily - 7 x weekly - 3 sets - 1 reps - 30 sec hold  Access Code:  [email or web address]  URL: [email or web address]  Date: 12/29/2023  Prepared by: Elsa Rosario    Exercises  - Supine Piriformis Stretch  - 1 x daily - 7 x weekly - 3 sets - 1 reps - 30 hold  - Supine Piriformis Stretch Pulling Heel to Hip  - 1 x daily - 7 x weekly - 3 sets - 1 reps - 30 hold  - Supine Quadratus Lumborum Stretch  - 1 x daily - 7 x weekly - 3 sets - 1 reps - 30 hold  Charges: 2 manual  1 ther ex Total Timed Treatment: 43 min  Total Treatment Time: 50 min    Plan: Continue skilled Physical Therapy 1-2 x/week or a total of 2 visits over a 90 day period. Treatment will include: Manual Therapy; Therapeutic Exercises; Neuromuscular Re-education; Therapeutic Activity; Electrical Stim; Ultrasound; Pt education; Home exercise program instructions;        Patient/Family/Caregiver was advised of these findings, precautions, and treatment options and has agreed to actively participate in planning and for this course of care.    Thank you for your referral. If you have any questions, please contact me at Dept: [phone].    Sincerely,  Electronically signed by therapist: Suki Rosario PT, DPT    Physician's certification required:  Yes  Please co-sign or sign and return this letter via fax as soon as possible to [phone].   I certify the need for these services furnished under this plan of treatment and while under my care.    X___________________________________________________ Date____________________    Certification From: 2/9/2024  To:5/9/2024

## 2024-02-13 ENCOUNTER — OFFICE VISIT (OUTPATIENT)
Dept: PHYSICAL THERAPY | Age: 69
End: 2024-02-13
Attending: INTERNAL MEDICINE
Payer: MEDICARE

## 2024-02-13 PROCEDURE — 97110 THERAPEUTIC EXERCISES: CPT

## 2024-02-13 PROCEDURE — 97112 NEUROMUSCULAR REEDUCATION: CPT

## 2024-02-13 PROCEDURE — 97140 MANUAL THERAPY 1/> REGIONS: CPT

## 2024-02-16 ENCOUNTER — APPOINTMENT (OUTPATIENT)
Dept: PHYSICAL THERAPY | Age: 69
End: 2024-02-16
Attending: INTERNAL MEDICINE
Payer: MEDICARE

## 2024-02-20 ENCOUNTER — OFFICE VISIT (OUTPATIENT)
Dept: PHYSICAL THERAPY | Age: 69
End: 2024-02-20
Attending: INTERNAL MEDICINE
Payer: MEDICARE

## 2024-02-20 PROCEDURE — 97112 NEUROMUSCULAR REEDUCATION: CPT

## 2024-02-20 PROCEDURE — 97110 THERAPEUTIC EXERCISES: CPT

## 2024-02-20 PROCEDURE — 97140 MANUAL THERAPY 1/> REGIONS: CPT

## 2024-02-20 NOTE — PROGRESS NOTES
Discharge Summary  Pt has attended 12 visits in Physical Therapy.   Diagnosis:   Lumbar pain (M54.50)       Referring Provider: Naresh  Date of Evaluation:    11/13/23    Precautions:   Seizure Next MD visit:   none scheduled  Date of Surgery: n/a     Insurance Primary/Secondary: MEDICARE / [employer] [place] INDEMNITY     # Auth Visits: Medical necessity, progress notes every 10 visits            Subjective:  Pt states she is feeling good, minimal back pain.    Pain: 2/10      Objective  12/29/23- Deep knotty restriction in R mutlifidi  11/20/23- Potential bursitis L greater troch  11/17/23- SIJ torsion L side posterior TTP and tight pirifomris and coccygeus   All of the below objective measures are from evaluation for reference:  Lumbar AROM: (* denotes performed with pain)  Flexion: 60  Extension: 8 deg   Sidebending: R 44; L 52 cm Finger tip to floor    Assessment:  Pt to be d/c from skilled 2/2 meeting 4/5 goals.  Pt has improved functional strength and spinal stability as evidenced by return to ADLs without increased pain, ability to stand and walk to participate in grandchildren's sporting events and community activities. Pt has HEP to maintain/progress strength, ROM, independently.       Goals:   (to be met in 12 visits)   Pt will improve transversus abdominis recruitment to perform proper isometric contraction without requiring verbal or tactile cuing to promote advancement of therex  2/9/24- GOAL MET  Pt will demonstrate good understanding of proper posture and body mechanics to decrease pain and improve spinal safety 2/9/24- GOAL MET  Pt will improve lumbar spine AROM SB to<48 cm Finger tip to floor to allow increase ease with bending forward to don shoes   Pt will report improved symptom centralization and absence of radicular symptoms for 3 consecutive days to improve function with ADL 2/9/24- GOAL MET  Pt will have decreased paraspinal mm tension to tolerate walking >10 minutes for work and home activities   2/20/24- GOAL MET  Pt will demonstrate improved core strength to be able to perform modified dead bug with <6/10 pain 2/20/24- NT  Pt will be independent and compliant with comprehensive HEP to maintain progress achieved in PT  2/20/24- GOAL MET    Plan:d/c   Date: 1/15/24  TX#: 7/10 Date: 1/18/24  TX#: 8/10 Date: 2/6/24  TX#: 9/10 Date: 2/9/24  TX#: 10/10 Date: 2/13/24  TX#: 11/12 Date: 2/20/24  TX#:12/12   THERE EX: 8 mins  Self stretch review with OP   Manual piriformis, glut stretch 60 secs 4x RLE1 THERE EX: 4 mins    Manual piriformis, glut stretch 60 secs 4x RLE THERE EX: 8 min    Manual piriformis, glut stretch 60 secs 4x each LE THERE EX: 10 min    Manual piriformis, glut stretch 60 secs 4x each LE  Hook lying TA brace with hip ADD 10x2 3 sec THERE EX: 10 min    Manual piriformis, glut stretch 60 secs 4x each LE  Hook lying TA brace with hip ADD and PPT 10x2 5 sec hold THERE EX: 10 min  Manual piriformis, glut stretch 60 secs 4x each LE  Hook lying TA brace with hip ADD and PPT 10x2 5 sec hold   MANUAL:20 mins  Prone over pillow STM piriforms lumbar paraspinals, coccygeus, multifidi and glut 20 mins effleurage Restriction noted in L glut   MANUAL:25 mins  Prone over pillow STM piriforms lumbar paraspinals, coccygeus, multifidi and glut, to c-and T paraspinals 25 mins effleurage Restriction noted in L glut  MANUAL:25 mins  Prone over pillow STM piriforms lumbar paraspinals, coccygeus, multifidi and glut, to c-and T paraspinals 25 mins effleurage Restriction noted in L glut  MANUAL:25 mins  Prone over pillow STM piriforms lumbar paraspinals, coccygeus, multifidi and glut, to c-and T paraspinals 25 mins effleurage Restriction noted in L glut  MANUAL:20 mins   Prone over pillow STM piriforms lumbar paraspinals, coccygeus, multifidi and glut, to L and T paraspinals 25 mins effleurage MANUAL:20 mins   Prone over pillow STM piriforms lumbar paraspinals, coccygeus, multifidi and glut, to L and T paraspinals 25 mins  effleurage   MODALITIES  US 3.3 W/cm2 1.4 MHz CV head warming on, prone, R  and L multidus near SI jt 8 mins MODALITIES  US 3.3 W/cm2 1.4 MHz CV head warming on, prone, R  and L multidus near SI jt 8 mins  MODALITIES  US 3.3 W/cm2 1.4 MHz CV head warming on, prone, R  and L multidus near SI jt 8 mins  MODALITIES  US 3.3 W/cm2 1.4 MHz CV head warming on, prone, R  and L multidus near SI jt 8 mins   NEURO RE ED: 6 mins  TA foam roll resisted push downs 10x2 3 sec hold mod cues for breath support    NEURO RE ED: 15 mins  TA foam roll resisted push downs 10x2 5 sec hold mod cues for breath support  BKFO 10x ea mod cues for set up* NEURO RE ED:  BKFO 10x ea mod cues for set up*  HEP review   HEP: Denoted with *  Access Code: [email or web address]  URL: [email or web address]  Date: 11/13/2023  Prepared by: Elsa Rosario     Exercises  Access Code: [email or web address]  URL: [email or web address]  Date: 12/11/2023  Prepared by: Elsa Rosario    Exercises  - Prone Press Up On Elbows  - 1 x daily - 7 x weekly - 3 sets - 5 reps - 1 min hold  - Standing Lumbar Extension at Wall - Forearms  - 1 x daily - 7 x weekly - 3 sets - 10 reps - 5 secs hold  - Supine Figure 4 Piriformis Stretch  - 1 x daily - 7 x weekly - 3 sets - 1 reps - 30 sec hold  - Supine Piriformis Stretch with Foot on Ground  - 1 x daily - 7 x weekly - 3 sets - 1 reps - 30 secs hold  - Supine Piriformis Stretch Pulling Heel to Hip  - 1 x daily - 7 x weekly - 3 sets - 1 reps - 30 hold  - Supine Transversus Abdominis Bracing - Hands on Stomach  - 1 x daily - 7 x weekly - 2 sets - 10 reps - 5 sec hold  - Sidelying Clamshell in Neutral  - 1 x daily - 7 x weekly - 3 sets - 10 reps  - Hooklying Clamshell with Resistance  - 1 x daily - 4 x weekly - 2 sets - 15 reps  - Seated Table Piriformis Stretch  - 1 x daily - 7 x weekly - 3 sets - 1 reps - 30 sec hold  Access Code: [email or web address]  URL: [email or web address]  Date: 12/29/2023  Prepared by: Elsa Hunter  Abraham    Exercises  - Supine Piriformis Stretch  - 1 x daily - 7 x weekly - 3 sets - 1 reps - 30 hold  - Supine Piriformis Stretch Pulling Heel to Hip  - 1 x daily - 7 x weekly - 3 sets - 1 reps - 30 hold  - Supine Quadratus Lumborum Stretch  - 1 x daily - 7 x weekly - 3 sets - 1 reps - 30 hold   Bent Knee Fallouts with Alternating Legs  - 1 x daily - 4 x weekly - 2 sets - 10 reps - 3 sec hold  Charges: 1 manual  1 ther ex 1 neuro re ed Total Timed Treatment: 45 min  Total Treatment Time: 45 min  Plan: Discontinue skilled Physical Therapy   Patient/Family/Caregiver was advised of these findings, precautions, and treatment options and has agreed to actively participate in planning and for this course of care.    Thank you for your referral. If you have any questions, please contact me at Dept: [phone].    Sincerely,  Electronically signed by therapist: Suki Rosario, PT, DPT    Physician's certification required:  No  Please co-sign or sign and return this letter via fax as soon as possible to [phone].   I certify the need for these services furnished under this plan of treatment and while under my care.    X___________________________________________________ Date____________________    Certification From: 2/20/2024  To:5/20/2024

## 2024-03-20 DIAGNOSIS — I10 ESSENTIAL HYPERTENSION, BENIGN: ICD-10-CM

## 2024-03-21 ENCOUNTER — TELEPHONE (OUTPATIENT)
Dept: FAMILY MEDICINE CLINIC | Facility: CLINIC | Age: 69
End: 2024-03-21

## 2024-03-21 DIAGNOSIS — E11.9 TYPE 2 DIABETES MELLITUS WITHOUT COMPLICATION, WITHOUT LONG-TERM CURRENT USE OF INSULIN (HCC): Primary | ICD-10-CM

## 2024-03-21 DIAGNOSIS — I10 ESSENTIAL HYPERTENSION, BENIGN: ICD-10-CM

## 2024-03-21 RX ORDER — AMLODIPINE BESYLATE 10 MG/1
10 TABLET ORAL DAILY
Qty: 90 TABLET | Refills: 0 | Status: SHIPPED | OUTPATIENT
Start: 2024-03-21

## 2024-03-21 NOTE — TELEPHONE ENCOUNTER
AMLODIPINE 10 MG Oral Tab     Hypertension Medications Protocol [id number]/20/2024 10:17 AM   Protocol Details CMP or BMP in past 12 months    Last BP reading less than 140/90    In person appointment or virtual visit in the past 12 mos or appointment in next 3 mos    EGFRCR or GFRNAA > 50      Last office visit:  7/29/23  No future appointments.  Last filled:   12/29/23  #90 with 0 refills   Last labs:  7/27/23    Last BP:  132/80

## 2024-05-03 RX ORDER — MONTELUKAST SODIUM 10 MG/1
10 TABLET ORAL DAILY
Qty: 90 TABLET | Refills: 0 | Status: SHIPPED | OUTPATIENT
Start: 2024-05-03

## 2024-05-03 NOTE — TELEPHONE ENCOUNTER
MONTELUKAST 10 MG Oral Tab     Asthma & COPD Medication Protocol [place]2405/02/2024 10:21 AM   Protocol Details Asthma Action Score greater than or equal to 20    AAP/ACT given in last 12 months    Appointment in past 6 or next 3 months      Last office visit:  2/2/23  Future Appointments   Date Time Provider Department Center   5/21/2024  9:00 AM REF [facility]F [facility]   Last filled:  2/6/24  #90 with 0 refills   Last labs:  DUE for labs 3/21/24

## 2024-05-07 DIAGNOSIS — I10 ESSENTIAL HYPERTENSION, BENIGN: ICD-10-CM

## 2024-05-08 RX ORDER — METOPROLOL SUCCINATE 50 MG/1
50 TABLET, EXTENDED RELEASE ORAL DAILY
Qty: 90 TABLET | Refills: 1 | Status: SHIPPED | OUTPATIENT
Start: 2024-05-08

## 2024-05-08 NOTE — TELEPHONE ENCOUNTER
Last office visit:1/4/24   Future Appointments   Date Time Provider Department Center   5/21/2024  9:00 AM REF [facility]   Last filled:11/10/23 #90 with 1 RF  Last labs: 7/6/23     Protocol:  Hypertension Medications Protocol Hcjqjg0605/07/2024 03:20 PM   Protocol Details CMP or BMP in past 12 months    Last BP reading less than 140/90    In person appointment or virtual visit in the past 12 mos or appointment in next 3 mos    EGFRCR or GFRNAA > 50

## 2024-05-14 DIAGNOSIS — F41.9 ANXIETY: ICD-10-CM

## 2024-05-15 RX ORDER — ALPRAZOLAM 0.25 MG/1
0.25 TABLET ORAL EVERY 4 HOURS PRN
Qty: 30 TABLET | Refills: 0 | Status: SHIPPED | OUTPATIENT
Start: 2024-05-15

## 2024-05-15 NOTE — TELEPHONE ENCOUNTER
Last office visit: 1/4/24  Last refill: 2/6/24  Labs Due: 3/21/24  Future Appointments   Date Time Provider Department Center   5/21/2024  9:00 AM REF [facility]   7/8/2024  1:15 PM Rylee Infante MD [facility] [place]

## 2024-05-21 ENCOUNTER — OFFICE VISIT (OUTPATIENT)
Dept: FAMILY MEDICINE CLINIC | Facility: CLINIC | Age: 69
End: 2024-05-21

## 2024-05-21 ENCOUNTER — LABORATORY ENCOUNTER (OUTPATIENT)
Dept: LAB | Age: 69
End: 2024-05-21
Attending: INTERNAL MEDICINE

## 2024-05-21 VITALS
RESPIRATION RATE: 18 BRPM | TEMPERATURE: 100 F | OXYGEN SATURATION: 98 % | SYSTOLIC BLOOD PRESSURE: 132 MMHG | DIASTOLIC BLOOD PRESSURE: 80 MMHG | HEART RATE: 72 BPM

## 2024-05-21 DIAGNOSIS — E66.01 MORBID (SEVERE) OBESITY DUE TO EXCESS CALORIES (HCC): ICD-10-CM

## 2024-05-21 DIAGNOSIS — E11.9 TYPE 2 DIABETES MELLITUS WITHOUT COMPLICATION, WITHOUT LONG-TERM CURRENT USE OF INSULIN (HCC): ICD-10-CM

## 2024-05-21 DIAGNOSIS — E11.9 TYPE 2 DIABETES MELLITUS WITHOUT COMPLICATION, WITHOUT LONG-TERM CURRENT USE OF INSULIN (HCC): Primary | ICD-10-CM

## 2024-05-21 DIAGNOSIS — I10 ESSENTIAL HYPERTENSION, BENIGN: ICD-10-CM

## 2024-05-21 LAB
ALBUMIN SERPL-MCNC: 3.7 G/DL (ref 3.4–5)
ALBUMIN/GLOB SERPL: 1.1 {RATIO} (ref 1–2)
ALP LIVER SERPL-CCNC: 76 U/L
ALT SERPL-CCNC: 22 U/L
ANION GAP SERPL CALC-SCNC: 6 MMOL/L (ref 0–18)
AST SERPL-CCNC: 17 U/L (ref 15–37)
BASOPHILS # BLD AUTO: 0.09 X10(3) UL (ref 0–0.2)
BASOPHILS NFR BLD AUTO: 1.3 %
BILIRUB SERPL-MCNC: 0.5 MG/DL (ref 0.1–2)
BUN BLD-MCNC: 9 MG/DL (ref 9–23)
CALCIUM BLD-MCNC: 9.1 MG/DL (ref 8.5–10.1)
CHLORIDE SERPL-SCNC: 104 MMOL/L (ref 98–112)
CHOLEST SERPL-MCNC: 207 MG/DL (ref ?–200)
CO2 SERPL-SCNC: 28 MMOL/L (ref 21–32)
CREAT BLD-MCNC: 0.84 MG/DL
CREAT UR-SCNC: 299 MG/DL
EGFRCR SERPLBLD CKD-EPI 2021: 77 ML/MIN/1.73M2 (ref 60–?)
EOSINOPHIL # BLD AUTO: 0.11 X10(3) UL (ref 0–0.7)
EOSINOPHIL NFR BLD AUTO: 1.6 %
ERYTHROCYTE [DISTWIDTH] IN BLOOD BY AUTOMATED COUNT: 13 %
EST. AVERAGE GLUCOSE BLD GHB EST-MCNC: 140 MG/DL (ref 68–126)
FASTING PATIENT LIPID ANSWER: YES
FASTING STATUS PATIENT QL REPORTED: YES
GLOBULIN PLAS-MCNC: 3.3 G/DL (ref 2.8–4.4)
GLUCOSE BLD-MCNC: 132 MG/DL (ref 70–99)
HBA1C MFR BLD: 6.5 % (ref ?–5.7)
HCT VFR BLD AUTO: 36.8 %
HDLC SERPL-MCNC: 36 MG/DL (ref 40–59)
HGB BLD-MCNC: 12.2 G/DL
IMM GRANULOCYTES # BLD AUTO: 0.02 X10(3) UL (ref 0–1)
IMM GRANULOCYTES NFR BLD: 0.3 %
LDLC SERPL CALC-MCNC: 105 MG/DL (ref ?–100)
LYMPHOCYTES # BLD AUTO: 1.46 X10(3) UL (ref 1–4)
LYMPHOCYTES NFR BLD AUTO: 21.9 %
MCH RBC QN AUTO: 30.3 PG (ref 26–34)
MCHC RBC AUTO-ENTMCNC: 33.2 G/DL (ref 31–37)
MCV RBC AUTO: 91.3 FL
MICROALBUMIN UR-MCNC: 2.09 MG/DL
MICROALBUMIN/CREAT 24H UR-RTO: 7 UG/MG (ref ?–30)
MONOCYTES # BLD AUTO: 0.34 X10(3) UL (ref 0.1–1)
MONOCYTES NFR BLD AUTO: 5.1 %
NEUTROPHILS # BLD AUTO: 4.65 X10 (3) UL (ref 1.5–7.7)
NEUTROPHILS # BLD AUTO: 4.65 X10(3) UL (ref 1.5–7.7)
NEUTROPHILS NFR BLD AUTO: 69.8 %
NONHDLC SERPL-MCNC: 171 MG/DL (ref ?–130)
OSMOLALITY SERPL CALC.SUM OF ELEC: 287 MOSM/KG (ref 275–295)
PLATELET # BLD AUTO: 126 10(3)UL (ref 150–450)
POTASSIUM SERPL-SCNC: 4 MMOL/L (ref 3.5–5.1)
PROT SERPL-MCNC: 7 G/DL (ref 6.4–8.2)
RBC # BLD AUTO: 4.03 X10(6)UL
SODIUM SERPL-SCNC: 138 MMOL/L (ref 136–145)
TRIGL SERPL-MCNC: 387 MG/DL (ref 30–149)
VLDLC SERPL CALC-MCNC: 67 MG/DL (ref 0–30)
WBC # BLD AUTO: 6.7 X10(3) UL (ref 4–11)

## 2024-05-21 PROCEDURE — 83036 HEMOGLOBIN GLYCOSYLATED A1C: CPT

## 2024-05-21 PROCEDURE — 36415 COLL VENOUS BLD VENIPUNCTURE: CPT

## 2024-05-21 PROCEDURE — 99214 OFFICE O/P EST MOD 30 MIN: CPT | Performed by: INTERNAL MEDICINE

## 2024-05-21 PROCEDURE — 82570 ASSAY OF URINE CREATININE: CPT

## 2024-05-21 PROCEDURE — 80061 LIPID PANEL: CPT

## 2024-05-21 PROCEDURE — 82043 UR ALBUMIN QUANTITATIVE: CPT

## 2024-05-21 PROCEDURE — G2211 COMPLEX E/M VISIT ADD ON: HCPCS | Performed by: INTERNAL MEDICINE

## 2024-05-21 PROCEDURE — 85025 COMPLETE CBC W/AUTO DIFF WBC: CPT

## 2024-05-21 PROCEDURE — 80053 COMPREHEN METABOLIC PANEL: CPT

## 2024-05-21 NOTE — PROGRESS NOTES
Sarita Robertson is a 68 year old female.  HPI:   Pt has been having pain in the hands and stiffness in the morning.  She has not had her blood sugar checking in some time.  She has normal feet and needs eye exam.  She has lost both her mom and dad in the last year and moved to a new home herself.   Current Outpatient Medications   Medication Sig Dispense Refill    ALPRAZOLAM 0.25 MG Oral Tab TAKE 1 TABLET(0.25 MG) BY MOUTH EVERY 4 HOURS AS NEEDED FOR ANXIETY 30 tablet 0    METOPROLOL SUCCINATE ER 50 MG Oral Tablet 24 Hr TAKE 1 TABLET(50 MG) BY MOUTH DAILY 90 tablet 1    montelukast 10 MG Oral Tab Take 1 tablet (10 mg total) by mouth daily. 90 tablet 0    AMLODIPINE 10 MG Oral Tab TAKE 1 TABLET(10 MG) BY MOUTH DAILY 90 tablet 0    lisinopril 40 MG Oral Tab Take 1 tablet (40 mg total) by mouth daily. 90 tablet 1    Fluticasone Propionate  MCG/ACT Inhalation Aerosol Inhale 2 puffs into the lungs Q12H.      oseltamivir 75 MG Oral Cap Take 1 capsule (75 mg total) by mouth 2 (two) times daily.      predniSONE 20 MG Oral Tab Take 2 tablets (40 mg total) by mouth daily.      clonazePAM 0.5 MG Oral Tab Take 1 tablet (0.5 mg total) by mouth 2 (two) times daily as needed (for seizure, then call [phone]). 10 tablet 0    Budesonide-Formoterol Fumarate 80-4.5 MCG/ACT Inhalation Aerosol Inhale 2 puffs into the lungs 2 (two) times daily. 10.2 g 0    levetiracetam 750 MG Oral Tab Take 1 tablet (750 mg total) by mouth 2 (two) times daily. 180 tablet 3    MAGNESIUM OR Take by mouth.      acetaminophen 325 MG Oral Tab Take 2 tablets (650 mg total) by mouth every 6 (six) hours as needed.      saccharomyces boulardii (FLORASTOR) 250 MG Oral Cap Take 1 capsule (250 mg total) by mouth 2 (two) times daily. 60 capsule 11      Past Medical History:    Allergic rhinitis, cause unspecified    Anxiety    Back pain    Belching    Bleeding nose    Blood disorder    ITP    Blood in urine    Constipation    Decorative tattoo    Diarrhea,  unspecified    Dysphagia    Easy bruising    Esophageal reflux    Essential hypertension, benign    Flatulence/gas pain/belching    High blood pressure    High cholesterol    History of blood transfusion    2017 last tranfusion    History of depression    Irregular bowel habits    MS (multiple sclerosis) (HCC)    diagnosed 30 years ago    Obesity, unspecified    Osteoarthrosis, unspecified whether generalized or localized, unspecified site    Other and unspecified hyperlipidemia    Pain in joint, lower leg    Pain in joints    Postmenopausal atrophic vaginitis    Pure hypercholesterolemia    Seizure disorder (HCC)    last episode Sept 14, 2015    Stress    Unspecified asthma(493.90)    Unspecified essential hypertension    Unspecified hypothyroidism    Urge incontinence    Uterovaginal prolapse, incomplete    Visual impairment    glasses      Social History:  Social History     Socioeconomic History    Marital status:     Number of children: 2   Tobacco Use    Smoking status: Never    Smokeless tobacco: Never    Tobacco comments:     Non-smoker   Vaping Use    Vaping status: Never Used   Substance and Sexual Activity    Alcohol use: Not Currently     Comment: Maybe a couple times a year    Drug use: No   Other Topics Concern    Caffeine Concern No    Exercise No    Seat Belt Yes     Comment: 100%     Social Determinants of Health     Food Insecurity: Low Risk  (12/20/2021)    Received from [facility]    Food Insecurity     Have there been times that your food ran out, and you didn't have money to get more?: No     Are there times that you worry that this might happen?: No   Transportation Needs: Low Risk  (12/20/2021)    Received from [facility]    Transportation Needs     Do you have trouble getting transportation to medical appointments?: No   Housing Stability: Low Risk  (12/20/2021)    Received  from [facility]    Housing Stability     Are you concerned about having a safe and reliable place to live?: No        REVIEW OF SYSTEMS:   GENERAL HEALTH: feels well otherwise  SKIN: denies any unusual skin lesions or rashes  RESPIRATORY: denies shortness of breath with exertion  CARDIOVASCULAR: denies chest pain on exertion  GI: denies abdominal pain and denies heartburn  NEURO: denies headaches    EXAM:   /80   Pulse 72   Temp 99.5 °F (37.5 °C) (Temporal)   Resp 18   LMP  (LMP Unknown)   SpO2 98%   GENERAL: well developed, well nourished,in no apparent distress  SKIN: no rashes,no suspicious lesions  HEENT: atraumatic, normocephalic,ears and throat are clear  NECK: supple,no adenopathy,no bruits  LUNGS: clear to auscultation  CARDIO: RRR without murmur  GI: good BS's,no masses, HSM or tenderness  EXTREMITIES: no cyanosis, clubbing or edema    ASSESSMENT AND PLAN:     Encounter Diagnoses   Name Primary?    Type 2 diabetes mellitus without complication, without long-term current use of insulin (HCC) Yes    Morbid (severe) obesity due to excess calories (HCC)    She asked that she get a call about labs and print out and she can pick it up.  A1C is 6.5% and she really wants to work on diet.     No orders of the defined types were placed in this encounter.      Meds & Refills for this Visit:  Requested Prescriptions      No prescriptions requested or ordered in this encounter       Imaging & Consults:  None    Follow up as needed.     The patient indicates understanding of these issues and agrees to the plan.

## 2024-05-28 ENCOUNTER — TELEPHONE (OUTPATIENT)
Dept: FAMILY MEDICINE CLINIC | Facility: CLINIC | Age: 69
End: 2024-05-28

## 2024-05-28 NOTE — TELEPHONE ENCOUNTER
Spoke with patient - given Dr. Infante's notes:    I think this is purely precautionary. Even if it is squamous cell, it does not move fast.      Patient verbalized understanding

## 2024-05-28 NOTE — TELEPHONE ENCOUNTER
Spoke with patient -   Concerned about [facility] lab report - would like a \"simple\" explanation about the results.  Next appointment for a deeper excision is Aug 15.    The specimen consists of a superficial skin biopsy showing a lesion surmounted by keratotic debris with focal parakeratosis. The epithelium demonstrates variable pleomorphism and disarray as well as focal reactive changes. The dermis is mostly absent from the specimen.     The biopsy is too superficial to render a more definitive diagnosis or to rule out a deeper pathological process.  Differential diagnosis includes a squamous cell carcinoma, porocarcinoma or other adnexal tumors.  Complete excision recommended, if clinically indicated.

## 2024-06-18 DIAGNOSIS — I10 ESSENTIAL HYPERTENSION, BENIGN: ICD-10-CM

## 2024-06-18 RX ORDER — AMLODIPINE BESYLATE 10 MG/1
10 TABLET ORAL DAILY
Qty: 90 TABLET | Refills: 0 | Status: SHIPPED | OUTPATIENT
Start: 2024-06-18

## 2024-06-18 NOTE — TELEPHONE ENCOUNTER
Last office visit:7/26/23   Future Appointments   Date Time Provider Department Center   7/8/2024  1:15 PM Rylee Infante MD [facility] [place]   Last filled:3/21/24 #90 with 0 RF   Last labs:5/21/24     Protocol:    Hypertension Medications Protocol [id number]/18/2024 04:48 PM   Protocol Details CMP or BMP in past 12 months    Last BP reading less than 140/90    In person appointment or virtual visit in the past 12 mos or appointment in next 3 mos    EGFRCR or GFRNAA > 50

## 2024-07-08 ENCOUNTER — OFFICE VISIT (OUTPATIENT)
Dept: FAMILY MEDICINE CLINIC | Facility: CLINIC | Age: 69
End: 2024-07-08
Payer: MEDICARE

## 2024-07-08 VITALS
HEIGHT: 62 IN | RESPIRATION RATE: 18 BRPM | SYSTOLIC BLOOD PRESSURE: 128 MMHG | HEART RATE: 64 BPM | BODY MASS INDEX: 42 KG/M2 | OXYGEN SATURATION: 97 % | DIASTOLIC BLOOD PRESSURE: 76 MMHG | TEMPERATURE: 98 F

## 2024-07-08 DIAGNOSIS — I10 ESSENTIAL HYPERTENSION, BENIGN: ICD-10-CM

## 2024-07-08 DIAGNOSIS — Z78.0 POSTMENOPAUSAL: ICD-10-CM

## 2024-07-08 DIAGNOSIS — E66.01 CLASS 3 SEVERE OBESITY DUE TO EXCESS CALORIES WITHOUT SERIOUS COMORBIDITY IN ADULT, UNSPECIFIED BMI (HCC): ICD-10-CM

## 2024-07-08 DIAGNOSIS — E11.9 TYPE 2 DIABETES MELLITUS WITHOUT COMPLICATION, WITHOUT LONG-TERM CURRENT USE OF INSULIN (HCC): ICD-10-CM

## 2024-07-08 DIAGNOSIS — D69.1 PLATELET DYSFUNCTION (HCC): ICD-10-CM

## 2024-07-08 DIAGNOSIS — E78.00 PURE HYPERCHOLESTEROLEMIA: ICD-10-CM

## 2024-07-08 DIAGNOSIS — D69.6 THROMBOCYTOPENIA (HCC): ICD-10-CM

## 2024-07-08 DIAGNOSIS — E66.01 MORBID (SEVERE) OBESITY DUE TO EXCESS CALORIES (HCC): ICD-10-CM

## 2024-07-08 DIAGNOSIS — F41.9 ANXIETY: ICD-10-CM

## 2024-07-08 DIAGNOSIS — G40.802 OTHER EPILEPSY WITHOUT STATUS EPILEPTICUS, NOT INTRACTABLE (HCC): ICD-10-CM

## 2024-07-08 DIAGNOSIS — Z00.00 ENCOUNTER FOR ANNUAL HEALTH EXAMINATION: Primary | ICD-10-CM

## 2024-07-08 RX ORDER — MELOXICAM 15 MG/1
15 TABLET ORAL DAILY
Qty: 90 TABLET | Refills: 3 | Status: SHIPPED | OUTPATIENT
Start: 2024-07-08 | End: 2025-07-03

## 2024-07-08 RX ORDER — ALPRAZOLAM 0.25 MG/1
0.25 TABLET ORAL EVERY 4 HOURS PRN
Qty: 90 TABLET | Refills: 0 | Status: SHIPPED | OUTPATIENT
Start: 2024-07-08 | End: 2024-10-06

## 2024-07-08 NOTE — PROGRESS NOTES
Subjective:   Sarita Robertson is a 68 year old female who presents for a Medicare Subsequent Annual Wellness visit (Pt already had Initial Annual Wellness) and scheduled follow up of multiple significant but stable problems.   Pt has been much better since the meloxicam and the difference in her body is night and day. She would like to continue it if she can.  She is still taking 1/2 xanax at night to try and induce sleep.  She has moved out of her house into a smaller place.     History/Other:   Fall Risk Assessment:   She has been screened for Falls and is low risk.      Cognitive Assessment:   She had a completely normal cognitive assessment - see flowsheet entries     Functional Ability/Status:   Sarita Robertson has a completely normal functional assessment. See flowsheet for details.        Depression Screening (PHQ):       5 minutes spent screening and counseling for depression    Advanced Directives:   She does have a Living Will but we do NOT have it on file in Epic.    She does have a POA but we do NOT have it on file in Epic.    Discussed Advance Care Planning with patient (and family/surrogate if present). Standard forms made available to patient in After Visit Summary.      Patient Active Problem List   Diagnosis    Unspecified asthma    Seasonal allergic rhinitis    Postmenopausal atrophic vaginitis    Uterovaginal prolapse, incomplete    Pure hypercholesterolemia    Unspecified hypothyroidism    Essential hypertension, benign    Obesity, unspecified    Esophageal reflux    Thrombocytopenia (HCC)    Platelet dysfunction (HCC)    White matter abnormality on MRI of brain    Morbid (severe) obesity due to excess calories (HCC)    Other seizures (HCC)    H/O small bowel obstruction    Type 2 diabetes mellitus without complication, without long-term current use of insulin (HCC)     Allergies:  She is allergic to amaryl [glimepiride], niaspan [niacin], penicillins, and sulfa antibiotics.    Current  Medications:  Outpatient Medications Marked as Taking for the 7/8/24 encounter (Office Visit) with Rylee Infante MD   Medication Sig    SITagliptin Phosphate (JANUVIA) 50 MG Oral Tab Take 1 tablet (50 mg total) by mouth daily.    Meloxicam 15 MG Oral Tab Take 1 tablet (15 mg total) by mouth daily.    ALPRAZolam 0.25 MG Oral Tab Take 1 tablet (0.25 mg total) by mouth every 4 (four) hours as needed for Anxiety.    AMLODIPINE 10 MG Oral Tab TAKE 1 TABLET(10 MG) BY MOUTH DAILY    METOPROLOL SUCCINATE ER 50 MG Oral Tablet 24 Hr TAKE 1 TABLET(50 MG) BY MOUTH DAILY    montelukast 10 MG Oral Tab Take 1 tablet (10 mg total) by mouth daily.    lisinopril 40 MG Oral Tab Take 1 tablet (40 mg total) by mouth daily.    Fluticasone Propionate  MCG/ACT Inhalation Aerosol Inhale 2 puffs into the lungs Q12H.    oseltamivir 75 MG Oral Cap Take 1 capsule (75 mg total) by mouth 2 (two) times daily.    predniSONE 20 MG Oral Tab Take 2 tablets (40 mg total) by mouth daily.    clonazePAM 0.5 MG Oral Tab Take 1 tablet (0.5 mg total) by mouth 2 (two) times daily as needed (for seizure, then call [phone]).    Budesonide-Formoterol Fumarate 80-4.5 MCG/ACT Inhalation Aerosol Inhale 2 puffs into the lungs 2 (two) times daily.    levetiracetam 750 MG Oral Tab Take 1 tablet (750 mg total) by mouth 2 (two) times daily.    MAGNESIUM OR Take by mouth.    acetaminophen 325 MG Oral Tab Take 2 tablets (650 mg total) by mouth every 6 (six) hours as needed.    saccharomyces boulardii (FLORASTOR) 250 MG Oral Cap Take 1 capsule (250 mg total) by mouth 2 (two) times daily.       Medical History:  She  has a past medical history of Allergic rhinitis, cause unspecified (06/27/2007), Anxiety, Back pain, Belching, Bleeding nose, Blood disorder, Blood in urine, Constipation, Decorative tattoo, Diarrhea, unspecified, Dysphagia (07/30/2011), Easy bruising, Esophageal reflux (05/08/2009), Essential hypertension, benign (03/11/2008), Flatulence/gas pain/belching,  High blood pressure, High cholesterol, History of blood transfusion, History of depression, Irregular bowel habits, MS (multiple sclerosis) (Formerly [facility]), Obesity, unspecified (05/27/2008), Osteoarthrosis, unspecified whether generalized or localized, unspecified site (12/27/2011), Other and unspecified hyperlipidemia, Pain in joint, lower leg (12/27/2011), Pain in joints, Postmenopausal atrophic vaginitis (06/27/2007), Pure hypercholesterolemia (10/10/2007), Seizure disorder (HCC), Stress, Unspecified asthma(493.90) (03/23/2007), Unspecified essential hypertension, Unspecified hypothyroidism (10/10/2007), Urge incontinence, Uterovaginal prolapse, incomplete (10/09/2007), and Visual impairment.  Surgical History:  She  has a past surgical history that includes egd (07/01/2016); colonoscopy (07/01/2016); colonoscopy with biopsy (07/01/2016); other surgical history (12/18/2021); bowel resection; and colonoscopy (N/A, 7/19/2023).   Family History:  Her family history includes Arthritis in her mother; Breast Cancer (age of onset: 65) in her maternal aunt; Cancer in her maternal grandfather and maternal grandmother; Colon Cancer in her father and mother; Heart Disorder in her father and mother; Other in her father and mother.  Social History:  She  reports that she has never smoked. She has never used smokeless tobacco. She reports that she does not currently use alcohol. She reports that she does not use drugs.    Tobacco:  She has never smoked tobacco.    CAGE Alcohol Screen:   CAGE screening score of 0 on 7/2/2024, showing low risk of alcohol abuse.      Patient Care Team:  Rylee Infante MD as PCP - General (Family Practice)  Namita Henderson PA (Physician Assistant)  Sabina Rdz, RN  Justino Rojas (HEMATOLOGY)  Rylee Infante MD (Family Medicine)  Suki Rosario, PT as Physical Therapist (Physical Therapy)    Review of Systems  GENERAL: feels well otherwise  SKIN: denies any unusual skin  lesions  EYES: denies blurred vision or double vision  HEENT: denies nasal congestion, sinus pain or ST  LUNGS: denies shortness of breath with exertion  CARDIOVASCULAR: denies chest pain on exertion  GI: denies abdominal pain, denies heartburn  : denies dysuria, vaginal discharge or itching, no complaint of urinary incontinence   MUSCULOSKELETAL: denies back pain  NEURO: denies headaches  PSYCHE: denies depression or anxiety  HEMATOLOGIC: denies hx of anemia  ENDOCRINE: denies thyroid history  ALL/ASTHMA: denies hx of allergy or asthma    Objective:   Physical Exam  General Appearance:  Alert, cooperative, no distress, appears stated age   Head:  Normocephalic, without obvious abnormality, atraumatic   Eyes:  PERRL, conjunctiva/corneas clear, EOM's intact both eyes   Ears:  Normal TM's and external ear canals, both ears   Nose: Nares normal, septum midline,mucosa normal, no drainage or sinus tenderness   Throat: Lips, mucosa, and tongue normal; teeth and gums normal   Neck: Supple, symmetrical, trachea midline, no adenopathy;  thyroid: not enlarged, symmetric, no tenderness/mass/nodules; no carotid bruit or JVD   Back:   Symmetric, no curvature, ROM normal, no CVA tenderness   Lungs:   Clear to auscultation bilaterally, respirations unlabored   Heart:  Regular rate and rhythm, S1 and S2 normal, no murmur, rub, or gallop   Abdomen:   Soft, non-tender, bowel sounds active all four quadrants,  no masses, no organomegaly   Pelvic: Deferred   Extremities: Extremities normal, atraumatic, no cyanosis or edema   Pulses: 2+ and symmetric   Skin: Skin color, texture, turgor normal, no rashes or lesions   Lymph nodes: Cervical, supraclavicular, and axillary nodes normal   Neurologic: Normal       /76   Pulse 64   Temp 97.5 °F (36.4 °C) (Temporal)   Resp 18   Ht 5' 2\" (1.575 m)   LMP  (LMP Unknown)   SpO2 97%   BMI 42.09 kg/m²  Estimated body mass index is 42.09 kg/m² as calculated from the following:    Height  as of this encounter: 5' 2\" (1.575 m).    Weight as of 7/26/23: 230 lb 2 oz (104.4 kg).    Medicare Hearing Assessment:   Hearing Screening    Time taken: 7/8/2024  1:37 PM  Entry User: Rylee Infante MD  Screening Method: Whisper Test  Whisper Test Result: Pass         Visual Acuity:   Right Eye Visual Acuity: Corrected Right Eye Chart Acuity: 20/20   Left Eye Visual Acuity: Corrected Left Eye Chart Acuity: 20/20   Both Eyes Visual Acuity: Corrected Both Eyes Chart Acuity: 20/20   Able To Tolerate Visual Acuity: Yes        Assessment & Plan:   Sarita Robertson is a 68 year old female who presents for a Medicare Assessment.     1. Encounter for annual health examination (Primary)  2. Essential hypertension, benign  3. Type 2 diabetes mellitus without complication, without long-term current use of insulin (HCC)  -     Diabetic Retinopathy Exam; Future; Expected date: 07/08/2024  4. Morbid (severe) obesity due to excess calories (HCC)  5. Anxiety  -     ALPRAZolam; Take 1 tablet (0.25 mg total) by mouth every 4 (four) hours as needed for Anxiety.  Dispense: 90 tablet; Refill: 0  6. Class 3 severe obesity due to excess calories without serious comorbidity in adult, unspecified BMI (HCC)  7. Pure hypercholesterolemia  8. Postmenopausal  9. Other epilepsy without status epilepticus, not intractable (HCC)  10. Thrombocytopenia (HCC)  11. Platelet dysfunction (HCC)  Other orders  -     SITagliptin Phosphate; Take 1 tablet (50 mg total) by mouth daily.  Dispense: 90 tablet; Refill: 3  -     Meloxicam; Take 1 tablet (15 mg total) by mouth daily.  Dispense: 90 tablet; Refill: 3    1. Encounter for annual health examination  done    2. Essential hypertension, benign  Well controlled, CCM    3. Type 2 diabetes mellitus without complication, without long-term current use of insulin (HCC)  Needs to strt Januvia again and discussed diet and exercise  - Diabetic Retinopathy Exam  OU - Both Eyes; Future    4. Morbid (severe)  obesity due to excess calories (HCC)  Discussed diet and exercise and obesity related medical conditions including HTN, hypercholesterolemia, and DM     5. Anxiety  Well controlled, night is challenging since the death of her   - ALPRAZolam 0.25 MG Oral Tab; Take 1 tablet (0.25 mg total) by mouth every 4 (four) hours as needed for Anxiety.  Dispense: 90 tablet; Refill: 0      7. Pure hypercholesterolemia  Well controlled, CCM    8. Postmenopausal  Dexa due     9. Other epilepsy without status epilepticus, not intractable (HCC)  No activity     10. Thrombocytopenia (HCC)/ platelet disfunction  Due to platelet storage disorder. No bleeding or bruising       The patient indicates understanding of these issues and agrees to the plan.  Reinforced healthy diet, lifestyle, and exercise.      No follow-ups on file.     Rylee Infante MD, 7/8/2024     Supplementary Documentation:   General Health:  In the past six months, have you lost more than 10 pounds without trying?: 2 - No  Has your appetite been poor?: No  Type of Diet: Balanced  How does the patient maintain a good energy level?: Daily Walks;Stretching  How would you describe your daily physical activity?: Moderate  How would you describe your current health state?: Fair  How do you maintain positive mental well-being?: Visiting Friends;Visiting Family  On a scale of 0 to 10, with 0 being no pain and 10 being severe pain, what is your pain level?: 3 - (Mild)  In the past six months, have you experienced urine leakage?: 0-No  At any time do you feel concerned for the safety/well-being of yourself and/or your children, in your home or elsewhere?: No  Have you had any immunizations at another office such as Influenza, Hepatitis B, Tetanus, or Pneumococcal?: No    Health Maintenance   Topic Date Due    Diabetes Care Foot Exam  Never done    Zoster Vaccines (1 of 2) Never done    Mammogram  08/31/2022    DEXA Scan  Never done    Pneumococcal Vaccine: 65+ Years (3 of  3 - PPSV23 or PCV20) 11/15/2022    COVID-19 Vaccine (3 - 2023-24 season) 09/01/2023    Diabetes Care Dilated Eye Exam  10/19/2023    Annual Physical  07/26/2024    Influenza Vaccine (1) 10/01/2024    Diabetes Care A1C  11/21/2024    Diabetes Care: GFR  05/21/2025    Diabetes Care: Microalb/Creat Ratio  05/21/2025    Colorectal Cancer Screening  07/19/2033    Annual Depression Screening  Completed    Fall Risk Screening (Annual)  Completed

## 2024-07-11 ENCOUNTER — TELEPHONE (OUTPATIENT)
Dept: FAMILY MEDICINE CLINIC | Facility: CLINIC | Age: 69
End: 2024-07-11

## 2024-07-11 NOTE — TELEPHONE ENCOUNTER
PATIENT CALLING- STATES WHEN PICKING UP ANTHONY, SHE WAS TOLD IT WOULD BE $500. PHARMACY INFORMED HER ABOUT A 'S COUPON. PATIENT CALLING TO INQUIRE ABOUT IT.

## 2024-07-29 DIAGNOSIS — I10 ESSENTIAL HYPERTENSION, BENIGN: ICD-10-CM

## 2024-07-30 RX ORDER — LISINOPRIL 40 MG/1
40 TABLET ORAL DAILY
Qty: 90 TABLET | Refills: 1 | Status: SHIPPED | OUTPATIENT
Start: 2024-07-30

## 2024-07-30 RX ORDER — MONTELUKAST SODIUM 10 MG/1
10 TABLET ORAL DAILY
Qty: 90 TABLET | Refills: 0 | Status: SHIPPED | OUTPATIENT
Start: 2024-07-30

## 2024-07-30 NOTE — TELEPHONE ENCOUNTER
LOV  07/08/2024    LAST LAB  05/21/2024    LAST RX  Lisinopril 05/02/24  Montelukast 05/03/2024    Next OV  No future appointments.      PROTOCOL  Name from pharmacy: LISINOPRIL 40MG TABLETS          Will file in chart as: LISINOPRIL 40 MG Oral Tab    Sig: TAKE 1 TABLET(40 MG) BY MOUTH DAILY    Disp: 90 tablet    Refills: 1 (Pharmacy requested: Not specified)    Start: 7/29/2024    Class: Normal    Non-formulary For: Essential hypertension, benign    Last ordered: 5 months ago (2/6/2024) by Rylee Infante MD    Last refill: 5/2/2024    Rx #: [id number]    Hypertension Medications Protocol Dverxs5007/29/2024 10:01 AM   Protocol Details CMP or BMP in past 12 months    Last BP reading less than 140/90    In person appointment or virtual visit in the past 12 mos or appointment in next 3 mos    EGFRCR or GFRNAA > 50       Name from pharmacy: MONTELUKAST 10MG TABLETS         Will file in chart as: MONTELUKAST 10 MG Oral Tab    Sig: TAKE 1 TABLET(10 MG) BY MOUTH DAILY    Disp: 90 tablet    Refills: 0 (Pharmacy requested: Not specified)    Start: 7/29/2024    Class: Normal    Non-formulary    Last ordered: 2 months ago (5/3/2024) by Rylee Infante MD    Last refill: 5/3/2024    Rx #: [id number]    Asthma & COPD Medication Protocol Ucgrqe0007/29/2024 10:01 AM   Protocol Details Asthma Action Score greater than or equal to 20    AAP/ACT given in last 12 months    Appointment in past 6 or next 3 months      To be filled at: [id number] [address] [facility] AT [facility] ([address]), [phone], [phone]

## 2024-08-05 ENCOUNTER — MED REC SCAN ONLY (OUTPATIENT)
Dept: FAMILY MEDICINE CLINIC | Facility: CLINIC | Age: 69
End: 2024-08-05

## 2024-09-13 DIAGNOSIS — I10 ESSENTIAL HYPERTENSION, BENIGN: ICD-10-CM

## 2024-09-13 RX ORDER — AMLODIPINE BESYLATE 10 MG/1
10 TABLET ORAL DAILY
Qty: 90 TABLET | Refills: 0 | Status: SHIPPED | OUTPATIENT
Start: 2024-09-13

## 2024-09-13 NOTE — TELEPHONE ENCOUNTER
Last office visit:7/8/24  Last filled:6/18/24 #90 with 0 RF   Last labs:5/21/24     No future appointments.    Protocol:    Hypertension Medications Protocol Wggeqj9009/13/2024 07:26 AM   Protocol Details CMP or BMP in past 12 months    Last BP reading less than 140/90    In person appointment or virtual visit in the past 12 mos or appointment in next 3 mos    EGFRCR or GFRNAA > 50

## 2024-09-19 ENCOUNTER — TELEPHONE (OUTPATIENT)
Dept: FAMILY MEDICINE CLINIC | Facility: CLINIC | Age: 69
End: 2024-09-19

## 2024-09-19 NOTE — TELEPHONE ENCOUNTER
Patient said she she has sinus pressure, drainage for 3 days and a bloody nose today.  Patient advised to treat symptomatically and make an appointment to be seen if symptoms don't resolve in 7-10 days or if they worsen. CY Infante/Vamshi ACOSTA

## 2024-10-07 DIAGNOSIS — F41.9 ANXIETY: ICD-10-CM

## 2024-10-07 RX ORDER — ALPRAZOLAM 0.25 MG
0.25 TABLET ORAL EVERY 4 HOURS PRN
Qty: 90 TABLET | Refills: 0 | Status: SHIPPED | OUTPATIENT
Start: 2024-10-07

## 2024-10-07 NOTE — TELEPHONE ENCOUNTER
Last refill: 07/08/24  Qty 90  W/ 0 refills  Last ov: 07/08/24    Requested Prescriptions     Pending Prescriptions Disp Refills    ALPRAZOLAM 0.25 MG Oral Tab [Pharmacy Med Name: ALPRAZOLAM 0.25MG TABLETS] 90 tablet 0     Sig: TAKE 1 TABLET(0.25 MG) BY MOUTH EVERY 4 HOURS AS NEEDED FOR ANXIETY     No future appointments.

## 2024-10-24 RX ORDER — MONTELUKAST SODIUM 10 MG/1
10 TABLET ORAL DAILY
Qty: 90 TABLET | Refills: 0 | Status: SHIPPED | OUTPATIENT
Start: 2024-10-24

## 2024-10-24 NOTE — TELEPHONE ENCOUNTER
LOV: 7-8-24  LAST LAB:5-21-24  LAST RX:    Medication Quantity Refills Start End   MONTELUKAST 10 MG Oral Tab 90 tablet 0 7/30/2024 --   Sig:   TAKE 1 TABLET(10 MG) BY MOUTH DAILY     Next OV: No future appointments.   PROTOCOL    Asthma & COPD Medication Protocol Qjtabn16/24/2024 01:38 PM   Protocol Details Asthma Action Score greater than or equal to 20    AAP/ACT given in last 12 months    Appointment in past 6 or next 3 months

## 2024-11-12 DIAGNOSIS — I10 ESSENTIAL HYPERTENSION, BENIGN: ICD-10-CM

## 2024-11-12 RX ORDER — METOPROLOL SUCCINATE 50 MG/1
50 TABLET, EXTENDED RELEASE ORAL DAILY
Qty: 30 TABLET | Refills: 1 | Status: SHIPPED | OUTPATIENT
Start: 2024-11-12

## 2024-11-12 NOTE — TELEPHONE ENCOUNTER
metoprolol succinate ER 50 MG Oral Tablet 24 Hr     Hypertension Medications Protocol Avjvbu3211/12/2024 11:44 AM   Protocol Details CMP or BMP in past 12 months    Last BP reading less than 140/90    In person appointment or virtual visit in the past 12 mos or appointment in next 3 mos    EGFRCR or GFRNAA > 50      Last office visit:  7/8/24  No future appointments.  Last filled: 5/8/24  #90 with 1 refills    Last labs: 5/21/24  eGFR-CR:  77  Last BP:  128/76

## 2024-12-17 DIAGNOSIS — I10 ESSENTIAL HYPERTENSION, BENIGN: ICD-10-CM

## 2024-12-18 ENCOUNTER — LABORATORY ENCOUNTER (OUTPATIENT)
Dept: LAB | Age: 69
End: 2024-12-18
Attending: INTERNAL MEDICINE
Payer: MEDICARE

## 2024-12-18 DIAGNOSIS — I10 ESSENTIAL HYPERTENSION, BENIGN: ICD-10-CM

## 2024-12-18 DIAGNOSIS — E11.9 TYPE 2 DIABETES MELLITUS WITHOUT COMPLICATION, WITHOUT LONG-TERM CURRENT USE OF INSULIN (HCC): ICD-10-CM

## 2024-12-18 DIAGNOSIS — E03.9 ACQUIRED HYPOTHYROIDISM: ICD-10-CM

## 2024-12-18 DIAGNOSIS — E55.9 VITAMIN D DEFICIENCY: ICD-10-CM

## 2024-12-18 LAB
ALBUMIN SERPL-MCNC: 4.2 G/DL (ref 3.2–4.8)
ALBUMIN/GLOB SERPL: 1.4 {RATIO} (ref 1–2)
ALP LIVER SERPL-CCNC: 69 U/L
ALT SERPL-CCNC: 16 U/L
ANION GAP SERPL CALC-SCNC: 6 MMOL/L (ref 0–18)
AST SERPL-CCNC: 17 U/L (ref ?–34)
BASOPHILS # BLD AUTO: 0.08 X10(3) UL (ref 0–0.2)
BASOPHILS NFR BLD AUTO: 1.3 %
BILIRUB SERPL-MCNC: 0.7 MG/DL (ref 0.2–1.1)
BUN BLD-MCNC: 12 MG/DL (ref 9–23)
CALCIUM BLD-MCNC: 9.9 MG/DL (ref 8.7–10.4)
CHLORIDE SERPL-SCNC: 106 MMOL/L (ref 98–112)
CHOLEST SERPL-MCNC: 173 MG/DL (ref ?–200)
CO2 SERPL-SCNC: 29 MMOL/L (ref 21–32)
CREAT BLD-MCNC: 1.01 MG/DL
EGFRCR SERPLBLD CKD-EPI 2021: 61 ML/MIN/1.73M2 (ref 60–?)
EOSINOPHIL # BLD AUTO: 0.14 X10(3) UL (ref 0–0.7)
EOSINOPHIL NFR BLD AUTO: 2.2 %
ERYTHROCYTE [DISTWIDTH] IN BLOOD BY AUTOMATED COUNT: 13.2 %
EST. AVERAGE GLUCOSE BLD GHB EST-MCNC: 137 MG/DL (ref 68–126)
FASTING PATIENT LIPID ANSWER: YES
FASTING STATUS PATIENT QL REPORTED: YES
GLOBULIN PLAS-MCNC: 2.9 G/DL (ref 2–3.5)
GLUCOSE BLD-MCNC: 138 MG/DL (ref 70–99)
HBA1C MFR BLD: 6.4 % (ref ?–5.7)
HCT VFR BLD AUTO: 38.6 %
HDLC SERPL-MCNC: 42 MG/DL (ref 40–59)
HGB BLD-MCNC: 12.4 G/DL
IMM GRANULOCYTES # BLD AUTO: 0.03 X10(3) UL (ref 0–1)
IMM GRANULOCYTES NFR BLD: 0.5 %
LDLC SERPL CALC-MCNC: 95 MG/DL (ref ?–100)
LYMPHOCYTES # BLD AUTO: 1.22 X10(3) UL (ref 1–4)
LYMPHOCYTES NFR BLD AUTO: 19.2 %
MCH RBC QN AUTO: 30 PG (ref 26–34)
MCHC RBC AUTO-ENTMCNC: 32.1 G/DL (ref 31–37)
MCV RBC AUTO: 93.5 FL
MONOCYTES # BLD AUTO: 0.36 X10(3) UL (ref 0.1–1)
MONOCYTES NFR BLD AUTO: 5.7 %
NEUTROPHILS # BLD AUTO: 4.53 X10 (3) UL (ref 1.5–7.7)
NEUTROPHILS # BLD AUTO: 4.53 X10(3) UL (ref 1.5–7.7)
NEUTROPHILS NFR BLD AUTO: 71.1 %
NONHDLC SERPL-MCNC: 131 MG/DL (ref ?–130)
OSMOLALITY SERPL CALC.SUM OF ELEC: 294 MOSM/KG (ref 275–295)
PLATELET # BLD AUTO: 126 10(3)UL (ref 150–450)
PLATELETS.RETICULATED NFR BLD AUTO: 9.3 % (ref 0–7)
POTASSIUM SERPL-SCNC: 4.3 MMOL/L (ref 3.5–5.1)
PROT SERPL-MCNC: 7.1 G/DL (ref 5.7–8.2)
RBC # BLD AUTO: 4.13 X10(6)UL
SODIUM SERPL-SCNC: 141 MMOL/L (ref 136–145)
T4 FREE SERPL-MCNC: 1.1 NG/DL (ref 0.8–1.7)
TRIGL SERPL-MCNC: 209 MG/DL (ref 30–149)
TSI SER-ACNC: 5.36 UIU/ML (ref 0.55–4.78)
VIT D+METAB SERPL-MCNC: 39.2 NG/ML (ref 30–100)
VLDLC SERPL CALC-MCNC: 34 MG/DL (ref 0–30)
WBC # BLD AUTO: 6.4 X10(3) UL (ref 4–11)

## 2024-12-18 PROCEDURE — 80061 LIPID PANEL: CPT

## 2024-12-18 PROCEDURE — 84439 ASSAY OF FREE THYROXINE: CPT

## 2024-12-18 PROCEDURE — 80053 COMPREHEN METABOLIC PANEL: CPT

## 2024-12-18 PROCEDURE — 83036 HEMOGLOBIN GLYCOSYLATED A1C: CPT

## 2024-12-18 PROCEDURE — 85025 COMPLETE CBC W/AUTO DIFF WBC: CPT

## 2024-12-18 PROCEDURE — 36415 COLL VENOUS BLD VENIPUNCTURE: CPT

## 2024-12-18 PROCEDURE — 82306 VITAMIN D 25 HYDROXY: CPT

## 2024-12-18 PROCEDURE — 84443 ASSAY THYROID STIM HORMONE: CPT

## 2024-12-18 RX ORDER — AMLODIPINE BESYLATE 10 MG/1
10 TABLET ORAL DAILY
Qty: 90 TABLET | Refills: 0 | OUTPATIENT
Start: 2024-12-18

## 2024-12-18 RX ORDER — AMLODIPINE BESYLATE 10 MG/1
10 TABLET ORAL DAILY
Qty: 90 TABLET | Refills: 0 | Status: SHIPPED | OUTPATIENT
Start: 2024-12-18

## 2024-12-18 RX ORDER — LEVETIRACETAM 750 MG/1
750 TABLET ORAL 2 TIMES DAILY
Qty: 180 TABLET | Refills: 3 | Status: SHIPPED | OUTPATIENT
Start: 2024-12-18

## 2024-12-18 RX ORDER — LEVETIRACETAM 750 MG/1
750 TABLET ORAL 2 TIMES DAILY
Qty: 180 TABLET | Refills: 3 | OUTPATIENT
Start: 2024-12-18

## 2024-12-18 NOTE — TELEPHONE ENCOUNTER
LOV: 7/8/24    LAST LAB:7-26-24    LAST RX    Medication Quantity Refills Start End   AMLODIPINE 10 MG Oral Tab 90 tablet 0 9/13/2024 --   Sig:   TAKE 1 TABLET(10 MG) BY MOUTH DAILY       Medication Quantity Refills Start End   levetiracetam 750 MG Oral Tab 180 tablet 3 12/28/2023 --   Sig:   Take 1 tablet (750 mg total) by mouth 2 (two) times daily.     Route:   Oral             Next OV:   Future Appointments   Date Time Provider Department Center   3/27/2025 10:15 AM Rylee Infante MD [facility] [place]   3/27/2025 10:45 AM REF [facility]      PROTOCOL    Neurology Medications Eplfsc2612/17/2024 06:40 PM   Protocol Details In person appointment or virtual visit in the past 6 mos or appointment in next 3 mos             Hypertension Medications Protocol Qmykjw4112/17/2024 06:40 PM   Protocol Details CMP or BMP in past 12 months    Last BP reading less than 140/90    In person appointment or virtual visit in the past 12 mos or appointment in next 3 mos    EGFRCR or GFRNAA > 50

## 2024-12-31 DIAGNOSIS — F41.9 ANXIETY: ICD-10-CM

## 2025-01-02 RX ORDER — ALPRAZOLAM 0.25 MG/1
0.25 TABLET ORAL EVERY 4 HOURS PRN
Qty: 30 TABLET | Refills: 0 | Status: SHIPPED | OUTPATIENT
Start: 2025-01-02 | End: 2025-02-01

## 2025-01-02 NOTE — TELEPHONE ENCOUNTER
Last refill: 10/07/24  Qty: 90  W/ 0 refills  Last ov: 07/08/24    Requested Prescriptions     Pending Prescriptions Disp Refills    ALPRAZOLAM 0.25 MG Oral Tab [Pharmacy Med Name: ALPRAZOLAM 0.25MG TABLETS] 90 tablet 0     Sig: TAKE 1 TABLET(0.25 MG) BY MOUTH EVERY 4 HOURS AS NEEDED FOR ANXIETY     Future Appointments   Date Time Provider Department Center   3/27/2025 10:15 AM Rylee Infante MD [facility] [place]   3/27/2025 10:45 AM REF [facility]

## 2025-01-21 ENCOUNTER — TELEPHONE (OUTPATIENT)
Dept: FAMILY MEDICINE CLINIC | Facility: CLINIC | Age: 70
End: 2025-01-21

## 2025-01-21 NOTE — TELEPHONE ENCOUNTER
I offered appt. For today but to cold to come out today so she did take appt. Tomorrow. She wants to speak with nurse today about sinus issues. She is out of symbicort inhaler and wanting a refill.

## 2025-01-21 NOTE — TELEPHONE ENCOUNTER
Patient said that for the last few weeks she has had green drainage coming out of her nose and has had a slight cough. She said she had been feeling fine and then when she woke up Sunday morning she had some shortness of breath. She has been using Xclear. She has also been using her rescue inhaler every 4 hours but does not have a steroid inhaler.   She asked if Dr Infante would prescribe an antibiotic without having to come in.

## 2025-01-22 ENCOUNTER — OFFICE VISIT (OUTPATIENT)
Dept: FAMILY MEDICINE CLINIC | Facility: CLINIC | Age: 70
End: 2025-01-22
Payer: MEDICARE

## 2025-01-22 VITALS
TEMPERATURE: 99 F | RESPIRATION RATE: 18 BRPM | OXYGEN SATURATION: 99 % | HEART RATE: 69 BPM | SYSTOLIC BLOOD PRESSURE: 134 MMHG | DIASTOLIC BLOOD PRESSURE: 78 MMHG

## 2025-01-22 DIAGNOSIS — J01.00 SUBACUTE MAXILLARY SINUSITIS: Primary | ICD-10-CM

## 2025-01-22 PROCEDURE — 87637 SARSCOV2&INF A&B&RSV AMP PRB: CPT | Performed by: INTERNAL MEDICINE

## 2025-01-22 PROCEDURE — 99214 OFFICE O/P EST MOD 30 MIN: CPT | Performed by: INTERNAL MEDICINE

## 2025-01-22 PROCEDURE — G2211 COMPLEX E/M VISIT ADD ON: HCPCS | Performed by: INTERNAL MEDICINE

## 2025-01-22 RX ORDER — BUDESONIDE AND FORMOTEROL FUMARATE DIHYDRATE 80; 4.5 UG/1; UG/1
2 AEROSOL RESPIRATORY (INHALATION) 2 TIMES DAILY
Qty: 1 EACH | Refills: 1 | Status: SHIPPED | OUTPATIENT
Start: 2025-01-22 | End: 2025-02-21

## 2025-01-22 RX ORDER — CLONAZEPAM 0.5 MG/1
0.5 TABLET ORAL 2 TIMES DAILY PRN
Qty: 10 TABLET | Refills: 0 | Status: SHIPPED | OUTPATIENT
Start: 2025-01-22

## 2025-01-22 RX ORDER — ALBUTEROL SULFATE 90 UG/1
2 INHALANT RESPIRATORY (INHALATION) EVERY 4 HOURS PRN
Qty: 1 EACH | Refills: 1 | Status: SHIPPED | OUTPATIENT
Start: 2025-01-22 | End: 2025-02-21

## 2025-01-22 RX ORDER — CIPROFLOXACIN 500 MG/1
500 TABLET, FILM COATED ORAL 2 TIMES DAILY
Qty: 20 TABLET | Refills: 0 | Status: SHIPPED | OUTPATIENT
Start: 2025-01-22 | End: 2025-02-01

## 2025-01-22 NOTE — PROGRESS NOTES
Sarita Robertson is a 69 year old female.  HPI:   Pt has had nasal congestion, fatigue and difficult time breathing--she started ventolin, but had no steroid inhaler.   Current Outpatient Medications   Medication Sig Dispense Refill    ciprofloxacin (CIPRO) 500 MG Oral Tab Take 1 tablet (500 mg total) by mouth 2 (two) times daily for 10 days. 20 tablet 0    Budesonide-Formoterol Fumarate (SYMBICORT) 80-4.5 MCG/ACT Inhalation Aerosol Inhale 2 puffs into the lungs 2 (two) times daily. 1 each 1    albuterol (VENTOLIN HFA) 108 (90 Base) MCG/ACT Inhalation Aero Soln Inhale 2 puffs into the lungs every 4 (four) hours as needed for Wheezing. 1 each 1    clonazePAM 0.5 MG Oral Tab Take 1 tablet (0.5 mg total) by mouth 2 (two) times daily as needed (for seizure, then call [phone]). 10 tablet 0    ALPRAZolam 0.25 MG Oral Tab Take 1 tablet (0.25 mg total) by mouth every 4 (four) hours as needed for Anxiety. 30 tablet 0    AMLODIPINE 10 MG Oral Tab TAKE 1 TABLET(10 MG) BY MOUTH DAILY 90 tablet 0    LEVETIRACETAM 750 MG Oral Tab TAKE 1 TABLET(750 MG) BY MOUTH TWICE DAILY 180 tablet 3    metoprolol succinate ER 50 MG Oral Tablet 24 Hr Take 1 tablet (50 mg total) by mouth daily. **due for follow up visit prior to next refill** 30 tablet 1    MONTELUKAST 10 MG Oral Tab TAKE 1 TABLET(10 MG) BY MOUTH DAILY 90 tablet 0    LISINOPRIL 40 MG Oral Tab TAKE 1 TABLET(40 MG) BY MOUTH DAILY 90 tablet 1    SITagliptin Phosphate (JANUVIA) 50 MG Oral Tab Take 1 tablet (50 mg total) by mouth daily. 90 tablet 3    Meloxicam 15 MG Oral Tab Take 1 tablet (15 mg total) by mouth daily. 90 tablet 3    Fluticasone Propionate  MCG/ACT Inhalation Aerosol Inhale 2 puffs into the lungs Q12H.      oseltamivir 75 MG Oral Cap Take 1 capsule (75 mg total) by mouth 2 (two) times daily.      predniSONE 20 MG Oral Tab Take 2 tablets (40 mg total) by mouth daily.      Budesonide-Formoterol Fumarate 80-4.5 MCG/ACT Inhalation Aerosol Inhale 2 puffs into the lungs  2 (two) times daily. 10.2 g 0    MAGNESIUM OR Take by mouth.      acetaminophen 325 MG Oral Tab Take 2 tablets (650 mg total) by mouth every 6 (six) hours as needed.      saccharomyces boulardii (FLORASTOR) 250 MG Oral Cap Take 1 capsule (250 mg total) by mouth 2 (two) times daily. 60 capsule 11      Past Medical History:    Allergic rhinitis, cause unspecified    Anxiety    Back pain    Belching    Bleeding nose    Blood disorder    ITP    Blood in urine    Constipation    Decorative tattoo    Diarrhea, unspecified    Dysphagia    Easy bruising    Esophageal reflux    Essential hypertension, benign    Flatulence/gas pain/belching    High blood pressure    High cholesterol    History of blood transfusion    2017 last tranfusion    History of depression    Irregular bowel habits    MS (multiple sclerosis) ([facility])    diagnosed 30 years ago    Obesity, unspecified    Osteoarthrosis, unspecified whether generalized or localized, unspecified site    Other and unspecified hyperlipidemia    Pain in joint, lower leg    Pain in joints    Postmenopausal atrophic vaginitis    Pure hypercholesterolemia    Seizure disorder ([facility])    last episode Sept 14, 2015    Stress    Unspecified asthma(493.90)    Unspecified essential hypertension    Unspecified hypothyroidism    Urge incontinence    Uterovaginal prolapse, incomplete    Visual impairment    glasses      Social History:  Social History     Socioeconomic History    Marital status:     Number of children: 2   Tobacco Use    Smoking status: Never    Smokeless tobacco: Never    Tobacco comments:     Non-smoker   Vaping Use    Vaping status: Never Used   Substance and Sexual Activity    Alcohol use: Not Currently     Comment: Maybe a couple times a year    Drug use: No   Other Topics Concern    Caffeine Concern No    Exercise No    Seat Belt Yes     Comment: 100%     Social Drivers of Health     Food Insecurity: Low Risk  (12/20/2021)    Received from [facility]    Food Insecurity     Have there been times that your food ran out, and you didn't have money to get more?: No     Are there times that you worry that this might happen?: No   Transportation Needs: Low Risk  (12/20/2021)    Received from [facility]    Transportation Needs     Do you have trouble getting transportation to medical appointments?: No   Housing Stability: Low Risk  (12/20/2021)    Received from [facility]    Housing Stability     Are you concerned about having a safe and reliable place to live?: No        REVIEW OF SYSTEMS:   GENERAL HEALTH: feels well otherwise  SKIN: denies any unusual skin lesions or rashes  RESPIRATORY: denies shortness of breath with exertion  CARDIOVASCULAR: denies chest pain on exertion  GI: denies abdominal pain and denies heartburn  NEURO: denies headaches    EXAM:   /78   Pulse 69   Temp 98.8 °F (37.1 °C) (Temporal)   Resp 18   LMP  (LMP Unknown)   SpO2 99%   GENERAL: well developed, well nourished,in no apparent distress  SKIN: no rashes,no suspicious lesions  HEENT: atraumatic, normocephalic,ears and throat are clear  NECK: supple,no adenopathy,no bruits  LUNGS: clear to auscultation  CARDIO: RRR without murmur  GI: good BS's,no masses, HSM or tenderness  EXTREMITIES: no cyanosis, clubbing or edema    ASSESSMENT AND PLAN:     Encounter Diagnosis   Name Primary?    Subacute maxillary sinusitis Yes     Sinus treatment.  May use OTC medications for cough, low grade fever, or congestion.  Return if symptoms do not resolve or improve in the next 5-7 days. Sars sent      No orders of the defined types were placed in this encounter.      Meds & Refills for this Visit:  Requested Prescriptions     Signed Prescriptions Disp Refills    ciprofloxacin (CIPRO) 500 MG Oral Tab 20 tablet 0     Sig: Take 1 tablet (500 mg  total) by mouth 2 (two) times daily for 10 days.    Budesonide-Formoterol Fumarate (SYMBICORT) 80-4.5 MCG/ACT Inhalation Aerosol 1 each 1     Sig: Inhale 2 puffs into the lungs 2 (two) times daily.    albuterol (VENTOLIN HFA) 108 (90 Base) MCG/ACT Inhalation Aero Soln 1 each 1     Sig: Inhale 2 puffs into the lungs every 4 (four) hours as needed for Wheezing.    clonazePAM 0.5 MG Oral Tab 10 tablet 0     Sig: Take 1 tablet (0.5 mg total) by mouth 2 (two) times daily as needed (for seizure, then call [phone]).       Imaging & Consults:  None    Follow up as needed.     The patient indicates understanding of these issues and agrees to the plan.

## 2025-01-23 DIAGNOSIS — I10 ESSENTIAL HYPERTENSION, BENIGN: ICD-10-CM

## 2025-01-23 RX ORDER — METOPROLOL SUCCINATE 50 MG/1
TABLET, EXTENDED RELEASE ORAL
Qty: 30 TABLET | Refills: 1 | Status: SHIPPED | OUTPATIENT
Start: 2025-01-23

## 2025-01-23 RX ORDER — LISINOPRIL 40 MG/1
40 TABLET ORAL DAILY
Qty: 90 TABLET | Refills: 1 | Status: SHIPPED | OUTPATIENT
Start: 2025-01-23

## 2025-01-23 RX ORDER — MONTELUKAST SODIUM 10 MG/1
10 TABLET ORAL DAILY
Qty: 90 TABLET | Refills: 0 | Status: SHIPPED | OUTPATIENT
Start: 2025-01-23

## 2025-01-23 NOTE — TELEPHONE ENCOUNTER
LOV: 1/22/25    LAST LAB 12/18/2024 cbc,cmp, lipids,tsh     LAST RX:  Medication Quantity Refills Start End   MONTELUKAST 10 MG Oral Tab 90 tablet 0 10/24/2024 --   Sig:   TAKE 1 TABLET(10 MG) BY MOUTH       Medication Quantity Refills Start End   metoprolol succinate ER 50 MG Oral Tablet 24 Hr 30 tablet 1 11/12/2024 --   Sig:   Take 1 tablet (50 mg total) by mouth daily. **due for follow up visit prior to next refill**       Next OV:  LISINOPRIL 40 MG Oral Tab 90 tablet 1 7/30/2024 --   Sig:   TAKE 1 TABLET(40 MG) BY MOUTH DAILY       Future Appointments   Date Time Provider Department Center   3/27/2025 10:15 AM Rylee Infante MD [facility]   3/27/2025 10:45 AM REF [facility] REF [facility] Ref [facility]         PROTOCOL:    Hypertension Medications Protocol Paulzm1901/23/2025 01:08 PM   Protocol Details CMP or BMP in past 12 months    Last BP reading less than 140/90    In person appointment or virtual visit in the past 12 mos or appointment in next 3 mos    EGFRCR or GFRNAA > 50    Medication is active on med list

## 2025-01-24 LAB
FLUAV + FLUBV RNA SPEC NAA+PROBE: DETECTED
FLUAV + FLUBV RNA SPEC NAA+PROBE: NOT DETECTED
RSV RNA SPEC NAA+PROBE: NOT DETECTED
SARS-COV-2 RNA RESP QL NAA+PROBE: NOT DETECTED

## 2025-02-17 RX ORDER — ALPRAZOLAM 0.25 MG
TABLET ORAL
COMMUNITY
Start: 2025-02-16 | End: 2025-02-17
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
## 2025-03-09 DIAGNOSIS — I10 ESSENTIAL HYPERTENSION, BENIGN: ICD-10-CM

## 2025-03-10 RX ORDER — AMLODIPINE BESYLATE 10 MG/1
10 TABLET ORAL DAILY
Qty: 90 TABLET | Refills: 0 | Status: SHIPPED | OUTPATIENT
Start: 2025-03-10

## 2025-03-10 NOTE — TELEPHONE ENCOUNTER
Amlodipine 10 mg Oral Tablets    Last Rf: 12/18/24 by Rylee Infante MD  Qt: 90 tablets w/ 0 Rf    Last ov: 1/22/25 w/ Rylee Infante MD    CMP on 12/18/24    BP Readings from Last 3 Encounters:   01/22/25 134/78   07/08/24 128/76   05/21/24 132/80     eGFR-Cr  >=60 mL/min/1.73m2 61       Hypertension Medications Protocol [id number]/09/2025 03:27 PM   Protocol Details CMP or BMP in past 12 months    Last BP reading less than 140/90    In person appointment or virtual visit in the past 12 mos or appointment in next 3 mos    EGFRCR or GFRNAA > 50    Medication is active on med list        No future medication.

## 2025-03-18 DIAGNOSIS — F41.9 ANXIETY: ICD-10-CM

## 2025-03-18 NOTE — TELEPHONE ENCOUNTER
Can you put in her order.  She had a patient email wanting some other type of labs.   Last refill: 02/17/25  Qty: 30  W 0 refills  Last ov: 01/22/25    Requested Prescriptions     Pending Prescriptions Disp Refills    ALPRAZOLAM 0.25 MG Oral Tab [Pharmacy Med Name: ALPRAZOLAM 0.25MG TABLETS] 30 tablet 0     Sig: TAKE 1 TABLET(0.25 MG) BY MOUTH EVERY NIGHT AS NEEDED     Future Appointments   Date Time Provider Department Center   3/27/2025 10:15 AM Rylee Infante MD [facility] [place]   3/27/2025 10:45 AM REF [facility] REF [facility] Ref [facility]

## 2025-03-19 RX ORDER — ALPRAZOLAM 0.25 MG
0.25 TABLET ORAL
Qty: 30 TABLET | Refills: 0 | Status: SHIPPED | OUTPATIENT
Start: 2025-03-19

## 2025-03-27 ENCOUNTER — LABORATORY ENCOUNTER (OUTPATIENT)
Dept: LAB | Age: 70
End: 2025-03-27
Attending: INTERNAL MEDICINE
Payer: MEDICARE

## 2025-03-27 ENCOUNTER — OFFICE VISIT (OUTPATIENT)
Dept: FAMILY MEDICINE CLINIC | Facility: CLINIC | Age: 70
End: 2025-03-27
Payer: MEDICARE

## 2025-03-27 VITALS
RESPIRATION RATE: 18 BRPM | HEART RATE: 74 BPM | DIASTOLIC BLOOD PRESSURE: 88 MMHG | OXYGEN SATURATION: 97 % | TEMPERATURE: 98 F | SYSTOLIC BLOOD PRESSURE: 138 MMHG

## 2025-03-27 DIAGNOSIS — F41.9 ANXIETY: ICD-10-CM

## 2025-03-27 DIAGNOSIS — I10 ESSENTIAL HYPERTENSION, BENIGN: ICD-10-CM

## 2025-03-27 DIAGNOSIS — R63.5 WEIGHT GAIN: ICD-10-CM

## 2025-03-27 DIAGNOSIS — E11.9 TYPE 2 DIABETES MELLITUS WITHOUT COMPLICATION, WITHOUT LONG-TERM CURRENT USE OF INSULIN (HCC): ICD-10-CM

## 2025-03-27 DIAGNOSIS — E78.00 PURE HYPERCHOLESTEROLEMIA: ICD-10-CM

## 2025-03-27 DIAGNOSIS — E11.9 TYPE 2 DIABETES MELLITUS WITHOUT COMPLICATION, WITHOUT LONG-TERM CURRENT USE OF INSULIN (HCC): Primary | ICD-10-CM

## 2025-03-27 LAB
ALBUMIN SERPL-MCNC: 4.6 G/DL (ref 3.2–4.8)
ALBUMIN/GLOB SERPL: 1.6 {RATIO} (ref 1–2)
ALP LIVER SERPL-CCNC: 83 U/L
ALT SERPL-CCNC: 23 U/L
ANION GAP SERPL CALC-SCNC: 8 MMOL/L (ref 0–18)
AST SERPL-CCNC: 19 U/L (ref ?–34)
BASOPHILS # BLD AUTO: 0.09 X10(3) UL (ref 0–0.2)
BASOPHILS NFR BLD AUTO: 1.2 %
BILIRUB SERPL-MCNC: 0.6 MG/DL (ref 0.2–1.1)
BUN BLD-MCNC: 12 MG/DL (ref 9–23)
CALCIUM BLD-MCNC: 10 MG/DL (ref 8.7–10.6)
CHLORIDE SERPL-SCNC: 103 MMOL/L (ref 98–112)
CHOLEST SERPL-MCNC: 204 MG/DL (ref ?–200)
CO2 SERPL-SCNC: 29 MMOL/L (ref 21–32)
CREAT BLD-MCNC: 1.03 MG/DL
EGFRCR SERPLBLD CKD-EPI 2021: 60 ML/MIN/1.73M2 (ref 60–?)
EOSINOPHIL # BLD AUTO: 0.14 X10(3) UL (ref 0–0.7)
EOSINOPHIL NFR BLD AUTO: 1.9 %
ERYTHROCYTE [DISTWIDTH] IN BLOOD BY AUTOMATED COUNT: 13.3 %
EST. AVERAGE GLUCOSE BLD GHB EST-MCNC: 174 MG/DL (ref 68–126)
FASTING PATIENT LIPID ANSWER: YES
FASTING STATUS PATIENT QL REPORTED: YES
GLOBULIN PLAS-MCNC: 2.8 G/DL (ref 2–3.5)
GLUCOSE BLD-MCNC: 183 MG/DL (ref 70–99)
HBA1C MFR BLD: 7.7 % (ref ?–5.7)
HCT VFR BLD AUTO: 40.4 %
HDLC SERPL-MCNC: 44 MG/DL (ref 40–59)
HGB BLD-MCNC: 13.6 G/DL
IMM GRANULOCYTES # BLD AUTO: 0.04 X10(3) UL (ref 0–1)
IMM GRANULOCYTES NFR BLD: 0.6 %
LDLC SERPL CALC-MCNC: 117 MG/DL (ref ?–100)
LYMPHOCYTES # BLD AUTO: 1.39 X10(3) UL (ref 1–4)
LYMPHOCYTES NFR BLD AUTO: 19.1 %
MCH RBC QN AUTO: 30.3 PG (ref 26–34)
MCHC RBC AUTO-ENTMCNC: 33.7 G/DL (ref 31–37)
MCV RBC AUTO: 90 FL
MONOCYTES # BLD AUTO: 0.37 X10(3) UL (ref 0.1–1)
MONOCYTES NFR BLD AUTO: 5.1 %
NEUTROPHILS # BLD AUTO: 5.24 X10 (3) UL (ref 1.5–7.7)
NEUTROPHILS # BLD AUTO: 5.24 X10(3) UL (ref 1.5–7.7)
NEUTROPHILS NFR BLD AUTO: 72.1 %
NONHDLC SERPL-MCNC: 160 MG/DL (ref ?–130)
OSMOLALITY SERPL CALC.SUM OF ELEC: 294 MOSM/KG (ref 275–295)
PLATELET # BLD AUTO: 150 10(3)UL (ref 150–450)
POTASSIUM SERPL-SCNC: 4.2 MMOL/L (ref 3.5–5.1)
PROT SERPL-MCNC: 7.4 G/DL (ref 5.7–8.2)
RBC # BLD AUTO: 4.49 X10(6)UL
SODIUM SERPL-SCNC: 140 MMOL/L (ref 136–145)
T4 FREE SERPL-MCNC: 0.9 NG/DL (ref 0.8–1.7)
TRIGL SERPL-MCNC: 248 MG/DL (ref 30–149)
TSI SER-ACNC: 5.44 UIU/ML (ref 0.55–4.78)
VLDLC SERPL CALC-MCNC: 44 MG/DL (ref 0–30)
WBC # BLD AUTO: 7.3 X10(3) UL (ref 4–11)

## 2025-03-27 PROCEDURE — 85025 COMPLETE CBC W/AUTO DIFF WBC: CPT

## 2025-03-27 PROCEDURE — 83036 HEMOGLOBIN GLYCOSYLATED A1C: CPT

## 2025-03-27 PROCEDURE — 99214 OFFICE O/P EST MOD 30 MIN: CPT | Performed by: INTERNAL MEDICINE

## 2025-03-27 PROCEDURE — 36415 COLL VENOUS BLD VENIPUNCTURE: CPT

## 2025-03-27 PROCEDURE — 80061 LIPID PANEL: CPT

## 2025-03-27 PROCEDURE — 84439 ASSAY OF FREE THYROXINE: CPT

## 2025-03-27 PROCEDURE — G2211 COMPLEX E/M VISIT ADD ON: HCPCS | Performed by: INTERNAL MEDICINE

## 2025-03-27 PROCEDURE — 84443 ASSAY THYROID STIM HORMONE: CPT

## 2025-03-27 PROCEDURE — 80053 COMPREHEN METABOLIC PANEL: CPT

## 2025-03-27 RX ORDER — TRANEXAMIC ACID 650 MG/1
TABLET ORAL
COMMUNITY
Start: 2024-08-02

## 2025-03-27 RX ORDER — ALBUTEROL SULFATE 90 UG/1
2 AEROSOL, METERED RESPIRATORY (INHALATION) EVERY 4 HOURS PRN
COMMUNITY
Start: 2025-02-23

## 2025-03-27 NOTE — PROGRESS NOTES
HPI:   Sarita Robertson is a 69 year old female who presents for recheck of her diabetes. Last visit with ophthalmologist was last week.  Pt has been checking her feet on a regular basis. Pt denies any tingling of the feet. Pt denies any issues with depression. Pt complains of feels like she does not like her like her life to be too busy.    Wt Readings from Last 6 Encounters:   07/26/23 230 lb 2 oz (104.4 kg)   07/19/23 231 lb (104.8 kg)   03/20/23 224 lb 8 oz (101.8 kg)   03/01/23 222 lb (100.7 kg)   05/24/22 232 lb 2 oz (105.3 kg)   12/27/21 213 lb 4 oz (96.7 kg)     There is no height or weight on file to calculate BMI.     Lab Results   Component Value Date    A1C 6.4 (H) 12/18/2024    A1C 6.5 (H) 05/21/2024    A1C 6.8 (H) 07/26/2023     Lab Results   Component Value Date    CHOLEST 173 12/18/2024    CHOLEST 207 (H) 05/21/2024    CHOLEST 157 07/26/2023     Lab Results   Component Value Date    HDL 42 12/18/2024    HDL 36 (L) 05/21/2024    HDL 42 07/26/2023     Lab Results   Component Value Date    LDL 95 12/18/2024     (H) 05/21/2024    LDL 84 07/26/2023     Lab Results   Component Value Date    TRIG 209 (H) 12/18/2024    TRIG 387 (H) 05/21/2024    TRIG 180 (H) 07/26/2023     Lab Results   Component Value Date    AST 17 12/18/2024    AST 17 05/21/2024    AST 10 (L) 07/26/2023     Lab Results   Component Value Date    ALT 16 12/18/2024    ALT 22 05/21/2024    ALT 24 07/26/2023     Malb/Cre Calc   Date Value Ref Range Status   05/21/2024 7.0 <=30.0 ug/mg Final     Comment:     <30 ug/mg creatinine       Normal     ug/mg creatinine   Microalbuminuria   >300 ug/mg creatinine      Albuminuria       08/04/2020 9.3 <=30.0 ug/mg Final     Comment:     <30 ug/mg creatinine       Normal     ug/mg creatinine   Microalbuminuria   >300 ug/mg creatinine      Albuminuria           Current Outpatient Medications   Medication Sig Dispense Refill    tranexamic acid 650 MG Oral Tab Two tab prior to procedures  then 1 to 2 tab TID prn bleeding  Indications: Platelet storage pool disorder      ALPRAZOLAM 0.25 MG Oral Tab TAKE 1 TABLET(0.25 MG) BY MOUTH EVERY NIGHT AS NEEDED 30 tablet 0    AMLODIPINE 10 MG Oral Tab TAKE 1 TABLET(10 MG) BY MOUTH DAILY 90 tablet 0    METOPROLOL SUCCINATE ER 50 MG Oral Tablet 24 Hr TAKE 1 TABLET(50 MG) BY MOUTH DAILY. FOLLOW UP VISIT BEFORE NEXT REFILL** 30 tablet 1    MONTELUKAST 10 MG Oral Tab TAKE 1 TABLET(10 MG) BY MOUTH DAILY 90 tablet 0    LISINOPRIL 40 MG Oral Tab TAKE 1 TABLET(40 MG) BY MOUTH DAILY 90 tablet 1    clonazePAM 0.5 MG Oral Tab Take 1 tablet (0.5 mg total) by mouth 2 (two) times daily as needed (for seizure, then call 991). 10 tablet 0    LEVETIRACETAM 750 MG Oral Tab TAKE 1 TABLET(750 MG) BY MOUTH TWICE DAILY 180 tablet 3    Meloxicam 15 MG Oral Tab Take 1 tablet (15 mg total) by mouth daily. 90 tablet 3    Fluticasone Propionate  MCG/ACT Inhalation Aerosol Inhale 2 puffs into the lungs Q12H.      MAGNESIUM OR Take by mouth.      acetaminophen 325 MG Oral Tab Take 2 tablets (650 mg total) by mouth every 6 (six) hours as needed.      saccharomyces boulardii (FLORASTOR) 250 MG Oral Cap Take 1 capsule (250 mg total) by mouth 2 (two) times daily. 60 capsule 11    VENTOLIN  (90 Base) MCG/ACT Inhalation Aero Soln Inhale 2 puffs into the lungs every 4 (four) hours as needed for Wheezing. (Patient not taking: Reported on 3/27/2025)      SITagliptin Phosphate (JANUVIA) 50 MG Oral Tab Take 1 tablet (50 mg total) by mouth daily. (Patient not taking: Reported on 3/27/2025) 90 tablet 3    Budesonide-Formoterol Fumarate 80-4.5 MCG/ACT Inhalation Aerosol Inhale 2 puffs into the lungs 2 (two) times daily. (Patient not taking: Reported on 3/27/2025) 10.2 g 0      Past Medical History:    Allergic rhinitis, cause unspecified    Anxiety    Back pain    Belching    Bleeding nose    Blood disorder    ITP    Blood in urine    Constipation    Decorative tattoo    Diarrhea,  unspecified    Dysphagia    Easy bruising    Esophageal reflux    Essential hypertension, benign    Flatulence/gas pain/belching    High blood pressure    High cholesterol    History of blood transfusion    2017 last tranfusion    History of depression    Irregular bowel habits    MS (multiple sclerosis) (HCC)    diagnosed 30 years ago    Obesity, unspecified    Osteoarthrosis, unspecified whether generalized or localized, unspecified site    Other and unspecified hyperlipidemia    Pain in joint, lower leg    Pain in joints    Postmenopausal atrophic vaginitis    Pure hypercholesterolemia    Seizure disorder (HCC)    last episode Sept 14, 2015    Stress    Unspecified asthma(493.90)    Unspecified essential hypertension    Unspecified hypothyroidism    Urge incontinence    Uterovaginal prolapse, incomplete    Visual impairment    glasses      Past Surgical History:   Procedure Laterality Date    Bowel resection      Colonoscopy  07/01/2016    Procedure: COLONOSCOPY WITH POLYPECTOMY/SUBMUCOSAL INJECTION/ENDOSCOPIC MUCOSAL RESECTION/BALLOON DILATION/BAND LIGATION;  Surgeon: Zina Arevalo DO;  Location:  ENDOSCOPY    Colonoscopy N/A 7/19/2023    Procedure: COLONOSCOPY with cold snare polypectomy;  Surgeon: Zina Arevalo DO;  Location:  ENDOSCOPY    Colonoscopy with biopsy  07/01/2016    Procedure: COLONOSCOPY WITH BIOPSY;  Surgeon: Zina Arevalo DO;  Location:  ENDOSCOPY    Egd  07/01/2016    Procedure: ESOPHAGOGASTRODUODENOSCOPY WITH BIOPSY;  Surgeon: Zina Arevalo DO;  Location:  ENDOSCOPY    Other surgical history  12/18/2021    laproscopic adhesion lysis      Social History:   Social History     Socioeconomic History    Marital status:     Number of children: 2   Tobacco Use    Smoking status: Never    Smokeless tobacco: Never    Tobacco comments:     Non-smoker   Vaping Use    Vaping status: Never Used   Substance and Sexual Activity    Alcohol use: Not Currently     Comment: Maybe a  couple times a year    Drug use: No   Other Topics Concern    Caffeine Concern No    Exercise No    Seat Belt Yes     Comment: 100%     Social Drivers of Health     Food Insecurity: No Food Insecurity (3/27/2025)    NCSS - Food Insecurity     Worried About Running Out of Food in the Last Year: No     Ran Out of Food in the Last Year: No   Transportation Needs: No Transportation Needs (3/27/2025)    NCSS - Transportation     Lack of Transportation: No   Housing Stability: Not At Risk (3/27/2025)    NCSS - Housing/Utilities     Has Housing: Yes     Worried About Losing Housing: No     Unable to Get Utilities: No     Exercise: none.  Diet:  no restrictions     REVIEW OF SYSTEMS:   GENERAL HEALTH: feels well otherwise  SKIN: denies any unusual skin lesions or rashes  RESPIRATORY: denies shortness of breath with exertion  CARDIOVASCULAR: denies chest pain on exertion  GI: denies abdominal pain and denies heartburn  NEURO: denies headaches    EXAM:   /80   Pulse 74   Temp 97.9 °F (36.6 °C) (Temporal)   Resp 18   LMP  (LMP Unknown)   SpO2 97%   GENERAL: well developed, well nourished,in no apparent distress  SKIN: no rashes,no suspicious lesions  NECK: supple,no adenopathy,no bruits  LUNGS: clear to auscultation  CARDIO: RRR without murmur  GI: good BS's,no masses, HSM or tenderness  EXTREMITIES: no cyanosis, clubbing or edema  Bilateral barefoot skin diabetic exam is normal, visualized feet and the appearance is normal.  Bilateral monofilament/sensation of both feet is normal.  Pulsation pedal pulse exam of both lower legs/feet is normal as well.  NEURO: sensation is intact to monofilament     ASSESSMENT AND PLAN:   Sarita Robertson is a 69 year old female who presents for a recheck of her diabetes. Diabetic control is unknown.  Recommendations are: continue present meds, check HgbA1C, check fasting lipids and CMP, lose wgt with carbohydrate controlled diet and exercise, refer to Ophthamology, check feet  daily.  The patient indicates understanding of these issues and agrees to the plan.  The patient is asked to return in 3 months.

## 2025-04-02 ENCOUNTER — TELEPHONE (OUTPATIENT)
Dept: FAMILY MEDICINE CLINIC | Facility: CLINIC | Age: 70
End: 2025-04-02

## 2025-04-07 DIAGNOSIS — E11.9 TYPE 2 DIABETES MELLITUS WITHOUT COMPLICATION, WITHOUT LONG-TERM CURRENT USE OF INSULIN (HCC): Primary | ICD-10-CM

## 2025-04-08 ENCOUNTER — TELEPHONE (OUTPATIENT)
Dept: FAMILY MEDICINE CLINIC | Facility: CLINIC | Age: 70
End: 2025-04-08

## 2025-04-08 RX ORDER — LANCETS 28 GAUGE
1 EACH MISCELLANEOUS 2 TIMES DAILY
Qty: 100 EACH | Refills: 0 | Status: SHIPPED | OUTPATIENT
Start: 2025-04-08 | End: 2026-04-08

## 2025-04-08 RX ORDER — BLOOD-GLUCOSE METER
1 KIT MISCELLANEOUS 2 TIMES DAILY
Qty: 1 EACH | Refills: 0 | Status: SHIPPED | OUTPATIENT
Start: 2025-04-08 | End: 2026-04-08

## 2025-04-08 RX ORDER — BLOOD-GLUCOSE METER
KIT MISCELLANEOUS
Qty: 100 STRIP | Refills: 0 | Status: SHIPPED | OUTPATIENT
Start: 2025-04-08 | End: 2026-04-08

## 2025-04-08 NOTE — TELEPHONE ENCOUNTER
Patient spoke with Yarely Boucher Ma yesterday and said that she has not been taking the Januvia due to the cost. She is going to go to diabetic education. She asked we have samples. Yarely advised patient we do have samples of Januvia 100mg. She will need to break the tablet in half. Samples placed up front. V.O. Dr Infante/Vamshi ACOSTA

## 2025-04-10 ENCOUNTER — TELEPHONE (OUTPATIENT)
Dept: FAMILY MEDICINE CLINIC | Facility: CLINIC | Age: 70
End: 2025-04-10

## 2025-04-10 NOTE — TELEPHONE ENCOUNTER
The Accucheck Smart View is no longer manufactured. Patient advised new script for Freestyle Lite meter, strips and lancets was sent to [facility] on 4/8/25.

## 2025-04-10 NOTE — TELEPHONE ENCOUNTER
Patient asked if Dr Infante can send a script in for Januvia 50mg #90 to [facility] and she will see what the cost will be.

## 2025-04-10 NOTE — TELEPHONE ENCOUNTER
Maddie at [facility] states the Freestyle Lite is not available to order. She will dispense another meter strips and lancets covered by patient's insurance.

## 2025-04-17 ENCOUNTER — TELEPHONE (OUTPATIENT)
Dept: FAMILY MEDICINE CLINIC | Facility: CLINIC | Age: 70
End: 2025-04-17

## 2025-04-17 DIAGNOSIS — F41.9 ANXIETY: ICD-10-CM

## 2025-04-17 DIAGNOSIS — I10 ESSENTIAL HYPERTENSION, BENIGN: ICD-10-CM

## 2025-04-17 RX ORDER — ALPRAZOLAM 0.25 MG
0.25 TABLET ORAL
Qty: 30 TABLET | Refills: 0 | Status: SHIPPED | OUTPATIENT
Start: 2025-04-17

## 2025-04-17 RX ORDER — METOPROLOL SUCCINATE 50 MG/1
TABLET, EXTENDED RELEASE ORAL
Qty: 30 TABLET | Refills: 1 | Status: SHIPPED | OUTPATIENT
Start: 2025-04-17 | End: 2025-04-17

## 2025-04-17 RX ORDER — MONTELUKAST SODIUM 10 MG/1
10 TABLET ORAL DAILY
Qty: 90 TABLET | Refills: 0 | Status: SHIPPED | OUTPATIENT
Start: 2025-04-17

## 2025-04-17 RX ORDER — METOPROLOL SUCCINATE 50 MG/1
50 TABLET, EXTENDED RELEASE ORAL DAILY
Qty: 90 TABLET | Refills: 0 | Status: SHIPPED | OUTPATIENT
Start: 2025-04-17

## 2025-04-17 NOTE — TELEPHONE ENCOUNTER
Sarita called and asked if she could have metoprolol 90 days instead of 30, Dr Infante approved. Discontinued last dose and prescribed new medication for 90 days 0 refills.

## 2025-04-17 NOTE — TELEPHONE ENCOUNTER
LOV:3-27-25    LAST LAB: 3-    LAST RX:  METOPROLOL SUCCINATE ER 50 MG Oral Tablet 24 Hr 30 tablet 1 1/23/2025 --   Sig:   TAKE 1 TABLET(50 MG) BY MOUTH       MONTELUKAST 10 MG Oral Tab 90 tablet 0 1/23/2025 --   Sig:   TAKE 1 TABLET(10 MG) BY MOUTH       ALPRAZOLAM 0.25 MG Oral Tab 30 tablet 0 3/19/2025 --   Sig:   TAKE 1 TABLET(0.25 MG) BY MOUTH EVERY NIGHT AS NEEDED         Next OV:   Future Appointments   Date Time Provider Department Center   8/13/2025  9:30 AM Rylee Infante MD [facility] [place]           PROTOCOL    Hypertension Medications Protocol [id number] 07:26 AM   Protocol Details CMP or BMP in past 12 months    Last BP reading less than 140/90    In person appointment or virtual visit in the past 12 mos or appointment in next 3 mos    EGFRCR or GFRNAA > 50    Medication is active on med list

## 2025-05-12 DIAGNOSIS — F41.9 ANXIETY: ICD-10-CM

## 2025-05-12 RX ORDER — ALPRAZOLAM 0.25 MG
0.25 TABLET ORAL NIGHTLY PRN
Qty: 90 TABLET | Refills: 3 | Status: SHIPPED | OUTPATIENT
Start: 2025-05-12 | End: 2026-05-12

## 2025-05-12 NOTE — TELEPHONE ENCOUNTER
Last refill: 04/17/25  Qty 30  W/ 0 refills  Last ov: 03/27/25    Requested Prescriptions     Pending Prescriptions Disp Refills    ALPRAZOLAM 0.25 MG Oral Tab [Pharmacy Med Name: ALPRAZOLAM 0.25MG TABLETS] 30 tablet 0     Sig: TAKE 1 TABLET(0.25 MG) BY MOUTH EVERY NIGHT AS NEEDED     Future Appointments   Date Time Provider Department Center   8/13/2025  9:30 AM Rylee Infante MD [facility] [place]

## 2025-06-03 RX ORDER — BLOOD SUGAR DIAGNOSTIC
STRIP MISCELLANEOUS 2 TIMES DAILY
Qty: 100 STRIP | Refills: 0 | Status: SHIPPED | OUTPATIENT
Start: 2025-06-03

## 2025-06-03 RX ORDER — LANCETS
1 EACH MISCELLANEOUS 2 TIMES DAILY
Qty: 100 EACH | Refills: 0 | Status: SHIPPED | OUTPATIENT
Start: 2025-06-03

## 2025-06-09 ENCOUNTER — TELEPHONE (OUTPATIENT)
Dept: FAMILY MEDICINE CLINIC | Facility: CLINIC | Age: 70
End: 2025-06-09

## 2025-06-12 ENCOUNTER — TELEPHONE (OUTPATIENT)
Dept: FAMILY MEDICINE CLINIC | Facility: CLINIC | Age: 70
End: 2025-06-12

## 2025-06-12 RX ORDER — BLOOD SUGAR DIAGNOSTIC
50 STRIP MISCELLANEOUS
Qty: 100 STRIP | Refills: 0 | Status: SHIPPED | OUTPATIENT
Start: 2025-06-12 | End: 2025-09-20

## 2025-06-12 RX ORDER — LANCETS
1 EACH MISCELLANEOUS DAILY
Qty: 100 EACH | Refills: 0 | Status: SHIPPED | OUTPATIENT
Start: 2025-06-12 | End: 2025-09-20

## 2025-06-12 NOTE — TELEPHONE ENCOUNTER
Medicare will only pay for once a day testing if not insulin dependant. [phone] - [facility]

## 2025-06-22 DIAGNOSIS — I10 ESSENTIAL HYPERTENSION, BENIGN: ICD-10-CM

## 2025-06-23 ENCOUNTER — LABORATORY ENCOUNTER (OUTPATIENT)
Dept: LAB | Age: 70
End: 2025-06-23
Attending: INTERNAL MEDICINE
Payer: MEDICARE

## 2025-06-23 ENCOUNTER — OFFICE VISIT (OUTPATIENT)
Dept: FAMILY MEDICINE CLINIC | Facility: CLINIC | Age: 70
End: 2025-06-23
Payer: MEDICARE

## 2025-06-23 VITALS
DIASTOLIC BLOOD PRESSURE: 78 MMHG | HEART RATE: 62 BPM | RESPIRATION RATE: 18 BRPM | SYSTOLIC BLOOD PRESSURE: 132 MMHG | OXYGEN SATURATION: 98 % | TEMPERATURE: 98 F

## 2025-06-23 DIAGNOSIS — E11.9 TYPE 2 DIABETES MELLITUS WITHOUT COMPLICATION, WITHOUT LONG-TERM CURRENT USE OF INSULIN (HCC): ICD-10-CM

## 2025-06-23 DIAGNOSIS — Z12.31 ENCOUNTER FOR SCREENING MAMMOGRAM FOR BREAST CANCER: Primary | ICD-10-CM

## 2025-06-23 DIAGNOSIS — E07.9 THYROID DYSFUNCTION: ICD-10-CM

## 2025-06-23 DIAGNOSIS — I10 ESSENTIAL HYPERTENSION, BENIGN: ICD-10-CM

## 2025-06-23 LAB
EST. AVERAGE GLUCOSE BLD GHB EST-MCNC: 120 MG/DL (ref 68–126)
HBA1C MFR BLD: 5.8 % (ref ?–5.7)
THYROPEROXIDASE AB SERPL-ACNC: 334 U/ML (ref ?–60)

## 2025-06-23 PROCEDURE — 86376 MICROSOMAL ANTIBODY EACH: CPT

## 2025-06-23 PROCEDURE — 99214 OFFICE O/P EST MOD 30 MIN: CPT | Performed by: INTERNAL MEDICINE

## 2025-06-23 PROCEDURE — G2211 COMPLEX E/M VISIT ADD ON: HCPCS | Performed by: INTERNAL MEDICINE

## 2025-06-23 PROCEDURE — 83036 HEMOGLOBIN GLYCOSYLATED A1C: CPT

## 2025-06-23 PROCEDURE — 36415 COLL VENOUS BLD VENIPUNCTURE: CPT

## 2025-06-23 RX ORDER — AMLODIPINE BESYLATE 10 MG/1
10 TABLET ORAL DAILY
Qty: 90 TABLET | Refills: 0 | Status: SHIPPED | OUTPATIENT
Start: 2025-06-23

## 2025-06-23 NOTE — PROGRESS NOTES
HPI:   Sarita Robertson is a 69 year old female who presents for recheck of her diabetes. Patient’s FBS have been better. Last visit with ophthalmologist was last year.  Pt has been checking her feet on a regular basis. Pt denies any tingling of the feet. Pt denies any issues with depression. Pt has been very careful about her diet and eliminated a lot of the sugars     Wt Readings from Last 6 Encounters:   07/26/23 230 lb 2 oz (104.4 kg)   07/19/23 231 lb (104.8 kg)   03/20/23 224 lb 8 oz (101.8 kg)   03/01/23 222 lb (100.7 kg)   05/24/22 232 lb 2 oz (105.3 kg)   12/27/21 213 lb 4 oz (96.7 kg)     There is no height or weight on file to calculate BMI.     Lab Results   Component Value Date    A1C 7.7 (H) 03/27/2025    A1C 6.4 (H) 12/18/2024    A1C 6.5 (H) 05/21/2024     Lab Results   Component Value Date    CHOLEST 204 (H) 03/27/2025    CHOLEST 173 12/18/2024    CHOLEST 207 (H) 05/21/2024     Lab Results   Component Value Date    HDL 44 03/27/2025    HDL 42 12/18/2024    HDL 36 (L) 05/21/2024     Lab Results   Component Value Date     (H) 03/27/2025    LDL 95 12/18/2024     (H) 05/21/2024     Lab Results   Component Value Date    TRIG 248 (H) 03/27/2025    TRIG 209 (H) 12/18/2024    TRIG 387 (H) 05/21/2024     Lab Results   Component Value Date    AST 19 03/27/2025    AST 17 12/18/2024    AST 17 05/21/2024     Lab Results   Component Value Date    ALT 23 03/27/2025    ALT 16 12/18/2024    ALT 22 05/21/2024     Malb/Cre Calc   Date Value Ref Range Status   05/21/2024 7.0 <=30.0 ug/mg Final     Comment:     <30 ug/mg creatinine       Normal     ug/mg creatinine   Microalbuminuria   >300 ug/mg creatinine      Albuminuria       08/04/2020 9.3 <=30.0 ug/mg Final     Comment:     <30 ug/mg creatinine       Normal     ug/mg creatinine   Microalbuminuria   >300 ug/mg creatinine      Albuminuria           Current Medications[1]   Past Medical History[2]   Past Surgical History[3]   Social History:  Short Social Hx on File[4]  Exercise: three times per week.  Diet: doesn't watch     REVIEW OF SYSTEMS:   GENERAL HEALTH: feels well otherwise  SKIN: denies any unusual skin lesions or rashes  RESPIRATORY: denies shortness of breath with exertion  CARDIOVASCULAR: denies chest pain on exertion  GI: denies abdominal pain and denies heartburn  NEURO: denies headaches    EXAM:   /78   Pulse 62   Temp 97.9 °F (36.6 °C) (Temporal)   Resp 18   LMP  (LMP Unknown)   SpO2 98%   GENERAL: well developed, well nourished,in no apparent distress  SKIN: no rashes,no suspicious lesions  NECK: supple,no adenopathy,no bruits  LUNGS: clear to auscultation  CARDIO: RRR without murmur  GI: good BS's,no masses, HSM or tenderness  EXTREMITIES: no cyanosis, clubbing or edema  Bilateral barefoot skin diabetic exam is normal, visualized feet and the appearance is normal.  Bilateral monofilament/sensation of both feet is normal.  Pulsation pedal pulse exam of both lower legs/feet is normal as well.  NEURO: sensation is intact to monofilament     ASSESSMENT AND PLAN:   Sarita Robertson is a 69 year old female who presents for a recheck of her diabetes. Diabetic control is better.    Recommendations are: continue diet control, check HgbA1C in 3 months, lose wgt with carbohydrate controlled diet and exercise, refer to Ophthamology, check feet daily.  The patient indicates understanding of these issues and agrees to the plan.  The patient is asked to return in 3 months.         [1]   Current Outpatient Medications   Medication Sig Dispense Refill    AMLODIPINE 10 MG Oral Tab TAKE 1 TABLET(10 MG) BY MOUTH DAILY 90 tablet 0    Glucose Blood (ACCU-CHEK GUIDE TEST) In Vitro Strip 50 each by Other route in the morning. 100 strip 0    ALPRAZolam 0.25 MG Oral Tab Take 1 tablet (0.25 mg total) by mouth nightly as needed. 90 tablet 3    MONTELUKAST 10 MG Oral Tab TAKE 1 TABLET(10 MG) BY MOUTH DAILY 90 tablet 0    metoprolol succinate ER 50 MG  Oral Tablet 24 Hr Take 1 tablet (50 mg total) by mouth daily. 90 tablet 0    SITagliptin Phosphate (JANUVIA) 50 MG Oral Tab Take 1 tablet (50 mg total) by mouth daily. 90 tablet 0    Blood Glucose Monitoring Suppl (FREESTYLE LITE) Does not apply Device 1 Device by Other route 2 (two) times daily. Dx E11.9 1 each 0    tranexamic acid 650 MG Oral Tab Two tab prior to procedures then 1 to 2 tab TID prn bleeding  Indications: Platelet storage pool disorder      LISINOPRIL 40 MG Oral Tab TAKE 1 TABLET(40 MG) BY MOUTH DAILY 90 tablet 1    clonazePAM 0.5 MG Oral Tab Take 1 tablet (0.5 mg total) by mouth 2 (two) times daily as needed (for seizure, then call 991). 10 tablet 0    LEVETIRACETAM 750 MG Oral Tab TAKE 1 TABLET(750 MG) BY MOUTH TWICE DAILY 180 tablet 3    Meloxicam 15 MG Oral Tab Take 1 tablet (15 mg total) by mouth daily. 90 tablet 3    Fluticasone Propionate  MCG/ACT Inhalation Aerosol Inhale 2 puffs into the lungs Q12H.      MAGNESIUM OR Take by mouth.      acetaminophen 325 MG Oral Tab Take 2 tablets (650 mg total) by mouth every 6 (six) hours as needed.      saccharomyces boulardii (FLORASTOR) 250 MG Oral Cap Take 1 capsule (250 mg total) by mouth 2 (two) times daily. 60 capsule 11    Accu-Chek Softclix Lancets Does not apply Misc 1 Lancet by Finger stick route daily. 100 each 0    SITagliptin Phosphate (JANUVIA) 100 MG Oral Tab Take 0.5 tablets (50 mg total) by mouth daily. 28 tablet 0    VENTOLIN  (90 Base) MCG/ACT Inhalation Aero Soln Inhale 2 puffs into the lungs every 4 (four) hours as needed for Wheezing. (Patient not taking: Reported on 3/27/2025)      SITagliptin Phosphate (JANUVIA) 50 MG Oral Tab Take 1 tablet (50 mg total) by mouth daily. (Patient not taking: Reported on 3/27/2025) 90 tablet 3    Budesonide-Formoterol Fumarate 80-4.5 MCG/ACT Inhalation Aerosol Inhale 2 puffs into the lungs 2 (two) times daily. (Patient not taking: Reported on 3/27/2025) 10.2 g 0   [2]   Past Medical  History:   Allergic rhinitis, cause unspecified    Anxiety    Back pain    Belching    Bleeding nose    Blood disorder    ITP    Blood in urine    Constipation    Decorative tattoo    Diarrhea, unspecified    Dysphagia    Easy bruising    Esophageal reflux    Essential hypertension, benign    Flatulence/gas pain/belching    High blood pressure    High cholesterol    History of blood transfusion    2017 last tranfusion    History of depression    Irregular bowel habits    MS (multiple sclerosis) (HCC)    diagnosed 30 years ago    Obesity, unspecified    Osteoarthrosis, unspecified whether generalized or localized, unspecified site    Other and unspecified hyperlipidemia    Pain in joint, lower leg    Pain in joints    Postmenopausal atrophic vaginitis    Pure hypercholesterolemia    Seizure disorder (HCC)    last episode Sept 14, 2015    Stress    Unspecified asthma(493.90)    Unspecified essential hypertension    Unspecified hypothyroidism    Urge incontinence    Uterovaginal prolapse, incomplete    Visual impairment    glasses   [3]   Past Surgical History:  Procedure Laterality Date    Bowel resection      Colonoscopy  07/01/2016    Procedure: COLONOSCOPY WITH POLYPECTOMY/SUBMUCOSAL INJECTION/ENDOSCOPIC MUCOSAL RESECTION/BALLOON DILATION/BAND LIGATION;  Surgeon: Zina Arevalo DO;  Location:  ENDOSCOPY    Colonoscopy N/A 7/19/2023    Procedure: COLONOSCOPY with cold snare polypectomy;  Surgeon: Zina Arevalo DO;  Location:  ENDOSCOPY    Colonoscopy with biopsy  07/01/2016    Procedure: COLONOSCOPY WITH BIOPSY;  Surgeon: Zina Arevalo DO;  Location:  ENDOSCOPY    Egd  07/01/2016    Procedure: ESOPHAGOGASTRODUODENOSCOPY WITH BIOPSY;  Surgeon: Zina Arevalo DO;  Location:  ENDOSCOPY    Other surgical history  12/18/2021    laproscopic adhesion lysis   [4]   Social History  Socioeconomic History    Marital status:     Number of children: 2   Tobacco Use    Smoking status: Never     Smokeless tobacco: Never    Tobacco comments:     Non-smoker   Vaping Use    Vaping status: Never Used   Substance and Sexual Activity    Alcohol use: Not Currently     Comment: Maybe a couple times a year    Drug use: No   Other Topics Concern    Caffeine Concern No    Exercise No    Seat Belt Yes     Comment: 100%     Social Drivers of Health     Food Insecurity: No Food Insecurity (3/27/2025)    NCSS - Food Insecurity     Worried About Running Out of Food in the Last Year: No     Ran Out of Food in the Last Year: No   Transportation Needs: No Transportation Needs (3/27/2025)    NCSS - Transportation     Lack of Transportation: No   Housing Stability: Not At Risk (3/27/2025)    NCSS - Housing/Utilities     Has Housing: Yes     Worried About Losing Housing: No     Unable to Get Utilities: No

## 2025-06-23 NOTE — TELEPHONE ENCOUNTER
Last refill: 03/10/25  Qty 90  W 0 refills  Last ov: 03/27/25    Requested Prescriptions     Pending Prescriptions Disp Refills    AMLODIPINE 10 MG Oral Tab [Pharmacy Med Name: AMLODIPINE BESYLATE 10MGTABLETS] 90 tablet 0     Sig: TAKE 1 TABLET(10 MG) BY MOUTH DAILY     Future Appointments   Date Time Provider Department Center   6/23/2025  9:45 AM Rylee Infante MD [facility] [place]   8/13/2025  9:30 AM Rylee Infante MD [facility] [place]

## 2025-07-09 ENCOUNTER — OFFICE VISIT (OUTPATIENT)
Dept: FAMILY MEDICINE CLINIC | Facility: CLINIC | Age: 70
End: 2025-07-09
Payer: MEDICARE

## 2025-07-09 VITALS
OXYGEN SATURATION: 97 % | HEART RATE: 63 BPM | SYSTOLIC BLOOD PRESSURE: 123 MMHG | TEMPERATURE: 98 F | RESPIRATION RATE: 18 BRPM | DIASTOLIC BLOOD PRESSURE: 68 MMHG

## 2025-07-09 DIAGNOSIS — R21 RASH AND NONSPECIFIC SKIN ERUPTION: Primary | ICD-10-CM

## 2025-07-09 PROCEDURE — 99213 OFFICE O/P EST LOW 20 MIN: CPT

## 2025-07-09 RX ORDER — CLOTRIMAZOLE 1 %
1 CREAM (GRAM) TOPICAL 2 TIMES DAILY
Qty: 14 G | Refills: 1 | Status: SHIPPED | OUTPATIENT
Start: 2025-07-09 | End: 2025-07-09 | Stop reason: CLARIF

## 2025-07-09 RX ORDER — MELOXICAM 15 MG/1
15 TABLET ORAL DAILY
COMMUNITY
Start: 2025-05-13

## 2025-07-09 RX ORDER — KETOCONAZOLE 20 MG/G
1 CREAM TOPICAL 2 TIMES DAILY
Qty: 15 G | Refills: 2 | Status: SHIPPED | OUTPATIENT
Start: 2025-07-09 | End: 2025-07-23

## 2025-07-09 NOTE — PROGRESS NOTES
HPI:   Sarita is a 69 yr. Old female here today for a belly button rash.  Patient reports the rash has been present for several days.  Reports itchiness, denies pain.          Current Medications[1]   Past Medical History[2]   Past Surgical History[3]   Family History[4]   Short Social Hx on File[5]      REVIEW OF SYSTEMS:   Review of Systems   Constitutional:  Negative for chills and fever.   Gastrointestinal:  Negative for abdominal distention and abdominal pain.   Genitourinary: Negative.    Skin:  Positive for rash (see hpi).       EXAM:   /68 (BP Location: Left arm, Patient Position: Sitting)   Pulse 63   Temp 97.9 °F (36.6 °C) (Temporal)   Resp 18   LMP  (LMP Unknown)   SpO2 97%   Physical Exam  Vitals reviewed.   Constitutional:       General: She is not in acute distress.     Appearance: Normal appearance.   HENT:      Head: Atraumatic.   Cardiovascular:      Rate and Rhythm: Normal rate.   Pulmonary:      Effort: Pulmonary effort is normal.   Abdominal:      Comments: Umbilicus erythematous and edematous with satellite erythema.   Skin:     General: Skin is warm and dry.   Neurological:      Mental Status: She is alert and oriented to person, place, and time.         ASSESSMENT AND PLAN:   Diagnoses and all orders for this visit:    Rash and nonspecific skin eruption  -     Discontinue: clotrimazole 1 % External Cream; Apply 1 Application topically 2 (two) times daily for 14 days.  -     ketoconazole 2 % External Cream; Apply 1 Application topically 2 (two) times daily for 14 days.     -Suspect yeast/candidal infection.  Recommend topical antifungal twice daily for 2 weeks. Discussed prevention.  Printed patient instructions were provided.  -Return for persistent or worsening symptoms, call with questions.  -Patient verbalized understanding and in agreement with plan.    ANALY Padilla       [1]   Current Outpatient Medications   Medication Sig Dispense Refill    Meloxicam 15 MG Oral Tab  Take 1 tablet (15 mg total) by mouth daily.      ketoconazole 2 % External Cream Apply 1 Application topically 2 (two) times daily for 14 days. 15 g 2    lisinopril 40 MG Oral Tab Take 1 tablet (40 mg total) by mouth daily. 90 tablet 1    metoprolol succinate ER 50 MG Oral Tablet 24 Hr Take 1 tablet (50 mg total) by mouth daily. 90 tablet 1    Glucose Blood (ACCU-CHEK GUIDE TEST) In Vitro Strip 50 each by Other route in the morning. 100 strip 0    MONTELUKAST 10 MG Oral Tab TAKE 1 TABLET(10 MG) BY MOUTH DAILY 90 tablet 0    VENTOLIN  (90 Base) MCG/ACT Inhalation Aero Soln Inhale 2 puffs into the lungs every 4 (four) hours as needed for Wheezing.      clonazePAM 0.5 MG Oral Tab Take 1 tablet (0.5 mg total) by mouth 2 (two) times daily as needed (for seizure, then call [phone]). 10 tablet 0    LEVETIRACETAM 750 MG Oral Tab TAKE 1 TABLET(750 MG) BY MOUTH TWICE DAILY 180 tablet 3    MAGNESIUM OR Take by mouth.      saccharomyces boulardii (FLORASTOR) 250 MG Oral Cap Take 1 capsule (250 mg total) by mouth 2 (two) times daily. 60 capsule 11    amLODIPine 10 MG Oral Tab Take 1 tablet (10 mg total) by mouth daily. 90 tablet 1    Accu-Chek Softclix Lancets Does not apply Misc 1 Lancet by Finger stick route daily. 100 each 0    ALPRAZolam 0.25 MG Oral Tab Take 1 tablet (0.25 mg total) by mouth nightly as needed. 90 tablet 3    SITagliptin Phosphate (JANUVIA) 50 MG Oral Tab Take 1 tablet (50 mg total) by mouth daily. (Patient not taking: Reported on 7/9/2025) 90 tablet 0    SITagliptin Phosphate (JANUVIA) 100 MG Oral Tab Take 0.5 tablets (50 mg total) by mouth daily. 28 tablet 0    Blood Glucose Monitoring Suppl (FREESTYLE LITE) Does not apply Device 1 Device by Other route 2 (two) times daily. Dx E11.9 1 each 0    tranexamic acid 650 MG Oral Tab Two tab prior to procedures then 1 to 2 tab TID prn bleeding  Indications: Platelet storage pool disorder      Fluticasone Propionate  MCG/ACT Inhalation Aerosol Inhale 2  puffs into the lungs Q12H.      Budesonide-Formoterol Fumarate 80-4.5 MCG/ACT Inhalation Aerosol Inhale 2 puffs into the lungs 2 (two) times daily. (Patient not taking: Reported on 3/27/2025) 10.2 g 0    acetaminophen 325 MG Oral Tab Take 2 tablets (650 mg total) by mouth every 6 (six) hours as needed.     [2]   Past Medical History:   Allergic rhinitis, cause unspecified    Anxiety    Back pain    Belching    Bleeding nose    Blood disorder    ITP    Blood in urine    Constipation    Decorative tattoo    Diarrhea, unspecified    Dysphagia    Easy bruising    Esophageal reflux    Essential hypertension, benign    Flatulence/gas pain/belching    High blood pressure    High cholesterol    History of blood transfusion    2017 last tranfusion    History of depression    Irregular bowel habits    MS (multiple sclerosis) (HCC)    diagnosed 30 years ago    Obesity, unspecified    Osteoarthrosis, unspecified whether generalized or localized, unspecified site    Other and unspecified hyperlipidemia    Pain in joint, lower leg    Pain in joints    Postmenopausal atrophic vaginitis    Pure hypercholesterolemia    Seizure disorder (HCC)    last episode Sept 14, 2015    Stress    Unspecified asthma(493.90)    Unspecified essential hypertension    Unspecified hypothyroidism    Urge incontinence    Uterovaginal prolapse, incomplete    Visual impairment    glasses   [3]   Past Surgical History:  Procedure Laterality Date    Bowel resection      Colonoscopy  07/01/2016    Procedure: COLONOSCOPY WITH POLYPECTOMY/SUBMUCOSAL INJECTION/ENDOSCOPIC MUCOSAL RESECTION/BALLOON DILATION/BAND LIGATION;  Surgeon: Zina Arevalo DO;  Location:  ENDOSCOPY    Colonoscopy N/A 7/19/2023    Procedure: COLONOSCOPY with cold snare polypectomy;  Surgeon: Zina Arevalo DO;  Location:  ENDOSCOPY    Colonoscopy with biopsy  07/01/2016    Procedure: COLONOSCOPY WITH BIOPSY;  Surgeon: Zina Arevalo DO;  Location:  ENDOSCOPY    Egd   07/01/2016    Procedure: ESOPHAGOGASTRODUODENOSCOPY WITH BIOPSY;  Surgeon: Zina Arevalo DO;  Location:  ENDOSCOPY    Other surgical history  12/18/2021    laproscopic adhesion lysis   [4]   Family History  Problem Relation Age of Onset    Heart Disorder Father     Other (Other) Father         PACEMAKER TACHYBRADY SYNDROME    Colon Cancer Father         Lung cancer    Arthritis Mother     Heart Disorder Mother     Other (Other) Mother         Hypothyroidism    Colon Cancer Mother         Colon cancer    Cancer Maternal Grandmother     Cancer Maternal Grandfather     Breast Cancer Maternal Aunt 65        est   [5]   Social History  Socioeconomic History    Marital status:     Number of children: 2   Tobacco Use    Smoking status: Never    Smokeless tobacco: Never    Tobacco comments:     Non-smoker   Vaping Use    Vaping status: Never Used   Substance and Sexual Activity    Alcohol use: Not Currently     Comment: Maybe a couple times a year    Drug use: No   Other Topics Concern    Caffeine Concern No    Exercise No    Seat Belt Yes     Comment: 100%     Social Drivers of Health     Food Insecurity: No Food Insecurity (3/27/2025)    NCSS - Food Insecurity     Worried About Running Out of Food in the Last Year: No     Ran Out of Food in the Last Year: No   Transportation Needs: No Transportation Needs (3/27/2025)    NCSS - Transportation     Lack of Transportation: No   Housing Stability: Not At Risk (3/27/2025)    NCSS - Housing/Utilities     Has Housing: Yes     Worried About Losing Housing: No     Unable to Get Utilities: No

## 2025-07-15 RX ORDER — MONTELUKAST SODIUM 10 MG/1
10 TABLET ORAL DAILY
Qty: 90 TABLET | Refills: 0 | Status: SHIPPED | OUTPATIENT
Start: 2025-07-15

## 2025-07-15 NOTE — TELEPHONE ENCOUNTER
Last refill: 04/17/25  Qty 90  W/ 0 refills  Last ov: 03/27/25    Requested Prescriptions     Pending Prescriptions Disp Refills    MONTELUKAST 10 MG Oral Tab [Pharmacy Med Name: MONTELUKAST 10MG TABLETS] 90 tablet 0     Sig: TAKE 1 TABLET(10 MG) BY MOUTH DAILY     Future Appointments   Date Time Provider Department Center   9/16/2025 10:00 AM Rylee Infante MD [facility] [place]

## 2025-08-10 DIAGNOSIS — I10 ESSENTIAL HYPERTENSION, BENIGN: ICD-10-CM

## 2025-08-11 RX ORDER — LISINOPRIL 40 MG/1
40 TABLET ORAL DAILY
Qty: 90 TABLET | Refills: 1 | OUTPATIENT
Start: 2025-08-11

## (undated) DIAGNOSIS — I10 ESSENTIAL HYPERTENSION, BENIGN: ICD-10-CM

## (undated) DEVICE — FLUIDGARD® 160 ANTI-FOG SURGICAL MASK WITH ANTI-GLARE SHIELD: Brand: PRECEPT ®

## (undated) DEVICE — ENDOSCOPY PACK - LOWER: Brand: MEDLINE INDUSTRIES, INC.

## (undated) DEVICE — 1200CC GUARDIAN II: Brand: GUARDIAN

## (undated) DEVICE — KIT ENDO ORCAPOD 160/180/190

## (undated) DEVICE — LASSO POLYPECTOMY SNARE: Brand: LASSO

## (undated) DEVICE — 10FT COMBINED O2 DELIVERY/CO2 MONITORING. FILTER WITH MICROSTREAM TYPE LUER: Brand: DUAL ADULT NASAL CANNULA

## (undated) DEVICE — 3M™ RED DOT™ MONITORING ELECTRODE WITH FOAM TAPE AND STICKY GEL, 50/BAG, 20/CASE, 72/PLT 2570: Brand: RED DOT™

## (undated) DEVICE — TRAP SPEC REMOVAL ETRAP 15CM

## (undated) NOTE — MR AVS SNAPSHOT
[facility]  [address] [address]  [phone]               Thank you for choosing us for your health care visit with Rachel Ellis MD.  We are glad to serve you and happy to provide you with this summary o alprazolam 0.25 MG Tabs   Take 1 tablet (0.25 mg total) by mouth 3 (three) times daily as needed for Sleep. Commonly known as:  XANAX           ClonazePAM 0.5 MG Tabs   Take 0.5 mg by mouth as needed for Anxiety.  For grand mal seizures   Commonly known Call [phone] for help. "[employer]hart is NOT to be used for urgent needs. For medical emergencies, dial 911.         Educational Information     Healthy Diet and Regular Exercise  [employer] for making healthy food choices  -

## (undated) NOTE — LETTER
12/23/19        Sarita DARLING [address]      Dear Nish Brantley records indicate that you have outstanding lab work and or testing that was ordered for you and has not yet been completed:  Orders Placed This Encounter    [place]

## (undated) NOTE — LETTER
08/01/20  MRI examination of the brain with and without contrast, neuro-oncology protocol    History: Follow up to leptomeningeal enhancement.     Comparison study:7/16/2018    Technique: Multiplanar spin-echo,SWI, diffusion weighted,FLAIR and T1 sequences Findings: Cortical and sulcal FLAIR signal abnormality is seen along with leptomeningeal enhancement involving the right postcentral gyrus and superior parietal lobule which if anything is slightly less prominent on the current study when compared to the p

## (undated) NOTE — LETTER
06/16/21        Sarita DARLING [address]      Dear Sharlene Hernández records indicate that you have outstanding lab work and or testing that was ordered for you and has not yet been completed:  Orders Placed This Encounter      Hem

## (undated) NOTE — LETTER
[address] [address] [phone]        2019    To whom it may concern,    Please allow passenger Mirna Weeks  [birth year] to carry Desmopressin na